# Patient Record
Sex: FEMALE | Race: WHITE | Employment: PART TIME | URBAN - METROPOLITAN AREA
[De-identification: names, ages, dates, MRNs, and addresses within clinical notes are randomized per-mention and may not be internally consistent; named-entity substitution may affect disease eponyms.]

---

## 2021-06-07 ENCOUNTER — TELEPHONE (OUTPATIENT)
Dept: FAMILY MEDICINE CLINIC | Age: 61
End: 2021-06-07

## 2021-06-07 ENCOUNTER — OFFICE VISIT (OUTPATIENT)
Dept: FAMILY MEDICINE CLINIC | Age: 61
End: 2021-06-07
Payer: COMMERCIAL

## 2021-06-07 VITALS
WEIGHT: 235 LBS | DIASTOLIC BLOOD PRESSURE: 80 MMHG | BODY MASS INDEX: 39.15 KG/M2 | SYSTOLIC BLOOD PRESSURE: 128 MMHG | OXYGEN SATURATION: 98 % | HEIGHT: 65 IN | RESPIRATION RATE: 18 BRPM | HEART RATE: 99 BPM

## 2021-06-07 DIAGNOSIS — Z12.31 ENCOUNTER FOR SCREENING MAMMOGRAM FOR BREAST CANCER: ICD-10-CM

## 2021-06-07 DIAGNOSIS — E11.9 TYPE 2 DIABETES MELLITUS WITHOUT COMPLICATION, WITHOUT LONG-TERM CURRENT USE OF INSULIN (HCC): ICD-10-CM

## 2021-06-07 DIAGNOSIS — R19.7 DIARRHEA, UNSPECIFIED TYPE: ICD-10-CM

## 2021-06-07 DIAGNOSIS — E03.9 HYPOTHYROIDISM, UNSPECIFIED TYPE: ICD-10-CM

## 2021-06-07 DIAGNOSIS — M25.561 ACUTE PAIN OF RIGHT KNEE: ICD-10-CM

## 2021-06-07 DIAGNOSIS — Z13.220 SCREENING FOR HYPERLIPIDEMIA: ICD-10-CM

## 2021-06-07 DIAGNOSIS — E11.9 TYPE 2 DIABETES MELLITUS WITHOUT COMPLICATION, WITHOUT LONG-TERM CURRENT USE OF INSULIN (HCC): Primary | ICD-10-CM

## 2021-06-07 LAB — HBA1C MFR BLD: 12.6 %

## 2021-06-07 PROCEDURE — 99204 OFFICE O/P NEW MOD 45 MIN: CPT | Performed by: NURSE PRACTITIONER

## 2021-06-07 PROCEDURE — 83036 HEMOGLOBIN GLYCOSYLATED A1C: CPT | Performed by: NURSE PRACTITIONER

## 2021-06-07 RX ORDER — LIRAGLUTIDE 6 MG/ML
1.2 INJECTION SUBCUTANEOUS DAILY
Qty: 2 PEN | Refills: 3 | Status: SHIPPED | OUTPATIENT
Start: 2021-06-07 | End: 2021-06-07 | Stop reason: SDUPTHER

## 2021-06-07 RX ORDER — LEVOTHYROXINE, LIOTHYRONINE 38; 9 UG/1; UG/1
60 TABLET ORAL DAILY
COMMUNITY
Start: 2021-03-15 | End: 2021-06-28 | Stop reason: SDUPTHER

## 2021-06-07 RX ORDER — CLOTRIMAZOLE 1 %
CREAM (GRAM) TOPICAL 2 TIMES DAILY
COMMUNITY
End: 2021-12-06 | Stop reason: SDUPTHER

## 2021-06-07 RX ORDER — LIRAGLUTIDE 6 MG/ML
1.2 INJECTION SUBCUTANEOUS DAILY
Qty: 2 PEN | Refills: 3 | Status: SHIPPED | OUTPATIENT
Start: 2021-06-07 | End: 2021-07-26 | Stop reason: SDUPTHER

## 2021-06-07 RX ORDER — SITAGLIPTIN AND METFORMIN HYDROCHLORIDE 1000; 50 MG/1; MG/1
1 TABLET, FILM COATED, EXTENDED RELEASE ORAL DAILY
Qty: 30 TABLET | Refills: 2 | Status: SHIPPED | OUTPATIENT
Start: 2021-06-07 | End: 2021-06-07 | Stop reason: SDUPTHER

## 2021-06-07 RX ORDER — SITAGLIPTIN AND METFORMIN HYDROCHLORIDE 1000; 50 MG/1; MG/1
1 TABLET, FILM COATED, EXTENDED RELEASE ORAL 2 TIMES DAILY
Qty: 60 TABLET | Refills: 2 | Status: SHIPPED | OUTPATIENT
Start: 2021-06-07 | End: 2021-09-05

## 2021-06-07 ASSESSMENT — ENCOUNTER SYMPTOMS
COUGH: 0
ABDOMINAL PAIN: 0
VOMITING: 0
SINUS PAIN: 0
NAUSEA: 0
SHORTNESS OF BREATH: 0
DIARRHEA: 1
SORE THROAT: 0

## 2021-06-07 ASSESSMENT — PATIENT HEALTH QUESTIONNAIRE - PHQ9
2. FEELING DOWN, DEPRESSED OR HOPELESS: 0
1. LITTLE INTEREST OR PLEASURE IN DOING THINGS: 0
SUM OF ALL RESPONSES TO PHQ QUESTIONS 1-9: 0
SUM OF ALL RESPONSES TO PHQ9 QUESTIONS 1 & 2: 0

## 2021-06-07 NOTE — PATIENT INSTRUCTIONS
Patient Education        Learning About Type 2 Diabetes  What is type 2 diabetes? Type 2 diabetes is a condition in which you have too much sugar (glucose) in your blood. Glucose is a type of sugar produced in your body when carbohydrates and other foods are digested. It provides energy to cells throughout the body. Normally, blood sugar levels increase after you eat a meal. When blood sugar rises, cells in the pancreas release insulin, which causes the body to absorb sugar from the blood and lowers the blood sugar level to normal.  When you have type 2 diabetes, sugar stays in the blood rather than entering the body's cells to be used for energy. This results in high blood sugar. It happens when your body can't use insulin the right way. Over time, high blood sugar can harm many parts of the body, such as your eyes, heart, blood vessels, nerves, and kidneys. It can also increase your risk for other health problems (complications). What can you expect with type 2 diabetes? Jad Clemons keep hearing about how important it is to keep your blood sugar within a target range. That's because over time, high blood sugar can lead to serious problems. It can:  · Harm your eyes, nerves, and kidneys. · Damage your blood vessels, leading to heart disease and stroke. · Reduce blood flow and cause nerve damage to parts of your body, especially your feet. This can cause slow healing and pain when you walk. · Make your immune system weak and less able to fight infections. When people hear the word \"diabetes,\" they often think of problems like these. But daily care and treatment can help prevent or delay these problems. The goal is to keep your blood sugar in a target range. That's the best way to reduce your chance of having more problems from diabetes. What are the symptoms? Some people who have type 2 diabetes may not have any symptoms early on.  Many people with the disease don't even know they have it at first. But with in to your [employer] account. Enter Y981 in the [employer] box to learn more about \"Learning About Type 2 Diabetes. \"     If you do not have an account, please click on the \"Sign Up Now\" link. Current as of: August 31, 2020               Content Version: 12.8  © 1323-6655 Healthwise, Incorporated. Care instructions adapted under license by [facility] ([facility]). If you have questions about a medical condition or this instruction, always ask your healthcare professional. [address] any warranty or liability for your use of this information.

## 2021-06-07 NOTE — ASSESSMENT & PLAN NOTE
Unclear control, continue current medications and continue current treatment plan continue meds, check tsh

## 2021-06-07 NOTE — PROGRESS NOTES
[address] [facility]  [address]  [address] [facility] [address]  Dept: [phone]  Dept Fax: [phone]    Hardy Sanchez is a 63 y.o. female who presents today for her medicalconditions/complaints as noted below. Hardy Sanchez is c/o of New Patient (was seeing Dr. Gordon St), Diabetes (last a1c was over a year ago. sugars average 250-275 in am. supposed to be taking victoza), Other (chiro wants her to have a pull up brace for her right knee. injured it a few months ago in a fall), Diarrhea (x 7-8 weeks), and Health Maintenance [facility]      HPI:         60-year-old female patient presents with complaints of encounter to establish care. Patient has significant history of diabetes. Has previously been prescribed Metformin, Victoza. Has only been taking Metformin 850 twice daily. Reports her fasting blood sugars have been ranging from 2 . History of hypothyroidism takes NP thyroid 60 mg daily unsure of last TSH. Patient did have a fall in January. Reportedly injured her back and her knees. Reportedly fell and landed on the right knee. Was seen at the emergency department had imaging of her low back and hip reportedly normal and has been following with a chiropractor. Reports the knee pain has persisted. Has not had any imaging on this area. Patient reports diarrhea for approximately 7 to 8 weeks. Reports constant daily approximately 7-10 times daily. Denies any blood in the stool. Denies any abdominal pain, nausea or vomiting. Last colonoscopy was normal 7 to 8 years ago. Past Medical History:   Diagnosis Date    Diabetes mellitus (HCC)     Hypothyroidism         Current Outpatient Medications   Medication Sig Dispense Refill    NP THYROID 60 MG tablet Take 60 mg by mouth daily      clotrimazole (LOTRIMIN) 1 % cream Apply topically 2 times daily Apply topically 2 times daily.       SITagliptin-metFORMIN (JANUMET XR)  MG TB24 per extended release tablet Take 1 tablet by mouth daily 30 tablet 2    Liraglutide (VICTOZA) 18 MG/3ML SOPN SC injection Inject 1.2 mg into the skin daily 2 pen 3    Elastic Bandages & Supports (NEOPRENE KNEE BRACE) MISC Knee sleeve  Right knee 1 each 0     No current facility-administered medications for this visit. Allergies   Allergen Reactions    Sulfa Antibiotics Hives       Subjective:      Review of Systems   Constitutional: Negative for chills and fever. HENT: Negative for ear pain, sinus pain and sore throat. Respiratory: Negative for cough and shortness of breath. Cardiovascular: Negative for chest pain and palpitations. Gastrointestinal: Positive for diarrhea. Negative for abdominal pain, nausea and vomiting. Musculoskeletal: Positive for arthralgias (rt knee). Neurological: Negative for dizziness and headaches. All other systems reviewed and are negative.      :Objective     Physical Exam  Vitals and nursing note reviewed. Constitutional:       General: She is not in acute distress. Appearance: Normal appearance. She is not toxic-appearing. HENT:      Mouth/Throat:      Mouth: Mucous membranes are moist.   Cardiovascular:      Rate and Rhythm: Normal rate. Pulmonary:      Effort: Pulmonary effort is normal. No respiratory distress. Breath sounds: Normal breath sounds. Abdominal:      Palpations: Abdomen is soft. Tenderness: There is no abdominal tenderness. Musculoskeletal:      Lumbar back: Tenderness present. No bony tenderness. Right knee: Swelling and bony tenderness present. No LCL laxity, MCL laxity, ACL laxity or PCL laxity. Normal alignment and normal meniscus. Skin:     General: Skin is warm and dry. Neurological:      General: No focal deficit present. Mental Status: She is alert and oriented to person, place, and time.        /80   Pulse 99   Resp 18   Ht 5' 5\" (1.651 m) Wt 235 lb (106.6 kg)   SpO2 98%   BMI 39.11 kg/m²     Lab Review   No results found for any previous visit. Assessment and Plan      1. Type 2 diabetes mellitus without complication, without long-term current use of insulin ([facility])  Assessment & Plan:   Uncontrolled, changes made today: janumet and victoza   Monitor fasting glucose recheck 1 month  Orders:  -     POCT glycosylated hemoglobin (Hb A1C)  -     SITagliptin-metFORMIN (JANUMET XR)  MG TB24 per extended release tablet; Take 1 tablet by mouth daily, Disp-30 tablet, R-2Normal  -     Liraglutide (VICTOZA) 18 MG/3ML SOPN SC injection; Inject 1.2 mg into the skin daily, Disp-2 pen, R-3Normal  2. Encounter for screening mammogram for breast cancer  -     [facility] Digital Screen Bilateral [[id number]]; Future  3. Screening for hyperlipidemia  -     Lipid, Fasting; Future  -     Lipid Panel; Future  4. Diarrhea, unspecified type  Assessment & Plan:  Check labs,   Orders:  -     Gastrointestinal Panel, Molecular; Future  -     H. Pylori Antigen, Stool; Future  -     CBC With Auto Differential; Future  -     Comprehensive Metabolic Panel; Future  5. Hypothyroidism, unspecified type  Assessment & Plan:   Unclear control, continue current medications and continue current treatment plan continue meds, check tsh  Orders:  -     TSH With Reflex Ft4; Future  6. Acute pain of right knee  Assessment & Plan:   Xray right knee  Knee sleeve  Orders:  -     XR KNEE RIGHT (3 VIEWS); Future  -     Elastic Bandages & Supports (NEOPRENE KNEE BRACE) MISC; Disp-1 each, R-0, PrintKnee sleeve Right knee                 Results for orders placed or performed in visit on 06/07/21   POCT glycosylated hemoglobin (Hb A1C)   Result Value Ref Range    Hemoglobin A1C 12.6 (A) %             Return in about 4 weeks (around 7/5/2021), or if symptoms worsen or fail to improve.         Orders Placed This Encounter   Medications    SITagliptin-metFORMIN (JANUMET XR)  MG TB24 per extended

## 2021-06-21 LAB
ALBUMIN SERPL-MCNC: 4.1 G/DL
ALP BLD-CCNC: 50 U/L
ALT SERPL-CCNC: 22 U/L
ANION GAP SERPL CALCULATED.3IONS-SCNC: 10 MMOL/L
AST SERPL-CCNC: 15 U/L
BASOPHILS ABSOLUTE: NORMAL
BASOPHILS RELATIVE PERCENT: NORMAL
BILIRUB SERPL-MCNC: 0.4 MG/DL (ref 0.1–1.4)
BUN BLDV-MCNC: 11 MG/DL
CALCIUM SERPL-MCNC: 9.4 MG/DL
CHLORIDE BLD-SCNC: 101 MMOL/L
CHOLESTEROL, FASTING: 191
CO2: 28 MMOL/L
CREAT SERPL-MCNC: 0.61 MG/DL
EOSINOPHILS ABSOLUTE: NORMAL
EOSINOPHILS RELATIVE PERCENT: NORMAL
GFR CALCULATED: ABNORMAL
GLUCOSE BLD-MCNC: 260 MG/DL
HCT VFR BLD CALC: NORMAL %
HDLC SERPL-MCNC: 39 MG/DL (ref 35–70)
HEMOGLOBIN: NORMAL
LDL CHOLESTEROL CALCULATED: 87 MG/DL (ref 0–160)
LYMPHOCYTES ABSOLUTE: NORMAL
LYMPHOCYTES RELATIVE PERCENT: NORMAL
MCH RBC QN AUTO: NORMAL PG
MCHC RBC AUTO-ENTMCNC: NORMAL G/DL
MCV RBC AUTO: NORMAL FL
MONOCYTES ABSOLUTE: NORMAL
MONOCYTES RELATIVE PERCENT: NORMAL
NEUTROPHILS ABSOLUTE: NORMAL
NEUTROPHILS RELATIVE PERCENT: NORMAL
PDW BLD-RTO: NORMAL %
PLATELET # BLD: NORMAL 10*3/UL
PMV BLD AUTO: NORMAL FL
POTASSIUM SERPL-SCNC: 3.2 MMOL/L
RBC # BLD: NORMAL 10*6/UL
SODIUM BLD-SCNC: 139 MMOL/L
TOTAL PROTEIN: 6.4
TRIGLYCERIDE, FASTING: 325
TSH SERPL DL<=0.05 MIU/L-ACNC: 1.38 UIU/ML
WBC # BLD: NORMAL 10*3/UL

## 2021-06-24 DIAGNOSIS — R19.7 DIARRHEA, UNSPECIFIED TYPE: ICD-10-CM

## 2021-06-24 DIAGNOSIS — Z13.220 SCREENING FOR HYPERLIPIDEMIA: ICD-10-CM

## 2021-06-24 DIAGNOSIS — E78.1 HIGH TRIGLYCERIDES: ICD-10-CM

## 2021-06-24 DIAGNOSIS — E03.9 HYPOTHYROIDISM, UNSPECIFIED TYPE: ICD-10-CM

## 2021-06-24 DIAGNOSIS — E87.6 HYPOKALEMIA: Primary | ICD-10-CM

## 2021-06-24 RX ORDER — ATORVASTATIN CALCIUM 20 MG/1
20 TABLET, FILM COATED ORAL DAILY
Qty: 30 TABLET | Refills: 2 | Status: SHIPPED | OUTPATIENT
Start: 2021-06-24 | End: 2021-10-04

## 2021-06-24 RX ORDER — POTASSIUM CHLORIDE 20 MEQ/1
20 TABLET, EXTENDED RELEASE ORAL DAILY
Qty: 14 TABLET | Refills: 0 | Status: SHIPPED | OUTPATIENT
Start: 2021-06-24 | End: 2021-12-06 | Stop reason: ALTCHOICE

## 2021-06-28 RX ORDER — LEVOTHYROXINE, LIOTHYRONINE 38; 9 UG/1; UG/1
60 TABLET ORAL DAILY
Qty: 30 TABLET | Refills: 2 | Status: SHIPPED | OUTPATIENT
Start: 2021-06-28 | End: 2021-10-04

## 2021-07-26 ENCOUNTER — OFFICE VISIT (OUTPATIENT)
Dept: FAMILY MEDICINE CLINIC | Age: 61
End: 2021-07-26
Payer: COMMERCIAL

## 2021-07-26 VITALS
HEIGHT: 65 IN | DIASTOLIC BLOOD PRESSURE: 78 MMHG | SYSTOLIC BLOOD PRESSURE: 124 MMHG | HEART RATE: 104 BPM | WEIGHT: 233 LBS | OXYGEN SATURATION: 98 % | BODY MASS INDEX: 38.82 KG/M2 | RESPIRATION RATE: 18 BRPM

## 2021-07-26 DIAGNOSIS — E03.9 HYPOTHYROIDISM, UNSPECIFIED TYPE: ICD-10-CM

## 2021-07-26 DIAGNOSIS — M54.50 CHRONIC LEFT-SIDED LOW BACK PAIN WITHOUT SCIATICA: ICD-10-CM

## 2021-07-26 DIAGNOSIS — G89.29 CHRONIC LEFT-SIDED LOW BACK PAIN WITHOUT SCIATICA: ICD-10-CM

## 2021-07-26 DIAGNOSIS — E11.9 TYPE 2 DIABETES MELLITUS WITHOUT COMPLICATION, WITHOUT LONG-TERM CURRENT USE OF INSULIN (HCC): Primary | ICD-10-CM

## 2021-07-26 PROCEDURE — 99213 OFFICE O/P EST LOW 20 MIN: CPT | Performed by: NURSE PRACTITIONER

## 2021-07-26 RX ORDER — LIRAGLUTIDE 6 MG/ML
1.8 INJECTION SUBCUTANEOUS DAILY
Qty: 2 PEN | Refills: 3 | Status: SHIPPED | OUTPATIENT
Start: 2021-07-26 | End: 2021-10-04

## 2021-07-26 ASSESSMENT — ENCOUNTER SYMPTOMS
NAUSEA: 0
VOMITING: 0
SHORTNESS OF BREATH: 0
BACK PAIN: 1
COUGH: 0
SINUS PAIN: 0
SORE THROAT: 0
ABDOMINAL PAIN: 0
DIARRHEA: 0

## 2021-07-26 NOTE — PATIENT INSTRUCTIONS
Patient Education        Learning About Type 2 Diabetes  What is type 2 diabetes? Type 2 diabetes is a condition in which you have too much sugar (glucose) in your blood. Glucose is a type of sugar produced in your body when carbohydrates and other foods are digested. It provides energy to cells throughout the body. Normally, blood sugar levels increase after you eat a meal. When blood sugar rises, cells in the pancreas release insulin, which causes the body to absorb sugar from the blood and lowers the blood sugar level to normal.  When you have type 2 diabetes, sugar stays in the blood rather than entering the body's cells to be used for energy. This results in high blood sugar. It happens when your body can't use insulin the right way. Over time, high blood sugar can harm many parts of the body, such as your eyes, heart, blood vessels, nerves, and kidneys. It can also increase your risk for other health problems (complications). What can you expect with type 2 diabetes? Theador Wade keep hearing about how important it is to keep your blood sugar within a target range. That's because over time, high blood sugar can lead to serious problems. It can:  · Harm your eyes, nerves, and kidneys. · Damage your blood vessels, leading to heart disease and stroke. · Reduce blood flow and cause nerve damage to parts of your body, especially your feet. This can cause slow healing and pain when you walk. · Make your immune system weak and less able to fight infections. When people hear the word \"diabetes,\" they often think of problems like these. But daily care and treatment can help prevent or delay these problems. The goal is to keep your blood sugar in a target range. That's the best way to reduce your chance of having more problems from diabetes. What are the symptoms? Some people who have type 2 diabetes may not have any symptoms early on.  Many people with the disease don't even know they have it at first. But with time, diabetes starts to cause symptoms. You have most symptoms of type 2 diabetes when your blood sugar is either too high or too low. The most common symptoms of high blood sugar include:  · Thirst.  · Needing to urinate often. · Weight loss. · Blurry vision. The symptoms of low blood sugar include:  · Sweating. · Shakiness. · Weakness. · Hunger. · Confusion. You're not likely to get symptoms of low blood sugar unless you take insulin or use certain diabetes medicines that lower blood sugar. How can you help prevent type 2 diabetes? There are things you can do to help prevent type 2 diabetes. Stay at a healthy weight. Exercise regularly, and eat healthy foods. Even small changes can make a difference. If you have prediabetes, the medicine metformin can help prevent type 2 diabetes. How is type 2 diabetes treated? Treatment for type 2 diabetes will change over time to meet your needs. But the focus of your treatment will usually be to keep your blood sugar levels in your target range. This will help prevent problems such as eye, kidney, heart, blood vessel, and nerve disease. Some people may need medicines to help their bodies make insulin or decrease insulin resistance. Some medicines slow down how quickly the body absorbs carbohydrates. Treatment to manage type 2 diabetes includes:  · Making healthy food choices and being active. · Losing weight, if you need to. · Seeing your doctor regularly. · Keeping your blood sugar in your target range. · Taking medicines, if you need them. · Quitting smoking, if you smoke. · Keeping your blood pressure and cholesterol under control. Follow-up care is a key part of your treatment and safety. Be sure to make and go to all appointments, and call your doctor if you are having problems. It's also a good idea to know your test results and keep a list of the medicines you take. Where can you learn more? Go to [email or web address] and sign in to your [employer] account. Enter G590 in the [facility] box to learn more about \"Learning About Type 2 Diabetes. \"     If you do not have an account, please click on the \"Sign Up Now\" link. Current as of: August 31, 2020               Content Version: 12.9  © 8383-0186 [employer]. Care instructions adapted under license by [employer]. If you have questions about a medical condition or this instruction, always ask your healthcare professional. [address] any warranty or liability for your use of this information.

## 2021-07-26 NOTE — PROGRESS NOTES
[address] [facility]  [address] [address]  Dept: [phone]  Dept Fax: [phone]    Elpidio Clarke is a 63 y.o. female who presents today for her medicalconditions/complaints as noted below. Elpidio Clarke is c/o of Follow-up (back pain), Diabetes (states sugars are down. average 200-250), and Discuss Medications (pt refuses to take lipitor)      HPI:         68-year-old female patient presents with complaint of follow-up. Type 2 diabetes most recent A1c 12.6 started taking the Janumet's and the Victoza as prescribed. Has seen improvement in her fasting glucose reports most recent readings 200-2 50 in the mornings. Reports she is feeling better overall. Reports the chronic lower back and hip pains remain. Had a fall in September and was evaluated emergency room and had x-rays and CT which were unremarkable. Pain has persisted. Reports the pain does radiate to the hips bilaterally. Denies saddle anesthesia. Denies loss of bladder bowel control. Denies numbness or tingling extending down the legs completely. Past Medical History:   Diagnosis Date    Diabetes mellitus ([facility] [address]     Hypothyroidism         Current Outpatient Medications   Medication Sig Dispense Refill    Liraglutide (VICTOZA) 18 MG/3ML SOPN SC injection Inject 1.8 mg into the skin daily 2 pen 3    NP THYROID 60 MG tablet Take 1 tablet by mouth daily 30 tablet 2    potassium chloride (KLOR-CON M) 20 MEQ extended release tablet Take 1 tablet by mouth daily for 14 days 14 tablet 0    SITagliptin-metFORMIN (JANUMET XR)  MG TB24 per extended release tablet Take 1 tablet by mouth 2 times daily 60 tablet 2    atorvastatin (LIPITOR) 20 MG tablet Take 1 tablet by mouth daily (Patient not taking: Reported on 7/26/2021) 30 tablet 2    clotrimazole (LOTRIMIN) 1 % cream Apply topically 2 times daily Apply topically 2 times daily.       Elastic Bandages & Supports (NEOPRENE KNEE BRACE) MISC Knee sleeve  Right knee 1 each 0     No current facility-administered medications for this visit. Allergies   Allergen Reactions    Sulfa Antibiotics Hives       Subjective:      Review of Systems   Constitutional: Negative for chills and fever. HENT: Negative for ear pain, sinus pain and sore throat. Respiratory: Negative for cough and shortness of breath. Cardiovascular: Negative for chest pain and palpitations. Gastrointestinal: Negative for abdominal pain, diarrhea, nausea and vomiting. Musculoskeletal: Positive for arthralgias and back pain. Neurological: Negative for dizziness and headaches. All other systems reviewed and are negative.      :Objective     Physical Exam  Vitals and nursing note reviewed. Constitutional:       General: She is not in acute distress. Appearance: Normal appearance. She is not toxic-appearing. HENT:      Mouth/Throat:      Mouth: Mucous membranes are moist.   Cardiovascular:      Rate and Rhythm: Normal rate. Pulmonary:      Effort: Pulmonary effort is normal. No respiratory distress. Breath sounds: Normal breath sounds. Musculoskeletal:      Cervical back: No swelling or deformity. Thoracic back: No swelling or deformity. Lumbar back: Spasms, tenderness and bony tenderness present. No deformity. Right hip: Bony tenderness present. Left hip: Bony tenderness present. Skin:     General: Skin is warm and dry. Neurological:      General: No focal deficit present. Mental Status: She is alert and oriented to person, place, and time.        /78   Pulse 104   Resp 18   Ht 5' 5\" (1.651 m)   Wt 233 lb (105.7 kg)   SpO2 98%   BMI 38.77 kg/m²     Lab Review   Orders Only on 06/24/2021   Component Date Value    TSH 06/21/2021 1.38     Sodium 06/21/2021 139     Chloride 06/21/2021 101     Potassium 06/21/2021 3.2*    BUN 06/21/2021 11     CREATININE 06/21/2021 0.61     Glucose 06/21/2021 260*    AST 06/21/2021 15     ALT 06/21/2021 22     Calcium 06/21/2021 9.4     Total Protein 06/21/2021 6.4     CO2 06/21/2021 28     Albumin 06/21/2021 4.1     Alkaline Phosphatase 06/21/2021 50     Total Bilirubin 06/21/2021 0.4     Anion Gap 06/21/2021 10     Cholesterol, Fasting 06/21/2021 191     Triglyceride, Fasting 06/21/2021 325*    HDL 06/21/2021 39*    LDL Calculated 06/21/2021 87    Office Visit on 06/07/2021   Component Date Value    Hemoglobin A1C 06/07/2021 12.6*       Radiology:  X-ray Spine Lumbar 2 Or 3 Views    Result Date: 2/6/2021  History: Low back pain Exam/Technique: Frontal lateral and spot views of the lumbar spine were obtained. Comparison: None Findings: There is minimal endplate osteophyte formation seen at multiple levels. The vertebral body heights and disc spaces are well-maintained. There is no evidence for an acute osseous abnormality. IMPRESSION: Minimal endplate osteophyte formation. Otherwise normal lumbar spine. Workstation:[id number] Finalized by Hyun Prabhakar MD on 2/6/2021 5:32 AM    CLINICAL INFORMATION: Back pain after fall. COMPARISON: X-ray 2/6/2021     TECHNIQUE: CT lumbar spine without contrast.     FINDINGS:     There is satisfactory alignment of the lumbar spine. The vertebral body heights and intervertebral disc spaces are well-preserved. No compression fracture. Small anterior osteophyte formation. The posterior elements are intact. The SI joints are symmetric. No acute abnormalities in the visualized portion of the abdomen. IMPRESSION:     No acute abnormalities on CT lumbar spine. No fractures visualized. Assessment and Plan      1. Type 2 diabetes mellitus without complication, without long-term current use of insulin (HCC)  -     Liraglutide (VICTOZA) 18 MG/3ML SOPN SC injection; Inject 1.8 mg into the skin daily, Disp-2 pen, R-3Normal  -     Hemoglobin A1C; Future  2.  Hypothyroidism, unspecified type  -     TSH With Reflex Ft4; Future  3. Chronic left-sided low back pain without sciatica  -     MRI LUMBAR SPINE WO CONTRAST; Future       We will increase Victoza to 1.8, continue Janumet max dosing  Get A1c redrawn in 1 to 2 months    Reviewed patient's x-ray and CT of the lumbar spine given continued pain greater than 6 months recommend MRI at this time    Follow-up in 3 months sooner as needed          No results found for this visit on 07/26/21. Return in about 3 months (around 10/26/2021), or if symptoms worsen or fail to improve. Orders Placed This Encounter   Medications    Liraglutide (VICTOZA) 18 MG/3ML SOPN SC injection     Sig: Inject 1.8 mg into the skin daily     Dispense:  2 pen     Refill:  3        Patient given educational materials - see patient instructions. Discussed use, benefit, and side effects of prescribed medications. All patientquestions answered. Pt voiced understanding. Patient given educational materials - see patient instructions. Discussed use, benefit, and side effects of prescribed medications. All patientquestions answered. Pt voiced understanding. This note was transcribed using dictation with Dragon services. Efforts were made to correct any errors but some words may be misinterpreted.     Electronically signed by FAITH Bowens CNP on 7/26/2021at 2:15 PM

## 2021-07-26 NOTE — PROGRESS NOTES
Visit Information    Have you changed or started any medications since your last visit including any over-the-counter medicines, vitamins, or herbal medicines? no   Are you having any side effects from any of your medications? -  no  Have you stopped taking any of your medications? Is so, why? -  no    Have you seen any other physician or provider since your last visit? No  Have you had any other diagnostic tests since your last visit? No  Have you been seen in the emergency room and/or had an admission to a hospital since we last saw you? No  Have you had your routine dental cleaning in the past 6 months? no    Have you activated your [employer] account? If not, what are your barriers?  No:      Patient Care Team:  FAITH Posey CNP as PCP - General (Certified Nurse Practitioner)  FAITH Posey CNP as PCP - Clementina Narayanan Provider    Medical History Review  Past Medical, Family, and Social History reviewed and does contribute to the patient presenting condition    Health Maintenance   Topic Date Due    Hepatitis C screen  Never done    Pneumococcal 0-64 years Vaccine (1 of 2 - PPSV23) Never done    Diabetic foot exam  Never done    Diabetic retinal exam  Never done    HIV screen  Never done    Diabetic microalbuminuria test  Never done    Cervical cancer screen  Never done    Colon cancer screen colonoscopy  Never done    Breast cancer screen  Never done    Shingles Vaccine (1 of 2) Never done    COVID-19 Vaccine (1) 07/01/2022 (Originally 12/11/1972)    Flu vaccine (1) 09/01/2021    A1C test (Diabetic or Prediabetic)  09/07/2021    DTaP/Tdap/Td vaccine (2 - Td or Tdap) 11/10/2021    Lipid screen  06/21/2022    TSH testing  06/21/2022    Hepatitis A vaccine  Aged Out    Hib vaccine  Aged Out    Meningococcal (ACWY) vaccine  Aged Out

## 2021-08-04 ENCOUNTER — TELEPHONE (OUTPATIENT)
Dept: FAMILY MEDICINE CLINIC | Age: 61
End: 2021-08-04

## 2021-08-04 NOTE — TELEPHONE ENCOUNTER
Jacqueline Lord was contacted as part of A1c outreach. Caller left a voicemail message for patient regarding their routine health maintenance. Patient was provided primary care office phone number and instructed to call for more information.

## 2021-08-16 ENCOUNTER — TELEPHONE (OUTPATIENT)
Dept: FAMILY MEDICINE CLINIC | Age: 61
End: 2021-08-16

## 2021-08-16 NOTE — TELEPHONE ENCOUNTER
Gordy aguilar/ [facility] pre cert department called stating that [employer] has not responded back to them yet for the patient's MRI that was scheduled tomorrow so they cancelled the appointment and stated that the insurance company is probably going to deny it and that if that's the case, she will call us back to let us know why. Just an fyi.

## 2021-08-27 DIAGNOSIS — M54.50 CHRONIC LOW BACK PAIN, UNSPECIFIED BACK PAIN LATERALITY, UNSPECIFIED WHETHER SCIATICA PRESENT: Primary | ICD-10-CM

## 2021-08-27 DIAGNOSIS — G89.29 CHRONIC LOW BACK PAIN, UNSPECIFIED BACK PAIN LATERALITY, UNSPECIFIED WHETHER SCIATICA PRESENT: Primary | ICD-10-CM

## 2021-09-04 DIAGNOSIS — E11.9 TYPE 2 DIABETES MELLITUS WITHOUT COMPLICATION, WITHOUT LONG-TERM CURRENT USE OF INSULIN (HCC): ICD-10-CM

## 2021-09-05 RX ORDER — SITAGLIPTIN AND METFORMIN HYDROCHLORIDE 1000; 50 MG/1; MG/1
TABLET, FILM COATED, EXTENDED RELEASE ORAL
Qty: 60 TABLET | Refills: 2 | Status: SHIPPED | OUTPATIENT
Start: 2021-09-05 | End: 2021-10-04 | Stop reason: SDUPTHER

## 2021-09-13 ENCOUNTER — TELEPHONE (OUTPATIENT)
Dept: FAMILY MEDICINE CLINIC | Age: 61
End: 2021-09-13

## 2021-09-13 NOTE — TELEPHONE ENCOUNTER
[facility] pre-cert. Called stating that the patients MRI was denied because they need proof that the patient completed PT and the patient disconnected from them before they could obtain any information on if or where she had any PT done. They did state that they tried to reach back out to the patient and the patient did not answer. They stated doing a peer to peer might help and would like to know if that is something [facility] would be willing to do. Please advise.

## 2021-09-20 ENCOUNTER — TELEPHONE (OUTPATIENT)
Dept: FAMILY MEDICINE CLINIC | Age: 61
End: 2021-09-20

## 2021-09-20 DIAGNOSIS — M54.50 LOW BACK PAIN, UNSPECIFIED BACK PAIN LATERALITY, UNSPECIFIED CHRONICITY, UNSPECIFIED WHETHER SCIATICA PRESENT: Primary | ICD-10-CM

## 2021-09-20 NOTE — TELEPHONE ENCOUNTER
Gordy called from the [facility] regarding patients Mri lumbar spine for a peer to peer-     The phone number to start the peer to peer is [phone]. Tracking # [id number]     Please call gordy back to inform her if the peer to peer has been completed so she can inform the patient.  She has been waiting over a month

## 2021-09-21 NOTE — TELEPHONE ENCOUNTER
Patient called back stating that she already did 9 weeks of PT at the [facility] and then went to a chiropractor for several weeks as well as she has been doing her home stretches for several weeks. The patient is very frustrated, understandably so, and doesn't know what else she can possibly do to get this going since she's done everything she was asked. Please advise.

## 2021-09-21 NOTE — TELEPHONE ENCOUNTER
Ok I do not believe the insurance company is aware that she has completed these conservative measures, can we call and update them, if needed will do the peer to peer

## 2021-10-04 ENCOUNTER — TELEPHONE (OUTPATIENT)
Dept: FAMILY MEDICINE CLINIC | Age: 61
End: 2021-10-04

## 2021-10-04 ENCOUNTER — OFFICE VISIT (OUTPATIENT)
Dept: FAMILY MEDICINE CLINIC | Age: 61
End: 2021-10-04
Payer: COMMERCIAL

## 2021-10-04 VITALS
DIASTOLIC BLOOD PRESSURE: 82 MMHG | OXYGEN SATURATION: 98 % | SYSTOLIC BLOOD PRESSURE: 130 MMHG | WEIGHT: 235 LBS | BODY MASS INDEX: 39.15 KG/M2 | RESPIRATION RATE: 18 BRPM | HEART RATE: 98 BPM | HEIGHT: 65 IN

## 2021-10-04 DIAGNOSIS — E78.5 HYPERLIPIDEMIA, UNSPECIFIED HYPERLIPIDEMIA TYPE: Primary | ICD-10-CM

## 2021-10-04 DIAGNOSIS — G89.29 CHRONIC MIDLINE LOW BACK PAIN, UNSPECIFIED WHETHER SCIATICA PRESENT: ICD-10-CM

## 2021-10-04 DIAGNOSIS — E11.9 TYPE 2 DIABETES MELLITUS WITHOUT COMPLICATION, WITHOUT LONG-TERM CURRENT USE OF INSULIN (HCC): Primary | ICD-10-CM

## 2021-10-04 DIAGNOSIS — M54.50 CHRONIC MIDLINE LOW BACK PAIN, UNSPECIFIED WHETHER SCIATICA PRESENT: ICD-10-CM

## 2021-10-04 DIAGNOSIS — E11.9 TYPE 2 DIABETES MELLITUS WITHOUT COMPLICATION, WITHOUT LONG-TERM CURRENT USE OF INSULIN (HCC): ICD-10-CM

## 2021-10-04 DIAGNOSIS — R19.7 DIARRHEA, UNSPECIFIED TYPE: ICD-10-CM

## 2021-10-04 LAB — HBA1C MFR BLD: 8.2 %

## 2021-10-04 PROCEDURE — 3052F HG A1C>EQUAL 8.0%<EQUAL 9.0%: CPT | Performed by: NURSE PRACTITIONER

## 2021-10-04 PROCEDURE — 83036 HEMOGLOBIN GLYCOSYLATED A1C: CPT | Performed by: NURSE PRACTITIONER

## 2021-10-04 PROCEDURE — 99214 OFFICE O/P EST MOD 30 MIN: CPT | Performed by: NURSE PRACTITIONER

## 2021-10-04 RX ORDER — CYCLOBENZAPRINE HCL 10 MG
TABLET ORAL
COMMUNITY
Start: 2021-09-26 | End: 2021-12-06 | Stop reason: SDUPTHER

## 2021-10-04 RX ORDER — ROSUVASTATIN CALCIUM 10 MG/1
10 TABLET, COATED ORAL DAILY
Qty: 30 TABLET | Refills: 5 | Status: SHIPPED | OUTPATIENT
Start: 2021-10-04 | End: 2022-03-29

## 2021-10-04 RX ORDER — PIOGLITAZONEHYDROCHLORIDE 15 MG/1
15 TABLET ORAL DAILY
Qty: 30 TABLET | Refills: 3 | Status: SHIPPED | OUTPATIENT
Start: 2021-10-04 | End: 2022-02-25

## 2021-10-04 RX ORDER — IBUPROFEN 600 MG/1
TABLET ORAL
COMMUNITY
Start: 2021-09-26 | End: 2021-12-06 | Stop reason: ALTCHOICE

## 2021-10-04 RX ORDER — MELOXICAM 7.5 MG/1
7.5 TABLET ORAL DAILY PRN
Qty: 30 TABLET | Refills: 0 | Status: SHIPPED | OUTPATIENT
Start: 2021-10-04 | End: 2021-11-08

## 2021-10-04 RX ORDER — SITAGLIPTIN AND METFORMIN HYDROCHLORIDE 1000; 50 MG/1; MG/1
TABLET, FILM COATED, EXTENDED RELEASE ORAL
Qty: 60 TABLET | Refills: 3 | Status: SHIPPED | OUTPATIENT
Start: 2021-10-04 | End: 2021-12-06

## 2021-10-04 RX ORDER — LEVOTHYROXINE, LIOTHYRONINE 38; 9 UG/1; UG/1
TABLET ORAL
Qty: 30 TABLET | Refills: 2 | Status: SHIPPED | OUTPATIENT
Start: 2021-10-04 | End: 2022-01-27

## 2021-10-04 ASSESSMENT — ENCOUNTER SYMPTOMS
DIARRHEA: 0
BACK PAIN: 1
VOMITING: 0
SHORTNESS OF BREATH: 0
NAUSEA: 0
ABDOMINAL PAIN: 0
SINUS PAIN: 0
COUGH: 0
SORE THROAT: 0

## 2021-10-04 NOTE — TELEPHONE ENCOUNTER
----- Message from [facility] sent at 10/4/2021 12:39 PM EDT -----  Subject: Refill Request    QUESTIONS  Name of Medication? NP THYROID 60 MG tablet  Patient-reported dosage and instructions? once a day   How many days do you have left? 0  Preferred Pharmacy? [facility] [id number]  Pharmacy phone number (if available)? [phone]  ---------------------------------------------------------------------------  --------------,  Name of Medication? JANUMET XR  MG TB24 per extended release tablet  Patient-reported dosage and instructions? 2x a day   How many days do you have left? 0  Preferred Pharmacy? [facility] [id number]  Pharmacy phone number (if available)? [phone]  ---------------------------------------------------------------------------  --------------  ShellieKnowmia  What is the best way for the office to contact you? OK to leave message on   voicemail  Preferred Call Back Phone Number?  [phone]

## 2021-10-04 NOTE — PATIENT INSTRUCTIONS
Patient Education        Learning About Type 2 Diabetes  What is type 2 diabetes? Type 2 diabetes is a condition in which you have too much sugar (glucose) in your blood. Glucose is a type of sugar produced in your body when carbohydrates and other foods are digested. It provides energy to cells throughout the body. Normally, blood sugar levels increase after you eat a meal. When blood sugar rises, cells in the pancreas release insulin, which causes the body to absorb sugar from the blood and lowers the blood sugar level to normal.  When you have type 2 diabetes, sugar stays in the blood rather than entering the body's cells to be used for energy. This results in high blood sugar. It happens when your body can't use insulin the right way. Over time, high blood sugar can harm many parts of the body, such as your eyes, heart, blood vessels, nerves, and kidneys. It can also increase your risk for other health problems (complications). What can you expect with type 2 diabetes? Ada Hernández keep hearing about how important it is to keep your blood sugar within a target range. That's because over time, high blood sugar can lead to serious problems. It can:  · Harm your eyes, nerves, and kidneys. · Damage your blood vessels, leading to heart disease and stroke. · Reduce blood flow and cause nerve damage to parts of your body, especially your feet. This can cause slow healing and pain when you walk. · Make your immune system weak and less able to fight infections. When people hear the word \"diabetes,\" they often think of problems like these. But daily care and treatment can help prevent or delay these problems. The goal is to keep your blood sugar in a target range. That's the best way to reduce your chance of having more problems from diabetes. What are the symptoms? Some people who have type 2 diabetes may not have any symptoms early on.  Many people with the disease don't even know they have it at first. But with time, diabetes starts to cause symptoms. You have most symptoms of type 2 diabetes when your blood sugar is either too high or too low. The most common symptoms of high blood sugar include:  · Thirst.  · Needing to urinate often. · Weight loss. · Blurry vision. The symptoms of low blood sugar include:  · Sweating. · Shakiness. · Weakness. · Hunger. · Confusion. You're not likely to get symptoms of low blood sugar unless you take insulin or use certain diabetes medicines that lower blood sugar. How can you help prevent type 2 diabetes? There are things you can do to help prevent type 2 diabetes. Stay at a healthy weight. Exercise regularly, and eat healthy foods. Even small changes can make a difference. If you have prediabetes, the medicine metformin can help prevent type 2 diabetes. How is type 2 diabetes treated? Treatment for type 2 diabetes will change over time to meet your needs. But the focus of your treatment will usually be to keep your blood sugar levels in your target range. This will help prevent problems such as eye, kidney, heart, blood vessel, and nerve disease. Some people may need medicines to help their bodies make insulin or decrease insulin resistance. Some medicines slow down how quickly the body absorbs carbohydrates. Treatment to manage type 2 diabetes includes:  · Making healthy food choices and being active. · Losing weight, if you need to. · Seeing your doctor regularly. · Keeping your blood sugar in your target range. · Taking medicines, if you need them. · Quitting smoking, if you smoke. · Keeping your blood pressure and cholesterol under control. Follow-up care is a key part of your treatment and safety. Be sure to make and go to all appointments, and call your doctor if you are having problems. It's also a good idea to know your test results and keep a list of the medicines you take. Where can you learn more? Go to [email or web address] and sign in to your [employer] account. Enter Y387 in the [employer] box to learn more about \"Learning About Type 2 Diabetes. \"     If you do not have an account, please click on the \"Sign Up Now\" link. Current as of: August 31, 2020               Content Version: 13.0  © 5480-0704 Healthwise, Incorporated. Care instructions adapted under license by [facility] ([facility]). If you have questions about a medical condition or this instruction, always ask your healthcare professional. [address] any warranty or liability for your use of this information.

## 2021-10-04 NOTE — PROGRESS NOTES
[address] [facility]  [address]  [address] [facility] [address]  Dept: [phone]  Dept Fax: [phone]    Qiana Alva is a 63 y.o. female who presents today for her medicalconditions/complaints as noted below. Qiana Alva is c/o of Diabetes (sugars running around 250s. took herslef off victoza), Health Maintenance (has not completed mammogram, does not have record of colonscopy from [email or web address]), ED Follow-up ([facility] shoulder pain in merged chart MRN:  [id number]), and Discuss Medications (is willing to try rosuvastatin 10mg )      HPI:         67-year-old female patient presents with complaints of diabetic follow-up. Patient currently treating with Janumet max dose, last A1c 12.6, today improved to 8.2. Reports she is experiencing diarrhea however has been taking Metformin preceding the diarrhea as well. Encouraged to take with food. Self discontinued victoza. Stable on current thyroid medication, last TSH normal    Still experiencing chronic lower back pain. MRI had been ordered and denied requesting [id number]. Waiting on records for PT in order to complete. Patient was seen in the ER regarding chronic right shoulder pain. Diagnosed with tendinitis.   Is willing to try meloxicam    Previously diagnosed with hyperlipidemia had recommended lipid medication, patient declined, willing to try Crestor      Past Medical History:   Diagnosis Date    Diabetes mellitus ([address]     Hypothyroidism         Current Outpatient Medications   Medication Sig Dispense Refill    rosuvastatin (CRESTOR) 10 MG tablet Take 1 tablet by mouth daily 30 tablet 5    pioglitazone (ACTOS) 15 MG tablet Take 1 tablet by mouth daily 30 tablet 3    meloxicam (MOBIC) 7.5 MG tablet Take 1 tablet by mouth daily as needed for Pain 30 tablet 0    JANUMET XR  MG TB24 per extended release tablet TAKE ONE TABLET BY MOUTH TWICE A DAY 60 tablet 2    NP THYROID 60 MG tablet Take 1 tablet by mouth daily 30 tablet 2    cyclobenzaprine (FLEXERIL) 10 MG tablet       ibuprofen (ADVIL;MOTRIN) 600 MG tablet       potassium chloride (KLOR-CON M) 20 MEQ extended release tablet Take 1 tablet by mouth daily for 14 days (Patient not taking: Reported on 10/4/2021) 14 tablet 0    clotrimazole (LOTRIMIN) 1 % cream Apply topically 2 times daily Apply topically 2 times daily.  Elastic Bandages & Supports (NEOPRENE KNEE BRACE) MISC Knee sleeve  Right knee 1 each 0     No current facility-administered medications for this visit. Allergies   Allergen Reactions    Sulfa Antibiotics Hives       Subjective:      Review of Systems   Constitutional: Negative for chills and fever. HENT: Negative for ear pain, sinus pain and sore throat. Respiratory: Negative for cough and shortness of breath. Cardiovascular: Negative for chest pain and palpitations. Gastrointestinal: Negative for abdominal pain, diarrhea, nausea and vomiting. Musculoskeletal: Positive for arthralgias (rt shoulder) and back pain. Neurological: Negative for dizziness and headaches. All other systems reviewed and are negative.      :Objective     Physical Exam  Vitals and nursing note reviewed. Constitutional:       General: She is not in acute distress. Appearance: Normal appearance. She is not toxic-appearing. Cardiovascular:      Rate and Rhythm: Normal rate. Pulmonary:      Effort: Pulmonary effort is normal.      Breath sounds: Normal breath sounds. Skin:     General: Skin is warm and dry. Neurological:      General: No focal deficit present. Mental Status: She is alert.        /82   Pulse 98   Resp 18   Ht 5' 5\" (1.651 m)   Wt 235 lb (106.6 kg)   SpO2 98%   BMI 39.11 kg/m²     Lab Review   Orders Only on 06/24/2021   Component Date Value    TSH 06/21/2021 1.38     Sodium 06/21/2021 139     Chloride 06/21/2021 101     Potassium 06/21/2021 3.2*    BUN 06/21/2021 11  CREATININE 06/21/2021 0.61     Glucose 06/21/2021 260*    AST 06/21/2021 15     ALT 06/21/2021 22     Calcium 06/21/2021 9.4     Total Protein 06/21/2021 6.4     CO2 06/21/2021 28     Albumin 06/21/2021 4.1     Alkaline Phosphatase 06/21/2021 50     Total Bilirubin 06/21/2021 0.4     Anion Gap 06/21/2021 10     Cholesterol, Fasting 06/21/2021 191     Triglyceride, Fasting 06/21/2021 325*    HDL 06/21/2021 39*    LDL Calculated 06/21/2021 87    Office Visit on 06/07/2021   Component Date Value    Hemoglobin A1C 06/07/2021 12.6*       Radiology:  X-ray chest 2 views    Result Date: 9/26/2021  XR CHEST 2 VWS CLINICAL INFORMATION: chest pain. COMPARISON: 2/13/2021. IMPRESSION: 1. No acute cardiopulmonary disease. Mild interstitial changes. No effusions. 2. Stable cardiomediastinal silhouette. 3. Calcified granulomas. 4. Bilateral calcifications adjacent to the greater tuberosity suggestive of calcific tendinitis. Workstation:[id number] Finalized by Clark Curtis MD on 9/26/2021 5:02 PM    X-ray shoulder right minimum 2 views    Result Date: 9/26/2021  XR SHOULDER RT MIN 2 VWS CLINICAL HISTORY: Right shoulder pain radiating to the chest. No known injury. COMPARISON: None. FINDINGS: 3 views of the right shoulder obtained. No acute fracture. No aggressive osseous lesion. Normal anatomic alignment. Minimal degenerative changes of the right glenohumeral and acromioclavicular joints. Calcific density overlying the supraspinatus tendon. IMPRESSION: * No acute fracture or malalignment. * Calcification overlying the supraspinatus tendon compatible with calcific tendinitis. Approved by Resident: Crystal Boyle MD on 9/26/2021 5:01 PM IClark MD have personally reviewed the image(s) and agree with and/or edited the report Workstation:[id number] Finalized by Clark Curtis MD on 9/26/2021 5:17 PM        Assessment and Plan      1.  Hyperlipidemia, unspecified hyperlipidemia type  -     rosuvastatin (CRESTOR) 10 MG tablet; Take 1 tablet by mouth daily, Disp-30 tablet, R-5Normal  2. Type 2 diabetes mellitus without complication, without long-term current use of insulin (HCC)  -     POCT glycosylated hemoglobin (Hb A1C)  -     pioglitazone (ACTOS) 15 MG tablet; Take 1 tablet by mouth daily, Disp-30 tablet, R-3Normal  3. Diarrhea, unspecified type  -     Chance Rogel MD, Gastroenterology, [address]  4. Chronic midline low back pain, unspecified whether sciatica present  -     meloxicam (MOBIC) 7.5 MG tablet; Take 1 tablet by mouth daily as needed for Pain, Disp-30 tablet, R-0Normal           Patient's diabetes A1c significantly improved, will add Actos 50 mg once daily in addition to Janumet max dosing  Down from 12.3 - 8.2 today    Regarding chronic diarrhea encouraged to take Metformin with food, also due for colonoscopy will evaluate    Regarding chronic low back pain and right shoulder pain x-ray imaging reviewed, will trial meloxicam, order for MRI remains active, will complete peer to peer once receiving PT records    Follow-up in 4 months, sooner as needed            Results for orders placed or performed in visit on 10/04/21   POCT glycosylated hemoglobin (Hb A1C)   Result Value Ref Range    Hemoglobin A1C 8.2 %             Return in about 4 months (around 2/4/2022), or if symptoms worsen or fail to improve. Orders Placed This Encounter   Medications    rosuvastatin (CRESTOR) 10 MG tablet     Sig: Take 1 tablet by mouth daily     Dispense:  30 tablet     Refill:  5    pioglitazone (ACTOS) 15 MG tablet     Sig: Take 1 tablet by mouth daily     Dispense:  30 tablet     Refill:  3    meloxicam (MOBIC) 7.5 MG tablet     Sig: Take 1 tablet by mouth daily as needed for Pain     Dispense:  30 tablet     Refill:  0        Patient given educational materials - see patient instructions. Discussed use, benefit, and side effects of prescribed medications. All patientquestions answered.   Pt voiced understanding. Patient given educational materials - see patient instructions. Discussed use, benefit, and side effects of prescribed medications. All patientquestions answered. Pt voiced understanding. This note was transcribed using dictation with Dragon services. Efforts were made to correct any errors but some words may be misinterpreted.     Electronically signed by FAITH Chambers CNP on 10/4/2021at 11:55 AM

## 2021-11-08 ENCOUNTER — TELEPHONE (OUTPATIENT)
Dept: GASTROENTEROLOGY | Age: 61
End: 2021-11-08

## 2021-11-08 ENCOUNTER — TELEPHONE (OUTPATIENT)
Dept: FAMILY MEDICINE CLINIC | Age: 61
End: 2021-11-08

## 2021-11-08 DIAGNOSIS — G89.29 CHRONIC MIDLINE LOW BACK PAIN, UNSPECIFIED WHETHER SCIATICA PRESENT: Primary | ICD-10-CM

## 2021-11-08 DIAGNOSIS — M54.50 CHRONIC MIDLINE LOW BACK PAIN, UNSPECIFIED WHETHER SCIATICA PRESENT: Primary | ICD-10-CM

## 2021-11-08 DIAGNOSIS — M54.50 CHRONIC MIDLINE LOW BACK PAIN, UNSPECIFIED WHETHER SCIATICA PRESENT: ICD-10-CM

## 2021-11-08 DIAGNOSIS — G89.29 CHRONIC MIDLINE LOW BACK PAIN, UNSPECIFIED WHETHER SCIATICA PRESENT: ICD-10-CM

## 2021-11-08 RX ORDER — MELOXICAM 7.5 MG/1
TABLET ORAL
Qty: 30 TABLET | Refills: 0 | Status: SHIPPED | OUTPATIENT
Start: 2021-11-08 | End: 2021-12-06

## 2021-11-08 NOTE — TELEPHONE ENCOUNTER
Patient called regarding her MRI you ordered. States it was denied after peer to peer. We requesting PT reports and looks like we received them. Patient is asking what the next step is? Please advise.

## 2021-11-08 NOTE — TELEPHONE ENCOUNTER
Pt called and LVM asking us to giver her a call. Pt did not leave a detailed message. Called pt back, and LVM telling pt to call us back.

## 2021-11-29 ENCOUNTER — HOSPITAL ENCOUNTER (OUTPATIENT)
Dept: MRI IMAGING | Facility: CLINIC | Age: 61
Discharge: HOME | End: 2021-12-01
Payer: COMMERCIAL

## 2021-11-29 DIAGNOSIS — G89.29 CHRONIC MIDLINE LOW BACK PAIN, UNSPECIFIED WHETHER SCIATICA PRESENT: ICD-10-CM

## 2021-11-29 DIAGNOSIS — M54.50 CHRONIC MIDLINE LOW BACK PAIN, UNSPECIFIED WHETHER SCIATICA PRESENT: ICD-10-CM

## 2021-11-29 DIAGNOSIS — M51.36 BULGING LUMBAR DISC: Primary | ICD-10-CM

## 2021-11-29 PROCEDURE — 72148 MRI LUMBAR SPINE W/O DYE: CPT

## 2021-12-06 ENCOUNTER — OFFICE VISIT (OUTPATIENT)
Dept: FAMILY MEDICINE CLINIC | Age: 61
End: 2021-12-06
Payer: COMMERCIAL

## 2021-12-06 VITALS
OXYGEN SATURATION: 98 % | WEIGHT: 227 LBS | BODY MASS INDEX: 37.77 KG/M2 | DIASTOLIC BLOOD PRESSURE: 76 MMHG | SYSTOLIC BLOOD PRESSURE: 138 MMHG | RESPIRATION RATE: 16 BRPM | HEART RATE: 95 BPM

## 2021-12-06 DIAGNOSIS — E11.9 TYPE 2 DIABETES MELLITUS WITHOUT COMPLICATION, WITHOUT LONG-TERM CURRENT USE OF INSULIN (HCC): ICD-10-CM

## 2021-12-06 DIAGNOSIS — B37.2 YEAST DERMATITIS: ICD-10-CM

## 2021-12-06 DIAGNOSIS — E78.5 HYPERLIPIDEMIA, UNSPECIFIED HYPERLIPIDEMIA TYPE: ICD-10-CM

## 2021-12-06 DIAGNOSIS — E03.9 HYPOTHYROIDISM, UNSPECIFIED TYPE: ICD-10-CM

## 2021-12-06 DIAGNOSIS — G89.29 CHRONIC LEFT-SIDED LOW BACK PAIN WITHOUT SCIATICA: Primary | ICD-10-CM

## 2021-12-06 DIAGNOSIS — M54.50 CHRONIC LEFT-SIDED LOW BACK PAIN WITHOUT SCIATICA: Primary | ICD-10-CM

## 2021-12-06 PROCEDURE — 3052F HG A1C>EQUAL 8.0%<EQUAL 9.0%: CPT | Performed by: NURSE PRACTITIONER

## 2021-12-06 PROCEDURE — 99214 OFFICE O/P EST MOD 30 MIN: CPT | Performed by: NURSE PRACTITIONER

## 2021-12-06 RX ORDER — MELOXICAM 15 MG/1
15 TABLET ORAL DAILY
Qty: 30 TABLET | Refills: 2 | Status: SHIPPED | OUTPATIENT
Start: 2021-12-06 | End: 2022-03-30

## 2021-12-06 RX ORDER — CLOTRIMAZOLE 1 %
CREAM (GRAM) TOPICAL 2 TIMES DAILY
Qty: 1 EACH | Refills: 1 | Status: SHIPPED | OUTPATIENT
Start: 2021-12-06 | End: 2022-08-29

## 2021-12-06 RX ORDER — SITAGLIPTIN AND METFORMIN HYDROCHLORIDE 1000; 50 MG/1; MG/1
1 TABLET, FILM COATED ORAL 2 TIMES DAILY WITH MEALS
Qty: 60 TABLET | Refills: 5 | Status: SHIPPED | OUTPATIENT
Start: 2021-12-06 | End: 2022-07-08

## 2021-12-06 RX ORDER — CYCLOBENZAPRINE HCL 10 MG
10 TABLET ORAL 2 TIMES DAILY PRN
Qty: 60 TABLET | Refills: 1 | Status: SHIPPED | OUTPATIENT
Start: 2021-12-06 | End: 2022-05-09

## 2021-12-06 ASSESSMENT — ENCOUNTER SYMPTOMS
SHORTNESS OF BREATH: 0
DIARRHEA: 0
BACK PAIN: 1
VOMITING: 0
ABDOMINAL PAIN: 0
SORE THROAT: 0
COUGH: 0
SINUS PAIN: 0
NAUSEA: 0

## 2021-12-06 NOTE — PROGRESS NOTES
sleeve  Right knee 1 each 0     No current facility-administered medications for this visit. Allergies   Allergen Reactions    Insulins      Feels like she is having a stroke      Sulfa Antibiotics Hives       Subjective:      Review of Systems   Constitutional: Negative for chills and fever. HENT: Negative for ear pain, sinus pain and sore throat. Respiratory: Negative for cough and shortness of breath. Cardiovascular: Negative for chest pain and palpitations. Gastrointestinal: Negative for abdominal pain, diarrhea, nausea and vomiting. Musculoskeletal: Positive for back pain. Neurological: Negative for dizziness and headaches. All other systems reviewed and are negative.      :Objective     Physical Exam  Vitals and nursing note reviewed. Constitutional:       General: She is not in acute distress. Appearance: Normal appearance. She is not toxic-appearing. Cardiovascular:      Rate and Rhythm: Normal rate. Pulmonary:      Effort: Pulmonary effort is normal.      Breath sounds: Normal breath sounds. Musculoskeletal:      Lumbar back: Spasms, tenderness and bony tenderness present. Skin:     General: Skin is warm and dry. Neurological:      General: No focal deficit present. Mental Status: She is alert and oriented to person, place, and time.        /76   Pulse 95   Resp 16   Wt 227 lb (103 kg)   SpO2 98%   BMI 37.77 kg/m²     Lab Review   Office Visit on 10/04/2021   Component Date Value    Hemoglobin A1C 10/04/2021 8.2    Orders Only on 06/24/2021   Component Date Value    TSH 06/21/2021 1.38     Sodium 06/21/2021 139     Chloride 06/21/2021 101     Potassium 06/21/2021 3.2*    BUN 06/21/2021 11     CREATININE 06/21/2021 0.61     Glucose 06/21/2021 260*    AST 06/21/2021 15     ALT 06/21/2021 22     Calcium 06/21/2021 9.4     Total Protein 06/21/2021 6.4     CO2 06/21/2021 28     Albumin 06/21/2021 4.1     Alkaline Phosphatase 06/21/2021 50     Total Bilirubin 06/21/2021 0.4     Anion Gap 06/21/2021 10     Cholesterol, Fasting 06/21/2021 191     Triglyceride, Fasting 06/21/2021 325*    HDL 06/21/2021 39*    LDL Calculated 06/21/2021 87    Office Visit on 06/07/2021   Component Date Value    Hemoglobin A1C 06/07/2021 12.6*       Assessment and Plan      1. Chronic left-sided low back pain without sciatica  -     cyclobenzaprine (FLEXERIL) 10 MG tablet; Take 1 tablet by mouth 2 times daily as needed for Muscle spasms, Disp-60 tablet, R-1Normal  -     meloxicam (MOBIC) 15 MG tablet; Take 1 tablet by mouth daily, Disp-30 tablet, R-2Normal  -     CBC With Auto Differential; Future  2. Type 2 diabetes mellitus without complication, without long-term current use of insulin (HCC)  -     Hemoglobin A1C; Future  -     CBC With Auto Differential; Future  -     Comprehensive Metabolic Panel; Future  -     Microalbumin, Ur; Future  3. Hyperlipidemia, unspecified hyperlipidemia type  -     Lipid Panel; Future  -     CBC With Auto Differential; Future  4. Hypothyroidism, unspecified type  -     TSH With Reflex Ft4; Future  -     CBC With Auto Differential; Future  5. Yeast dermatitis  -     clotrimazole (LOTRIMIN) 1 % cream; Apply topically 2 times daily Apply topically 2 times daily. , Topical, 2 TIMES DAILY Starting Mon 12/6/2021, Disp-1 each, R-1, Normal       EXAMINATION:   MRI OF THE LUMBAR SPINE WITHOUT CONTRAST, 11/29/2021 12:40 pm       TECHNIQUE:   Multiplanar multisequence MRI of the lumbar spine was performed without the   administration of intravenous contrast.       COMPARISON:   None.       HISTORY:   ORDERING SYSTEM PROVIDED HISTORY: Chronic midline low back pain, unspecified   whether sciatica present   TECHNOLOGIST PROVIDED HISTORY:   Reason for Exam: LOW BACK PAIN X8 MONTHS. INJURY 1/21/21. STRAIN/SPRAIN. RIGHT HIP/LEG PAIN. Acuity: Acute   Type of Exam: Unknown       FINDINGS:   BONES/ALIGNMENT: There is normal alignment of the spine.  The  clotrimazole (LOTRIMIN) 1 % cream     Sig: Apply topically 2 times daily Apply topically 2 times daily. Dispense:  1 each     Refill:  1    sitaGLIPtan-metFORMIN (JANUMET)  MG per tablet     Sig: Take 1 tablet by mouth 2 times daily (with meals)     Dispense:  60 tablet     Refill:  5    meloxicam (MOBIC) 15 MG tablet     Sig: Take 1 tablet by mouth daily     Dispense:  30 tablet     Refill:  2        Patient given educational materials - see patient instructions. Discussed use, benefit, and side effects of prescribed medications. All patientquestions answered. Pt voiced understanding. Patient given educational materials - see patient instructions. Discussed use, benefit, and side effects of prescribed medications. All patientquestions answered. Pt voiced understanding. This note was transcribed using dictation with Dragon services. Efforts were made to correct any errors but some words may be misinterpreted.     Electronically signed by FAITH Barros CNP on 12/6/2021at 12:30 PM

## 2021-12-06 NOTE — PATIENT INSTRUCTIONS

## 2021-12-15 ENCOUNTER — APPOINTMENT (OUTPATIENT)
Dept: GENERAL RADIOLOGY | Age: 61
End: 2021-12-15
Payer: COMMERCIAL

## 2021-12-15 ENCOUNTER — HOSPITAL ENCOUNTER (EMERGENCY)
Age: 61
Discharge: HOME | End: 2021-12-15
Attending: EMERGENCY MEDICINE
Payer: COMMERCIAL

## 2021-12-15 VITALS
SYSTOLIC BLOOD PRESSURE: 103 MMHG | TEMPERATURE: 98.4 F | BODY MASS INDEX: 37.82 KG/M2 | DIASTOLIC BLOOD PRESSURE: 56 MMHG | HEIGHT: 65 IN | HEART RATE: 108 BPM | WEIGHT: 227 LBS | RESPIRATION RATE: 18 BRPM | OXYGEN SATURATION: 94 %

## 2021-12-15 DIAGNOSIS — E87.6 HYPOKALEMIA: ICD-10-CM

## 2021-12-15 DIAGNOSIS — U07.1 PNEUMONIA DUE TO COVID-19 VIRUS: Primary | ICD-10-CM

## 2021-12-15 DIAGNOSIS — E83.42 HYPOMAGNESEMIA: ICD-10-CM

## 2021-12-15 DIAGNOSIS — J12.82 PNEUMONIA DUE TO COVID-19 VIRUS: Primary | ICD-10-CM

## 2021-12-15 LAB
ABSOLUTE EOS #: 0 K/UL (ref 0–0.44)
ABSOLUTE IMMATURE GRANULOCYTE: 0.04 K/UL (ref 0–0.3)
ABSOLUTE LYMPH #: 0.46 K/UL (ref 1.1–3.7)
ABSOLUTE MONO #: 0.23 K/UL (ref 0.1–1.2)
ANION GAP SERPL CALCULATED.3IONS-SCNC: 17 MMOL/L (ref 9–17)
BASOPHILS # BLD: 0 % (ref 0–2)
BASOPHILS ABSOLUTE: 0 K/UL (ref 0–0.2)
BUN BLDV-MCNC: 16 MG/DL (ref 8–23)
BUN/CREAT BLD: 22 (ref 9–20)
CALCIUM SERPL-MCNC: 9.1 MG/DL (ref 8.6–10.4)
CHLORIDE BLD-SCNC: 100 MMOL/L (ref 98–107)
CO2: 25 MMOL/L (ref 20–31)
CREAT SERPL-MCNC: 0.72 MG/DL (ref 0.5–0.9)
DIFFERENTIAL TYPE: ABNORMAL
EKG ATRIAL RATE: 109 BPM
EKG P AXIS: 38 DEGREES
EKG P-R INTERVAL: 130 MS
EKG Q-T INTERVAL: 356 MS
EKG QRS DURATION: 74 MS
EKG QTC CALCULATION (BAZETT): 479 MS
EKG R AXIS: -14 DEGREES
EKG T AXIS: 84 DEGREES
EKG VENTRICULAR RATE: 109 BPM
EOSINOPHILS RELATIVE PERCENT: 0 % (ref 1–4)
GFR AFRICAN AMERICAN: >60 ML/MIN
GFR NON-AFRICAN AMERICAN: >60 ML/MIN
GFR SERPL CREATININE-BSD FRML MDRD: ABNORMAL ML/MIN/{1.73_M2}
GFR SERPL CREATININE-BSD FRML MDRD: ABNORMAL ML/MIN/{1.73_M2}
GLUCOSE BLD-MCNC: 244 MG/DL (ref 70–99)
HCT VFR BLD CALC: 40.8 % (ref 36.3–47.1)
HEMOGLOBIN: 13.2 G/DL (ref 11.9–15.1)
IMMATURE GRANULOCYTES: 1 %
LYMPHOCYTES # BLD: 12 % (ref 24–43)
MAGNESIUM: 1.4 MG/DL (ref 1.6–2.6)
MCH RBC QN AUTO: 29.6 PG (ref 25.2–33.5)
MCHC RBC AUTO-ENTMCNC: 32.4 G/DL (ref 28.4–34.8)
MCV RBC AUTO: 91.5 FL (ref 82.6–102.9)
MONOCYTES # BLD: 6 % (ref 3–12)
MYOGLOBIN: 64 NG/ML (ref 25–58)
MYOGLOBIN: 65 NG/ML (ref 25–58)
NRBC AUTOMATED: 0 PER 100 WBC
PDW BLD-RTO: 12.2 % (ref 11.8–14.4)
PLATELET # BLD: 207 K/UL (ref 138–453)
PLATELET ESTIMATE: ABNORMAL
PMV BLD AUTO: 10.5 FL (ref 8.1–13.5)
POTASSIUM SERPL-SCNC: 3 MMOL/L (ref 3.7–5.3)
RBC # BLD: 4.46 M/UL (ref 3.95–5.11)
RBC # BLD: ABNORMAL 10*6/UL
SARS-COV-2, RAPID: DETECTED
SEG NEUTROPHILS: 81 % (ref 36–65)
SEGMENTED NEUTROPHILS ABSOLUTE COUNT: 3.07 K/UL (ref 1.5–8.1)
SODIUM BLD-SCNC: 142 MMOL/L (ref 135–144)
SPECIMEN DESCRIPTION: ABNORMAL
TROPONIN INTERP: ABNORMAL
TROPONIN INTERP: ABNORMAL
TROPONIN T: ABNORMAL NG/ML
TROPONIN T: ABNORMAL NG/ML
TROPONIN, HIGH SENSITIVITY: 20 NG/L (ref 0–14)
TROPONIN, HIGH SENSITIVITY: 23 NG/L (ref 0–14)
WBC # BLD: 3.8 K/UL (ref 3.5–11.3)
WBC # BLD: ABNORMAL 10*3/UL

## 2021-12-15 PROCEDURE — 83874 ASSAY OF MYOGLOBIN: CPT

## 2021-12-15 PROCEDURE — 96366 THER/PROPH/DIAG IV INF ADDON: CPT

## 2021-12-15 PROCEDURE — 85025 COMPLETE CBC W/AUTO DIFF WBC: CPT

## 2021-12-15 PROCEDURE — 84484 ASSAY OF TROPONIN QUANT: CPT

## 2021-12-15 PROCEDURE — 99283 EMERGENCY DEPT VISIT LOW MDM: CPT

## 2021-12-15 PROCEDURE — 71045 X-RAY EXAM CHEST 1 VIEW: CPT

## 2021-12-15 PROCEDURE — 83735 ASSAY OF MAGNESIUM: CPT

## 2021-12-15 PROCEDURE — 6370000000 HC RX 637 (ALT 250 FOR IP): Performed by: NURSE PRACTITIONER

## 2021-12-15 PROCEDURE — 87635 SARS-COV-2 COVID-19 AMP PRB: CPT

## 2021-12-15 PROCEDURE — 2580000003 HC RX 258: Performed by: NURSE PRACTITIONER

## 2021-12-15 PROCEDURE — 96365 THER/PROPH/DIAG IV INF INIT: CPT

## 2021-12-15 PROCEDURE — 6360000002 HC RX W HCPCS: Performed by: NURSE PRACTITIONER

## 2021-12-15 PROCEDURE — 93005 ELECTROCARDIOGRAM TRACING: CPT | Performed by: NURSE PRACTITIONER

## 2021-12-15 PROCEDURE — 80048 BASIC METABOLIC PNL TOTAL CA: CPT

## 2021-12-15 RX ORDER — POTASSIUM CHLORIDE 20 MEQ/1
20 TABLET, EXTENDED RELEASE ORAL DAILY
Qty: 3 TABLET | Refills: 0 | Status: SHIPPED | OUTPATIENT
Start: 2021-12-15 | End: 2022-02-25 | Stop reason: SDUPTHER

## 2021-12-15 RX ORDER — IBUPROFEN 600 MG/1
600 TABLET ORAL EVERY 8 HOURS PRN
Qty: 20 TABLET | Refills: 0 | Status: SHIPPED | OUTPATIENT
Start: 2021-12-15 | End: 2022-07-11

## 2021-12-15 RX ORDER — POTASSIUM CHLORIDE 20 MEQ/1
40 TABLET, EXTENDED RELEASE ORAL ONCE
Status: COMPLETED | OUTPATIENT
Start: 2021-12-15 | End: 2021-12-15

## 2021-12-15 RX ORDER — MAGNESIUM SULFATE IN WATER 40 MG/ML
2000 INJECTION, SOLUTION INTRAVENOUS ONCE
Status: COMPLETED | OUTPATIENT
Start: 2021-12-15 | End: 2021-12-15

## 2021-12-15 RX ORDER — 0.9 % SODIUM CHLORIDE 0.9 %
1000 INTRAVENOUS SOLUTION INTRAVENOUS ONCE
Status: COMPLETED | OUTPATIENT
Start: 2021-12-15 | End: 2021-12-15

## 2021-12-15 RX ADMIN — MAGNESIUM SULFATE HEPTAHYDRATE 2000 MG: 40 INJECTION, SOLUTION INTRAVENOUS at 11:46

## 2021-12-15 RX ADMIN — SODIUM CHLORIDE 1000 ML: 9 INJECTION, SOLUTION INTRAVENOUS at 10:18

## 2021-12-15 RX ADMIN — POTASSIUM CHLORIDE 40 MEQ: 1500 TABLET, EXTENDED RELEASE ORAL at 11:49

## 2021-12-15 ASSESSMENT — ENCOUNTER SYMPTOMS
DIARRHEA: 1
NAUSEA: 0
BACK PAIN: 1
SHORTNESS OF BREATH: 1
ABDOMINAL PAIN: 0
COUGH: 1
WHEEZING: 0
VOMITING: 0
RHINORRHEA: 1

## 2021-12-15 NOTE — ED NOTES
Pt to er with c/o sob with fatigue. Pt states she has had s/sx for past 2-3 weeks. Pt states her mom and dad whom she resides with currently has covid. Pt denies known fevers. Pt denies chest pain. Pt a&ox3. Skin warm and dry. Respirations even and non-labored.       Dejan Escobedo RN  12/15/21 2040

## 2021-12-15 NOTE — ED PROVIDER NOTES
Extraocular movements intact. Conjunctiva/sclera: Conjunctivae normal.      Pupils: Pupils are equal, round, and reactive to light. Cardiovascular:      Rate and Rhythm: Regular rhythm. Tachycardia present. Pulses: Normal pulses. Heart sounds: Normal heart sounds and S2 normal.   Pulmonary:      Effort: Tachypnea present. No respiratory distress. Breath sounds: Normal breath sounds. No decreased breath sounds, wheezing, rhonchi or rales. Abdominal:      General: Bowel sounds are normal.      Palpations: Abdomen is soft. Tenderness: There is no abdominal tenderness. There is no guarding or rebound. Musculoskeletal:         General: Normal range of motion. Cervical back: Normal range of motion and neck supple. Right lower leg: No edema. Left lower leg: No edema. Skin:     General: Skin is warm and dry. Capillary Refill: Capillary refill takes less than 2 seconds. Neurological:      Mental Status: She is alert and oriented to person, place, and time. Psychiatric:         Mood and Affect: Mood normal.         Behavior: Behavior normal.         Thought Content: Thought content normal.         Judgment: Judgment normal.           DIAGNOSTIC RESULTS     EKG:All EKG's are interpreted by the Emergency Department Physician who either signs or Co-signs this chart in the absence of a cardiologist.    EKG interpreted by attending physician    RADIOLOGY:   Non-plain film images such as CT, Ultrasound and MRI are read by theradiologist. Plain radiographic images are visualized and preliminarily interpreted by the emergency physician with the below findings:    MRI LUMBAR SPINE WO CONTRAST    Result Date: 11/29/2021  EXAMINATION: MRI OF THE LUMBAR SPINE WITHOUT CONTRAST, 11/29/2021 12:40 pm TECHNIQUE: Multiplanar multisequence MRI of the lumbar spine was performed without the administration of intravenous contrast. COMPARISON: None.  HISTORY: ORDERING SYSTEM PROVIDED HISTORY: Chronic midline low back pain, unspecified whether sciatica present TECHNOLOGIST PROVIDED HISTORY: Reason for Exam: LOW BACK PAIN X8 MONTHS. INJURY 1/21/21. STRAIN/SPRAIN. RIGHT HIP/LEG PAIN. Acuity: Acute Type of Exam: Unknown FINDINGS: BONES/ALIGNMENT: There is normal alignment of the spine. The vertebral body heights are maintained. The bone marrow signal appears unremarkable. SPINAL CORD: The conus terminates normally. SOFT TISSUES: No paraspinal mass identified. L1-L2: Disc desiccation without herniation. No significant central canal, lateral recess or neural foraminal stenoses. L2-L3: Disc desiccation with minimal disc bulge. Mild facet and ligamentum flavum hypertrophy. No significant central canal or lateral recess stenosis. Mild right neural foraminal stenosis. L3-L4: Disc desiccation with small disc bulge. Mild facet and ligamentous hypertrophy. No significant central canal or lateral recess stenosis. Mild neural foraminal stenoses. Trace fluid in the right facet joint likely due to arthrosis. L4-L5: The minimal disc bulge. Mild facet and ligamentous hypertrophy. No significant central canal or lateral recess stenosis. Mild right and moderate left neural foraminal stenoses. L5-S1: Small disc bulge. No significant central canal or lateral recess stenosis. Mild neural foraminal stenoses. 1.  No fracture or bony destructive lesion. 2. Degenerative changes. No significant central canal stenosis. 3.  Multilevel neural foraminal stenoses, worst (moderate) at the left L4-5 level. 4. Trace fluid in the right facet joint at T3-4 is likely due to arthrosis. XR CHEST PORTABLE    Result Date: 12/15/2021  EXAMINATION: ONE XRAY VIEW OF THE CHEST 12/15/2021 11:53 am COMPARISON: None. HISTORY: ORDERING SYSTEM PROVIDED HISTORY: cough, sob TECHNOLOGIST PROVIDED HISTORY: cough, sob Reason for Exam: SOB and cough. History of diabetes FINDINGS: Overlying ECG monitor leads. Cardiomediastinal shadow WNL. Hazy ground-glass opacities lung peripheries, better seen toward the bases with some likely left basilar atelectasis. No sizable pleural effusion or pneumothorax. 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 DJD right shoulder. Commonly reported imaging features of COVID-19 pneumonia are present. Other processes such as influenza pneumonia and organizing pneumonia, as can be seen with drug toxicity and connective tissue disease, can cause a similar imaging pattern. Interpretation per the Radiologist below, if available at the time of this note:    XR CHEST PORTABLE   Final Result   Commonly reported imaging features of COVID-19 pneumonia are present. Other   processes such as influenza pneumonia and organizing pneumonia, as can be   seen with drug toxicity and connective tissue disease, can cause a similar   imaging pattern.                EDBEDSIDE ULTRASOUND:   Performed by Chet Decker - none    LABS:  Labs Reviewed   COVID-19, RAPID - Abnormal; Notable for the following components:       Result Value    SARS-CoV-2, Rapid DETECTED (*)     All other components within normal limits   TROP/MYOGLOBIN - Abnormal; Notable for the following components:    Troponin, High Sensitivity 23 (*)     Myoglobin 65 (*)     All other components within normal limits   TROP/MYOGLOBIN - Abnormal; Notable for the following components:    Troponin, High Sensitivity 20 (*)     Myoglobin 64 (*)     All other components within normal limits   CBC WITH AUTO DIFFERENTIAL - Abnormal; Notable for the following components:    Seg Neutrophils 81 (*)     Lymphocytes 12 (*)     Eosinophils % 0 (*)     Immature Granulocytes 1 (*)     Absolute Lymph # 0.46 (*)     All other components within normal limits   BASIC METABOLIC PANEL - Abnormal; Notable for the following components:    Glucose 244 (*)     Bun/Cre Ratio 22 (*)     Potassium 3.0 (*)     All other components within normal limits   MAGNESIUM - Abnormal; Notable for the following components:    Magnesium 1.4 (*)     All other components within normal limits       All other labs were within normal range or not returned as of this dictation. EMERGENCY DEPARTMENT COURSE andDIFFERENTIAL DIAGNOSIS/MDM:   Patient valuated in conjunction with attending physician. Rapid Covid test positive. Patient has not had Covid vaccine. SPO2 94% on room air. Afebrile. Patient able to ambulate out difficulty. Labs reviewed. Magnesium 1.4, K3.0. Patient states she had some diarrhea the past several days. She was given magnesium and potassium supplements in the ED. Also given IV fluids. No abdominal pain. Chest x-ray per radiologist shows Hazy ground-glass opacities lung peripheries, better seen toward the bases with some likely left basilar atelectasis. No sizable pleural effusion or pneumothorax. Prescription written for potassium chloride for 3 days and Motrin as needed for pain. Follow-up with PCP. Return to the ED if symptoms worsen. Vitals:    Vitals:    12/15/21 0903 12/15/21 1217 12/15/21 1249   BP: 112/66 118/62 (!) 103/56   Pulse: 118 106 108   Resp: 18 22 18   Temp: 98.4 °F (36.9 °C)     TempSrc: Oral     SpO2: 92% 93% 94%   Weight: 227 lb (103 kg)     Height: 5' 5\" (1.651 m)           CONSULTS:  None    RES:  Procedures    FINAL IMPRESSION      1. Pneumonia due to COVID-19 virus    2. Hypokalemia    3.  Hypomagnesemia          DISPOSITION/PLAN   DISPOSITION Decision To Discharge 12/15/2021 01:42:19 PM      PATIENT REFERRED TO:   FAITH Green - CNP  [place] [address] [place] [id number]  [phone]    In 1 week      [facility] ED  [address] [facility] [address]  [phone]    If symptoms worsen      DISCHARGE MEDICATIONS:     Discharge Medication List as of 12/15/2021  1:44 PM      START taking these medications    Details   potassium chloride (KLOR-CON M) 20 MEQ extended release tablet Take 1 tablet by mouth daily for 3 days, Disp-3 tablet, R-0Print      ibuprofen (IBU) 600 MG tablet Take 1 tablet by mouth every 8 hours as needed for Pain, Disp-20 tablet, R-0Print           Electronically signed by FAITH Lee 12/15/2021 at 4:43 PM           FAITH Lee CNP  12/15/21 2097

## 2021-12-16 ENCOUNTER — CARE COORDINATION (OUTPATIENT)
Dept: CARE COORDINATION | Age: 61
End: 2021-12-16

## 2021-12-16 NOTE — ACP (ADVANCE CARE PLANNING)
Advance Care Planning   Healthcare Decision Maker: promise mother      Click here to complete Healthcare Decision Makers including selection of the Healthcare Decision Maker Relationship (ie \"Primary\"). Today we documented Decision Maker(s) consistent with Legal Next of Kin hierarchy.

## 2021-12-16 NOTE — CARE COORDINATION
Patient contacted regarding COVID-19 diagnosis. Discussed COVID-19 related testing which was available at this time. Test results were positive. Patient informed of results, if available? Yes. Ambulatory Care Manager contacted the patient by telephone to perform post discharge assessment. Call within 2 business days of discharge: Yes. Verified name and  with patient as identifiers. Provided introduction to self, and explanation of the CTN/ACM role, and reason for call due to risk factors for infection and/or exposure to COVID-19. Symptoms reviewed with patient who verbalized the following symptoms: no worsening symptoms. Due to no new or worsening symptoms encounter was not routed to provider for escalation. Discussed follow-up appointments. If no appointment was previously scheduled, appointment scheduling offered: Yes. [address] follow up appointment(s):   Future Appointments   Date Time Provider Letha Hendrix   1/3/2022  1:15 PM ST[facility] DIAG MAMMO RM 3 ST[facility] MAMMO [facility] Radiolog   2022  1:00 PM FAITH Gutiérrez - JAMIE Giron [facility]     Non-[facility] follow up appointment(s): will call     Non-face-to-face services provided:  Reviewed and followed up on pending diagnostic tests and treatments-covid      Advance Care Planning:   Does patient have an Advance Directive:  reviewed and current. Educated patient about risk for severe COVID-19 due to risk factors according to CDC guidelines. ACM reviewed discharge instructions, medical action plan and red flag symptoms with the patient who verbalized understanding. Discussed COVID vaccination status: Yes. Education provided on COVID-19 vaccination as appropriate. Discussed exposure protocols and quarantine with CDC Guidelines. Patient was given an opportunity to verbalize any questions and concerns and agrees to contact ACM or health care provider for questions related to their healthcare.     Reviewed and educated patient on any new and changed medications related to discharge diagnosis     Was patient discharged with a pulse oximeter? Yes Discussed and confirmed pulse oximeter discharge instructions and when to notify provider or seek emergency care. ACM provided contact information. Plan for follow-up call in 5-7 days based on severity of symptoms and risk factors. Spoke with pt who said she is doing a little better today her sister lives with her they both have covid pneumonia her sister was given a pulse ox. She is using this. She did check her 02 sats while on the phone her po was 89% instructed her to cough and deep breath. She will return to the ER If less then 89% she agrees.

## 2021-12-23 ENCOUNTER — CARE COORDINATION (OUTPATIENT)
Dept: CARE COORDINATION | Age: 61
End: 2021-12-23

## 2021-12-23 NOTE — CARE COORDINATION
Patient contacted regarding COVID-19 diagnosis. Discussed COVID-19 related testing which was available at this time. Test results were positive. Patient informed of results, if available? Yes    Ambulatory Care Manager contacted the patient by telephone to perform follow-up assessment. Verified name and  with patient as identifiers. Patient has following risk factors of: no known risk factors. Symptoms reviewed with patient who verbalized the following symptoms: no worsening symptoms. Due to no new or worsening symptoms encounter was not routed to provider for escalation. Educated patient about risk for severe COVID-19 due to risk factors according to CDC guidelines. ACM reviewed discharge instructions, medical action plan and red flag symptoms with the patient who verbalized understanding. Discussed COVID vaccination status: Yes. Education provided on COVID-19 vaccination as appropriate. Discussed exposure protocols and quarantine with CDC Guidelines. Patient was given an opportunity to verbalize any questions and concerns and agrees to contact ACM or health care provider for questions related to their healthcare. Was patient discharged with a pulse oximeter? Yes Discussed and confirmed pulse oximeter discharge instructions and when to notify provider or seek emergency care. ACM provided contact information. No further follow-up call identified based on severity of symptoms and risk factors.  Spoke with pt who said she is feeling better she will call her pcp for follow up she is out of quarantine at this time

## 2022-01-27 RX ORDER — LEVOTHYROXINE, LIOTHYRONINE 38; 9 UG/1; UG/1
TABLET ORAL
Qty: 30 TABLET | Refills: 2 | Status: SHIPPED | OUTPATIENT
Start: 2022-01-27 | End: 2022-05-23

## 2022-02-21 ENCOUNTER — OFFICE VISIT (OUTPATIENT)
Dept: FAMILY MEDICINE CLINIC | Age: 62
End: 2022-02-21
Payer: COMMERCIAL

## 2022-02-21 VITALS
BODY MASS INDEX: 36.32 KG/M2 | SYSTOLIC BLOOD PRESSURE: 130 MMHG | HEART RATE: 102 BPM | HEIGHT: 65 IN | WEIGHT: 218 LBS | DIASTOLIC BLOOD PRESSURE: 72 MMHG | OXYGEN SATURATION: 98 % | TEMPERATURE: 97.2 F

## 2022-02-21 DIAGNOSIS — R19.7 DIARRHEA, UNSPECIFIED TYPE: ICD-10-CM

## 2022-02-21 DIAGNOSIS — R10.9 ABDOMINAL PAIN, UNSPECIFIED ABDOMINAL LOCATION: ICD-10-CM

## 2022-02-21 DIAGNOSIS — E11.9 TYPE 2 DIABETES MELLITUS WITHOUT COMPLICATION, WITHOUT LONG-TERM CURRENT USE OF INSULIN (HCC): Primary | ICD-10-CM

## 2022-02-21 DIAGNOSIS — E78.5 HYPERLIPIDEMIA, UNSPECIFIED HYPERLIPIDEMIA TYPE: ICD-10-CM

## 2022-02-21 DIAGNOSIS — E03.9 HYPOTHYROIDISM, UNSPECIFIED TYPE: ICD-10-CM

## 2022-02-21 PROCEDURE — 99213 OFFICE O/P EST LOW 20 MIN: CPT | Performed by: NURSE PRACTITIONER

## 2022-02-21 ASSESSMENT — ENCOUNTER SYMPTOMS
COUGH: 0
ABDOMINAL PAIN: 1
BACK PAIN: 1
SHORTNESS OF BREATH: 0
SINUS PAIN: 0
NAUSEA: 1
SORE THROAT: 0
DIARRHEA: 1
VOMITING: 0

## 2022-02-21 NOTE — PATIENT INSTRUCTIONS
Patient Education        Abdominal Pain: Care Instructions  Your Care Instructions     Abdominal pain has many possible causes. Some aren't serious and get better on their own in a few days. Others need more testing and treatment. If your pain continues or gets worse, you need to be rechecked and may need more tests to find out what is wrong. You may need surgery to correct the problem. Don't ignore new symptoms, such as fever, nausea and vomiting, urination problems, pain that gets worse, and dizziness. These may be signs of a more serious problem. Your doctor may have recommended a follow-up visit in the next 8 to 12 hours. If you are not getting better, you may need more tests or treatment. The doctor has checked you carefully, but problems can develop later. If you notice any problems or new symptoms, get medical treatment right away. Follow-up care is a key part of your treatment and safety. Be sure to make and go to all appointments, and call your doctor if you are having problems. It's also a good idea to know your test results and keep a list of the medicines you take. How can you care for yourself at home? · Rest until you feel better. · To prevent dehydration, drink plenty of fluids. Choose water and other clear liquids until you feel better. If you have kidney, heart, or liver disease and have to limit fluids, talk with your doctor before you increase the amount of fluids you drink. · If your stomach is upset, eat mild foods, such as rice, dry toast or crackers, bananas, and applesauce. Try eating several small meals instead of two or three large ones. · Wait until 48 hours after all symptoms have gone away before you have spicy foods, alcohol, and drinks that contain caffeine. · Do not eat foods that are high in fat. · Avoid anti-inflammatory medicines such as aspirin, ibuprofen (Advil, Motrin), and naproxen (Aleve). These can cause stomach upset.  Talk to your doctor if you take daily aspirin for another health problem. When should you call for help? Call 911 anytime you think you may need emergency care. For example, call if:    · You passed out (lost consciousness).     · You pass maroon or very bloody stools.     · You vomit blood or what looks like coffee grounds.     · You have new, severe belly pain. Call your doctor now or seek immediate medical care if:    · Your pain gets worse, especially if it becomes focused in one area of your belly.     · You have a new or higher fever.     · Your stools are black and look like tar, or they have streaks of blood.     · You have unexpected vaginal bleeding.     · You have symptoms of a urinary tract infection. These may include:  ? Pain when you urinate. ? Urinating more often than usual.  ? Blood in your urine.     · You are dizzy or lightheaded, or you feel like you may faint. Watch closely for changes in your health, and be sure to contact your doctor if:    · You are not getting better after 1 day (24 hours). Where can you learn more? Go to https://[employer]. org and sign in to your [employer] account. Enter Y020 in the [employer] box to learn more about \"Abdominal Pain: Care Instructions. \"     If you do not have an account, please click on the \"Sign Up Now\" link. Current as of: July 1, 2021               Content Version: 13.1  © 0489-2218 [employer], [employer]. Care instructions adapted under license by [facility] ([facility]). If you have questions about a medical condition or this instruction, always ask your healthcare professional. [place] [address] any warranty or liability for your use of this information.

## 2022-02-21 NOTE — PROGRESS NOTES
[id number]  [address] [facility] [address] [address]  Dept: [phone]  Dept Fax: [phone]    Len Crowell is a 57 y.o. female who presents today for her medicalconditions/complaints as noted below. Len Crowell is c/o of Follow-up (2 month follow up for back pain. ) and Abdominal Pain (ongoing abdominal pain. pt states its getting worse. pt states it is a 6/10)      HPI:     65-year-old female patient presents with complaints of follow-up. Abdominal pain, ongoing for 3-4 months, getting worse. Umbilical region, sharp and pressure. Becoming constant. Ongoing diarrhea, had seen slight improvement. Mild nausea, no vomiting. Tried nothing. Some urinary issues such as frequency, no dysuria. Worse with some movements.      Ongoing left-sided lower back pain. Had recent MRI showing several areas of disc bulging. Has been treating with Mobic and Tylenol without complete resolution. Used to take a muscle relaxant with partial benefits.   Referral to pain management placed.      Type 2 diabetes last A1c 8.2 currently on the Metformin 2000, Januvia 100, actos 15.     Elevated lipids 6 months ago, was started on Crestor 10, due for lipid recheck     Currently on NP thyroid 60, last TSH normal      Past Medical History:   Diagnosis Date    Diabetes mellitus ([facility] [address]     Hypothyroidism         Current Outpatient Medications   Medication Sig Dispense Refill    NP THYROID 60 MG tablet TAKE ONE TABLET BY MOUTH DAILY 30 tablet 2    potassium chloride (KLOR-CON M) 20 MEQ extended release tablet Take 1 tablet by mouth daily for 3 days 3 tablet 0    ibuprofen (IBU) 600 MG tablet Take 1 tablet by mouth every 8 hours as needed for Pain 20 tablet 0    Cranberry 1000 MG CAPS Take by mouth      cyclobenzaprine (FLEXERIL) 10 MG tablet Take 1 tablet by mouth 2 times daily as needed for Muscle spasms 60 tablet 1    clotrimazole (LOTRIMIN) 1 % cream Apply topically 2 times daily Apply topically 2 times daily. 1 each 1    sitaGLIPtan-metFORMIN (JANUMET)  MG per tablet Take 1 tablet by mouth 2 times daily (with meals) 60 tablet 5    meloxicam (MOBIC) 15 MG tablet Take 1 tablet by mouth daily 30 tablet 2    rosuvastatin (CRESTOR) 10 MG tablet Take 1 tablet by mouth daily 30 tablet 5    pioglitazone (ACTOS) 15 MG tablet Take 1 tablet by mouth daily 30 tablet 3    Elastic Bandages & Supports (NEOPRENE KNEE BRACE) MISC Knee sleeve  Right knee 1 each 0     No current facility-administered medications for this visit. Allergies   Allergen Reactions    Insulins      Feels like she is having a stroke      Sulfa Antibiotics Hives       Subjective:      Review of Systems   Constitutional: Negative for chills and fever. HENT: Negative for ear pain, sinus pain and sore throat. Respiratory: Negative for cough and shortness of breath. Cardiovascular: Negative for chest pain and palpitations. Gastrointestinal: Positive for abdominal pain, diarrhea and nausea. Negative for vomiting. Musculoskeletal: Positive for back pain. Neurological: Negative for dizziness and headaches. All other systems reviewed and are negative.      :Objective     Physical Exam  Vitals and nursing note reviewed. Constitutional:       General: She is not in acute distress. Appearance: Normal appearance. She is not toxic-appearing. HENT:      Mouth/Throat:      Mouth: Mucous membranes are moist.   Cardiovascular:      Rate and Rhythm: Normal rate. Pulmonary:      Effort: Pulmonary effort is normal.      Breath sounds: Normal breath sounds. Abdominal:      General: Bowel sounds are normal.      Tenderness: There is abdominal tenderness. Skin:     General: Skin is warm and dry. Neurological:      General: No focal deficit present. Mental Status: She is alert and oriented to person, place, and time.        /72 (Site: Left Upper Arm, Position: Sitting, Cuff Size: Medium Adult)   Pulse 102   Temp 97.2 °F (36.2 °C) (Tympanic)   Ht 5' 5\" (1.651 m)   Wt 218 lb (98.9 kg)   SpO2 98%   BMI 36.28 kg/m²     Lab Review   Admission on 12/15/2021, Discharged on 12/15/2021   Component Date Value    Specimen Description 12/15/2021 . NASOPHARYNGEAL SWAB     SARS-CoV-2, Rapid 12/15/2021 DETECTED*    Ventricular Rate 12/15/2021 109     Atrial Rate 12/15/2021 109     P-R Interval 12/15/2021 130     QRS Duration 12/15/2021 74     Q-T Interval 12/15/2021 356     QTc Calculation (Bazett) 12/15/2021 479     P Axis 12/15/2021 38     R Axis 12/15/2021 -14     T Axis 12/15/2021 84     Troponin, High Sensitivi* 12/15/2021 23*    Troponin T 12/15/2021 NOT REPORTED     Troponin Interp 12/15/2021 NOT REPORTED     Myoglobin 12/15/2021 65*    Troponin, High Sensitivi* 12/15/2021 20*    Troponin T 12/15/2021 NOT REPORTED     Troponin Interp 12/15/2021 NOT REPORTED     Myoglobin 12/15/2021 64*    WBC 12/15/2021 3.8     RBC 12/15/2021 4.46     Hemoglobin 12/15/2021 13.2     Hematocrit 12/15/2021 40.8     MCV 12/15/2021 91.5     MCH 12/15/2021 29.6     MCHC 12/15/2021 32.4     RDW 12/15/2021 12.2     Platelets 22/15/7292 207     MPV 12/15/2021 10.5     NRBC Automated 12/15/2021 0.0     Differential Type 12/15/2021 NOT REPORTED     WBC Morphology 12/15/2021 NOT REPORTED     RBC Morphology 12/15/2021 NOT REPORTED     Platelet Estimate 86/93/1147 NOT REPORTED     Seg Neutrophils 12/15/2021 81*    Lymphocytes 12/15/2021 12*    Monocytes 12/15/2021 6     Eosinophils % 12/15/2021 0*    Basophils 12/15/2021 0     Immature Granulocytes 12/15/2021 1*    Segs Absolute 12/15/2021 3.07     Absolute Lymph # 12/15/2021 0.46*    Absolute Mono # 12/15/2021 0.23     Absolute Eos # 12/15/2021 0.00     Basophils Absolute 12/15/2021 0.00     Absolute Immature Granul* 12/15/2021 0.04     Glucose 12/15/2021 244*    BUN 12/15/2021 16     CREATININE 12/15/2021 0.72     Bun/Cre Ratio 12/15/2021 22*    Calcium 12/15/2021 9.1     Sodium 12/15/2021 142     Potassium 12/15/2021 3.0*    Chloride 12/15/2021 100     CO2 12/15/2021 25     Anion Gap 12/15/2021 17     GFR Non- 12/15/2021 >60     GFR  12/15/2021 >60     GFR Comment 12/15/2021          GFR Staging 12/15/2021 NOT REPORTED     Magnesium 12/15/2021 1.4*   Office Visit on 10/04/2021   Component Date Value    Hemoglobin A1C 10/04/2021 8.2        Assessment and Plan      1. Type 2 diabetes mellitus without complication, without long-term current use of insulin (HCC)  -     CBC with Auto Differential; Future  -     Comprehensive Metabolic Panel; Future  -     Hemoglobin A1C; Future  2. Hypothyroidism, unspecified type  -     CBC with Auto Differential; Future  -     TSH With Reflex Ft4; Future  3. Diarrhea, unspecified type  -     CBC with Auto Differential; Future  4. Hyperlipidemia, unspecified hyperlipidemia type  -     CBC with Auto Differential; Future  -     Lipid Panel; Future  5. Abdominal pain, unspecified abdominal location  -     CBC with Auto Differential; Future  -     Lipase; Future  -     CT ABDOMEN PELVIS W IV CONTRAST Additional Contrast? None; Future  -     Urinalysis with Reflex to Culture; Future         Complete labs as ordered  Recommend CT scan abdomen pelvis  Follow-up after testing        No results found for this visit on 02/21/22. Return in about 6 weeks (around 4/4/2022), or if symptoms worsen or fail to improve. No orders of the defined types were placed in this encounter. Patient given educational materials - see patient instructions. Discussed use, benefit, and side effects of prescribed medications. All patientquestions answered. Pt voiced understanding. Patient given educational materials - see patient instructions. Discussed use, benefit, and side effects of prescribed medications. All patientquestions answered.   Pt voiced understanding. This note was transcribed using dictation with Dragon services. Efforts were made to correct any errors but some words may be misinterpreted.     Patient assumes risks associated with failure to complete recommended testing and treatments in a timely manner    Electronically signed by FAITH Herring CNP on 2/21/2022at 2:58 PM

## 2022-02-21 NOTE — PROGRESS NOTES
Visit Information    Have you changed or started any medications since your last visit including any over-the-counter medicines, vitamins, or herbal medicines? no   Are you having any side effects from any of your medications? -  no  Have you stopped taking any of your medications? Is so, why? -  no    Have you seen any other physician or provider since your last visit? No  Have you had any other diagnostic tests since your last visit? No  Have you been seen in the emergency room and/or had an admission to a hospital since we last saw you? No  Have you had your routine dental cleaning in the past 6 months? no    Have you activated your [employer] account? If not, what are your barriers?  Yes     Patient Care Team:  FAITH Mullen CNP as PCP - General (Certified Nurse Practitioner)  FAITH Mullen CNP as PCP - [facility] Provider    Medical History Review  Past Medical, Family, and Social History reviewed and does contribute to the patient presenting condition    Health Maintenance   Topic Date Due    Hepatitis C screen  Never done    Pneumococcal 0-64 years Vaccine (1 of 2 - PPSV23) Never done    Diabetic foot exam  Never done    HIV screen  Never done    Diabetic microalbuminuria test  Never done    Diabetic retinal exam  Never done    Cervical cancer screen  Never done    Colorectal Cancer Screen  Never done    Breast cancer screen  Never done    Shingles Vaccine (1 of 2) Never done    Flu vaccine (1) Never done    DTaP/Tdap/Td vaccine (2 - Td or Tdap) 11/10/2021    COVID-19 Vaccine (1) 07/01/2022 (Originally 12/11/1965)    Depression Screen  06/07/2022    Lipid screen  06/21/2022    TSH testing  06/21/2022    A1C test (Diabetic or Prediabetic)  10/04/2022    Hepatitis A vaccine  Aged Out    Hib vaccine  Aged Out    Meningococcal (ACWY) vaccine  Aged Out

## 2022-02-23 LAB
ALBUMIN SERPL-MCNC: 4 G/DL
ALP BLD-CCNC: 48 U/L
ALT SERPL-CCNC: 13 U/L
ANION GAP SERPL CALCULATED.3IONS-SCNC: 10 MMOL/L
AST SERPL-CCNC: 12 U/L
AVERAGE GLUCOSE: 214
BASOPHILS ABSOLUTE: NORMAL
BASOPHILS RELATIVE PERCENT: NORMAL
BILIRUB SERPL-MCNC: 0.5 MG/DL (ref 0.1–1.4)
BILIRUBIN URINE: 0 MG/DL
BLOOD, URINE: NEGATIVE
BUN BLDV-MCNC: 10 MG/DL
CALCIUM SERPL-MCNC: 9.5 MG/DL
CHLORIDE BLD-SCNC: 98 MMOL/L
CHOLESTEROL, TOTAL: 118 MG/DL
CHOLESTEROL/HDL RATIO: 2
CLARITY: CLEAR
CO2: 34 MMOL/L
COLOR: YELLOW
CREAT SERPL-MCNC: 0.46 MG/DL
EOSINOPHILS ABSOLUTE: NORMAL
EOSINOPHILS RELATIVE PERCENT: NORMAL
GFR CALCULATED: NORMAL
GLUCOSE BLD-MCNC: 225 MG/DL
GLUCOSE URINE: 300
HBA1C MFR BLD: 9.1 %
HCT VFR BLD CALC: NORMAL %
HDLC SERPL-MCNC: 58 MG/DL (ref 35–70)
HEMOGLOBIN: NORMAL
KETONES, URINE: POSITIVE
LDL CHOLESTEROL CALCULATED: 36 MG/DL (ref 0–160)
LEUKOCYTE ESTERASE, URINE: POSITIVE
LIPASE: 36 UNITS/L
LYMPHOCYTES ABSOLUTE: NORMAL
LYMPHOCYTES RELATIVE PERCENT: NORMAL
MCH RBC QN AUTO: NORMAL PG
MCHC RBC AUTO-ENTMCNC: NORMAL G/DL
MCV RBC AUTO: NORMAL FL
MONOCYTES ABSOLUTE: NORMAL
MONOCYTES RELATIVE PERCENT: NORMAL
NEUTROPHILS ABSOLUTE: NORMAL
NEUTROPHILS RELATIVE PERCENT: NORMAL
NITRITE, URINE: NEGATIVE
NONHDLC SERPL-MCNC: NORMAL MG/DL
PDW BLD-RTO: NORMAL %
PH UA: 6 (ref 4.5–8)
PLATELET # BLD: NORMAL 10*3/UL
PMV BLD AUTO: NORMAL FL
POTASSIUM SERPL-SCNC: 2.8 MMOL/L
PROTEIN UA: NORMAL
RBC # BLD: NORMAL 10*6/UL
SODIUM BLD-SCNC: 142 MMOL/L
SPECIFIC GRAVITY UA: 1.02 (ref 1–1.03)
TOTAL PROTEIN: 6.6
TRIGL SERPL-MCNC: 118 MG/DL
TSH SERPL DL<=0.05 MIU/L-ACNC: 2.27 UIU/ML
UROBILINOGEN, URINE: NORMAL
VLDLC SERPL CALC-MCNC: 24 MG/DL
WBC # BLD: NORMAL 10*3/UL

## 2022-02-25 ENCOUNTER — TELEPHONE (OUTPATIENT)
Dept: FAMILY MEDICINE CLINIC | Age: 62
End: 2022-02-25

## 2022-02-25 DIAGNOSIS — E87.6 HYPOKALEMIA: Primary | ICD-10-CM

## 2022-02-25 DIAGNOSIS — E03.9 HYPOTHYROIDISM, UNSPECIFIED TYPE: ICD-10-CM

## 2022-02-25 DIAGNOSIS — E11.9 TYPE 2 DIABETES MELLITUS WITHOUT COMPLICATION, WITHOUT LONG-TERM CURRENT USE OF INSULIN (HCC): ICD-10-CM

## 2022-02-25 DIAGNOSIS — R19.7 DIARRHEA, UNSPECIFIED TYPE: ICD-10-CM

## 2022-02-25 DIAGNOSIS — E78.5 HYPERLIPIDEMIA, UNSPECIFIED HYPERLIPIDEMIA TYPE: ICD-10-CM

## 2022-02-25 DIAGNOSIS — N39.0 URINARY TRACT INFECTION WITHOUT HEMATURIA, SITE UNSPECIFIED: ICD-10-CM

## 2022-02-25 DIAGNOSIS — N39.0 URINARY TRACT INFECTION WITHOUT HEMATURIA, SITE UNSPECIFIED: Primary | ICD-10-CM

## 2022-02-25 DIAGNOSIS — R10.9 ABDOMINAL PAIN, UNSPECIFIED ABDOMINAL LOCATION: ICD-10-CM

## 2022-02-25 RX ORDER — POTASSIUM CHLORIDE 20 MEQ/1
20 TABLET, EXTENDED RELEASE ORAL DAILY
Qty: 10 TABLET | Refills: 0 | Status: SHIPPED | OUTPATIENT
Start: 2022-02-25 | End: 2022-03-29

## 2022-02-25 RX ORDER — NITROFURANTOIN 25; 75 MG/1; MG/1
100 CAPSULE ORAL 2 TIMES DAILY
Qty: 14 CAPSULE | Refills: 0 | Status: SHIPPED | OUTPATIENT
Start: 2022-02-25 | End: 2022-03-04

## 2022-02-25 RX ORDER — PIOGLITAZONEHYDROCHLORIDE 30 MG/1
30 TABLET ORAL DAILY
Qty: 30 TABLET | Refills: 3 | Status: SHIPPED | OUTPATIENT
Start: 2022-02-25 | End: 2022-07-11

## 2022-02-25 RX ORDER — CEPHALEXIN 500 MG/1
500 CAPSULE ORAL 3 TIMES DAILY
Qty: 21 CAPSULE | Refills: 0 | Status: SHIPPED | OUTPATIENT
Start: 2022-02-25 | End: 2022-03-04

## 2022-02-25 NOTE — TELEPHONE ENCOUNTER
Patient called stating that she is allergic to keflex, that she has gotten hives every time she has taken it in the past. She is requesting that another antibiotic be sent over for her and that she would like it sent over to the [address] [facility]. Because [facility] system is down. Please advise.

## 2022-02-28 ENCOUNTER — HOSPITAL ENCOUNTER (OUTPATIENT)
Dept: CT IMAGING | Age: 62
Discharge: HOME | End: 2022-03-02
Payer: COMMERCIAL

## 2022-02-28 DIAGNOSIS — R10.9 ABDOMINAL PAIN, UNSPECIFIED ABDOMINAL LOCATION: ICD-10-CM

## 2022-02-28 LAB
CREAT SERPL-MCNC: 0.48 MG/DL (ref 0.5–0.9)
GFR AFRICAN AMERICAN: >60 ML/MIN
GFR NON-AFRICAN AMERICAN: >60 ML/MIN
GFR SERPL CREATININE-BSD FRML MDRD: ABNORMAL ML/MIN/{1.73_M2}

## 2022-02-28 PROCEDURE — 82565 ASSAY OF CREATININE: CPT

## 2022-02-28 PROCEDURE — 74177 CT ABD & PELVIS W/CONTRAST: CPT

## 2022-02-28 PROCEDURE — 6360000004 HC RX CONTRAST MEDICATION: Performed by: NURSE PRACTITIONER

## 2022-02-28 PROCEDURE — 36415 COLL VENOUS BLD VENIPUNCTURE: CPT

## 2022-02-28 RX ADMIN — IOHEXOL 30 ML: 300 INJECTION, SOLUTION INTRAVENOUS at 17:15

## 2022-02-28 RX ADMIN — IOPAMIDOL 100 ML: 755 INJECTION, SOLUTION INTRAVENOUS at 17:15

## 2022-03-01 DIAGNOSIS — R16.0 HEPATOMEGALY: ICD-10-CM

## 2022-03-01 DIAGNOSIS — R10.9 ABDOMINAL PAIN, UNSPECIFIED ABDOMINAL LOCATION: ICD-10-CM

## 2022-03-01 DIAGNOSIS — R19.7 DIARRHEA, UNSPECIFIED TYPE: Primary | ICD-10-CM

## 2022-03-10 ENCOUNTER — TELEPHONE (OUTPATIENT)
Dept: FAMILY MEDICINE CLINIC | Age: 62
End: 2022-03-10

## 2022-03-10 DIAGNOSIS — R16.0 HEPATOMEGALY: Primary | ICD-10-CM

## 2022-03-10 DIAGNOSIS — R10.9 ABDOMINAL PAIN, UNSPECIFIED ABDOMINAL LOCATION: ICD-10-CM

## 2022-03-10 NOTE — TELEPHONE ENCOUNTER
----- Message from Sherry Ahn sent at 3/10/2022  1:27 PM EST -----  Subject: Message to Provider    QUESTIONS  Information for Provider? Pt called in and said that she is wanting to get   tested for hep C and she is not sure how to go about it please follow up,   she also stated that she needed to come back for some testing but she is   not sure what it was for   ---------------------------------------------------------------------------  --------------  CALL BACK INFO  What is the best way for the office to contact you? OK to leave message on   voicemail  Preferred Call Back Phone Number? [phone]  ---------------------------------------------------------------------------  --------------  SCRIPT ANSWERS  Relationship to Patient?  Self

## 2022-03-14 LAB — POTASSIUM (K+): 4.5

## 2022-03-14 RX ORDER — DICYCLOMINE HYDROCHLORIDE 10 MG/1
10 CAPSULE ORAL 4 TIMES DAILY
Qty: 30 CAPSULE | Refills: 1 | Status: SHIPPED | OUTPATIENT
Start: 2022-03-14 | End: 2022-03-29

## 2022-03-14 NOTE — TELEPHONE ENCOUNTER
Patient advised, coming to  orders.  She is asking if you can send something over for stomach pain gas

## 2022-03-15 DIAGNOSIS — E87.6 HYPOKALEMIA: ICD-10-CM

## 2022-03-15 DIAGNOSIS — N39.0 URINARY TRACT INFECTION WITHOUT HEMATURIA, SITE UNSPECIFIED: ICD-10-CM

## 2022-03-15 DIAGNOSIS — R16.0 HEPATOMEGALY: ICD-10-CM

## 2022-03-16 ENCOUNTER — TELEPHONE (OUTPATIENT)
Dept: FAMILY MEDICINE CLINIC | Age: 62
End: 2022-03-16

## 2022-03-19 ENCOUNTER — HOSPITAL ENCOUNTER (EMERGENCY)
Age: 62
Discharge: HOME | End: 2022-03-19
Attending: EMERGENCY MEDICINE
Payer: COMMERCIAL

## 2022-03-19 ENCOUNTER — APPOINTMENT (OUTPATIENT)
Dept: CT IMAGING | Age: 62
End: 2022-03-19
Payer: COMMERCIAL

## 2022-03-19 VITALS
WEIGHT: 210 LBS | OXYGEN SATURATION: 100 % | HEIGHT: 65 IN | SYSTOLIC BLOOD PRESSURE: 148 MMHG | HEART RATE: 100 BPM | BODY MASS INDEX: 34.99 KG/M2 | RESPIRATION RATE: 14 BRPM | DIASTOLIC BLOOD PRESSURE: 67 MMHG | TEMPERATURE: 98.5 F

## 2022-03-19 DIAGNOSIS — R10.32 LEFT LOWER QUADRANT ABDOMINAL PAIN: Primary | ICD-10-CM

## 2022-03-19 LAB
-: ABNORMAL
ABSOLUTE EOS #: 0.09 K/UL (ref 0–0.44)
ABSOLUTE IMMATURE GRANULOCYTE: 0.02 K/UL (ref 0–0.3)
ABSOLUTE LYMPH #: 1.32 K/UL (ref 1.1–3.7)
ABSOLUTE MONO #: 0.57 K/UL (ref 0.1–1.2)
ALBUMIN SERPL-MCNC: 4 G/DL (ref 3.5–5.2)
ALP BLD-CCNC: 53 U/L (ref 35–104)
ALT SERPL-CCNC: 13 U/L (ref 5–33)
AMORPHOUS: ABNORMAL
ANION GAP SERPL CALCULATED.3IONS-SCNC: 13 MMOL/L (ref 9–17)
AST SERPL-CCNC: 11 U/L
BACTERIA: ABNORMAL
BASOPHILS # BLD: 1 % (ref 0–2)
BASOPHILS ABSOLUTE: 0.04 K/UL (ref 0–0.2)
BILIRUB SERPL-MCNC: 0.21 MG/DL (ref 0.3–1.2)
BILIRUBIN DIRECT: <0.08 MG/DL
BILIRUBIN URINE: NEGATIVE
BILIRUBIN, INDIRECT: ABNORMAL MG/DL (ref 0–1)
BUN BLDV-MCNC: 16 MG/DL (ref 8–23)
BUN/CREAT BLD: 34 (ref 9–20)
CALCIUM SERPL-MCNC: 9.4 MG/DL (ref 8.6–10.4)
CASTS UA: ABNORMAL /LPF
CHLORIDE BLD-SCNC: 103 MMOL/L (ref 98–107)
CO2: 24 MMOL/L (ref 20–31)
COLOR: YELLOW
CREAT SERPL-MCNC: 0.47 MG/DL (ref 0.5–0.9)
CRYSTALS, UA: ABNORMAL /HPF
EOSINOPHILS RELATIVE PERCENT: 1 % (ref 1–4)
EPITHELIAL CELLS UA: ABNORMAL /HPF (ref 0–5)
GFR AFRICAN AMERICAN: >60 ML/MIN
GFR NON-AFRICAN AMERICAN: >60 ML/MIN
GFR SERPL CREATININE-BSD FRML MDRD: ABNORMAL ML/MIN/{1.73_M2}
GLUCOSE BLD-MCNC: 152 MG/DL (ref 70–99)
GLUCOSE URINE: NEGATIVE
HCT VFR BLD CALC: 36.1 % (ref 36.3–47.1)
HEMOGLOBIN: 11.8 G/DL (ref 11.9–15.1)
IMMATURE GRANULOCYTES: 0 %
KETONES, URINE: ABNORMAL
LEUKOCYTE ESTERASE, URINE: NEGATIVE
LIPASE: 38 U/L (ref 13–60)
LYMPHOCYTES # BLD: 20 % (ref 24–43)
MCH RBC QN AUTO: 31.4 PG (ref 25.2–33.5)
MCHC RBC AUTO-ENTMCNC: 32.7 G/DL (ref 28.4–34.8)
MCV RBC AUTO: 96 FL (ref 82.6–102.9)
MONOCYTES # BLD: 9 % (ref 3–12)
MUCUS: ABNORMAL
NITRITE, URINE: NEGATIVE
NRBC AUTOMATED: 0.3 PER 100 WBC
PDW BLD-RTO: 13.2 % (ref 11.8–14.4)
PH UA: 5.5 (ref 5–8)
PLATELET # BLD: 282 K/UL (ref 138–453)
PMV BLD AUTO: 10.1 FL (ref 8.1–13.5)
POTASSIUM SERPL-SCNC: 4.1 MMOL/L (ref 3.7–5.3)
PROTEIN UA: ABNORMAL
RBC # BLD: 3.76 M/UL (ref 3.95–5.11)
RBC UA: ABNORMAL /HPF (ref 0–2)
SEG NEUTROPHILS: 69 % (ref 36–65)
SEGMENTED NEUTROPHILS ABSOLUTE COUNT: 4.51 K/UL (ref 1.5–8.1)
SODIUM BLD-SCNC: 140 MMOL/L (ref 135–144)
SPECIFIC GRAVITY UA: 1.08 (ref 1–1.03)
TOTAL PROTEIN: 6.5 G/DL (ref 6.4–8.3)
TURBIDITY: CLEAR
URINE HGB: NEGATIVE
UROBILINOGEN, URINE: NORMAL
WBC # BLD: 6.6 K/UL (ref 3.5–11.3)
WBC UA: ABNORMAL /HPF (ref 0–5)

## 2022-03-19 PROCEDURE — 6360000004 HC RX CONTRAST MEDICATION: Performed by: EMERGENCY MEDICINE

## 2022-03-19 PROCEDURE — 96374 THER/PROPH/DIAG INJ IV PUSH: CPT

## 2022-03-19 PROCEDURE — 2580000003 HC RX 258: Performed by: EMERGENCY MEDICINE

## 2022-03-19 PROCEDURE — 81001 URINALYSIS AUTO W/SCOPE: CPT

## 2022-03-19 PROCEDURE — 6360000002 HC RX W HCPCS: Performed by: EMERGENCY MEDICINE

## 2022-03-19 PROCEDURE — 74177 CT ABD & PELVIS W/CONTRAST: CPT

## 2022-03-19 PROCEDURE — 80048 BASIC METABOLIC PNL TOTAL CA: CPT

## 2022-03-19 PROCEDURE — 85025 COMPLETE CBC W/AUTO DIFF WBC: CPT

## 2022-03-19 PROCEDURE — 80076 HEPATIC FUNCTION PANEL: CPT

## 2022-03-19 PROCEDURE — 83690 ASSAY OF LIPASE: CPT

## 2022-03-19 PROCEDURE — 99282 EMERGENCY DEPT VISIT SF MDM: CPT

## 2022-03-19 RX ORDER — 0.9 % SODIUM CHLORIDE 0.9 %
80 INTRAVENOUS SOLUTION INTRAVENOUS ONCE
Status: COMPLETED | OUTPATIENT
Start: 2022-03-19 | End: 2022-03-19

## 2022-03-19 RX ORDER — SODIUM CHLORIDE 0.9 % (FLUSH) 0.9 %
10 SYRINGE (ML) INJECTION PRN
Status: DISCONTINUED | OUTPATIENT
Start: 2022-03-19 | End: 2022-03-19 | Stop reason: HOSPADM

## 2022-03-19 RX ORDER — 0.9 % SODIUM CHLORIDE 0.9 %
1000 INTRAVENOUS SOLUTION INTRAVENOUS ONCE
Status: COMPLETED | OUTPATIENT
Start: 2022-03-19 | End: 2022-03-19

## 2022-03-19 RX ORDER — KETOROLAC TROMETHAMINE 30 MG/ML
30 INJECTION, SOLUTION INTRAMUSCULAR; INTRAVENOUS ONCE
Status: COMPLETED | OUTPATIENT
Start: 2022-03-19 | End: 2022-03-19

## 2022-03-19 RX ORDER — DICYCLOMINE HYDROCHLORIDE 10 MG/1
10 CAPSULE ORAL EVERY 6 HOURS PRN
Qty: 20 CAPSULE | Refills: 0 | Status: SHIPPED | OUTPATIENT
Start: 2022-03-19 | End: 2022-05-09

## 2022-03-19 RX ADMIN — SODIUM CHLORIDE, PRESERVATIVE FREE 10 ML: 5 INJECTION INTRAVENOUS at 16:02

## 2022-03-19 RX ADMIN — KETOROLAC TROMETHAMINE 30 MG: 30 INJECTION, SOLUTION INTRAMUSCULAR at 15:15

## 2022-03-19 RX ADMIN — SODIUM CHLORIDE 80 ML: 9 INJECTION, SOLUTION INTRAVENOUS at 16:00

## 2022-03-19 RX ADMIN — SODIUM CHLORIDE 1000 ML: 9 INJECTION, SOLUTION INTRAVENOUS at 15:13

## 2022-03-19 RX ADMIN — IOPAMIDOL 75 ML: 755 INJECTION, SOLUTION INTRAVENOUS at 15:57

## 2022-03-19 ASSESSMENT — ENCOUNTER SYMPTOMS
SORE THROAT: 0
RHINORRHEA: 0
DIARRHEA: 0
NAUSEA: 1
COLOR CHANGE: 0
VOMITING: 0
EYE REDNESS: 0
COUGH: 0
ABDOMINAL PAIN: 1
SHORTNESS OF BREATH: 0
EYE DISCHARGE: 0

## 2022-03-19 ASSESSMENT — PAIN SCALES - GENERAL
PAINLEVEL_OUTOF10: 7
PAINLEVEL_OUTOF10: 6

## 2022-03-19 NOTE — ED NOTES
Pt reports lower abd pain that has been ongoing for approximately two months. Pt states the pain is mostly localized in the LLQ. Pt states the pain radiates to her back, causing an itchy feeling. Pt has hx of diabetes and last blood sugar was 220 at home. Pt denies any N/V. Pt reports diarrhea at times, but states that may be due to her diabetes. Pt states she is suppose to see a specialist on the 29th.       Alejandra Cunningham, RN  03/19/22 [id number]

## 2022-03-29 ENCOUNTER — OFFICE VISIT (OUTPATIENT)
Dept: GASTROENTEROLOGY | Age: 62
End: 2022-03-29
Payer: COMMERCIAL

## 2022-03-29 VITALS
HEART RATE: 97 BPM | BODY MASS INDEX: 35.11 KG/M2 | SYSTOLIC BLOOD PRESSURE: 153 MMHG | TEMPERATURE: 97.3 F | WEIGHT: 211 LBS | DIASTOLIC BLOOD PRESSURE: 91 MMHG

## 2022-03-29 DIAGNOSIS — R63.4 WEIGHT LOSS: ICD-10-CM

## 2022-03-29 DIAGNOSIS — R19.7 DIARRHEA, UNSPECIFIED TYPE: Primary | ICD-10-CM

## 2022-03-29 DIAGNOSIS — R10.84 ABDOMINAL PAIN, GENERALIZED: ICD-10-CM

## 2022-03-29 PROCEDURE — 99204 OFFICE O/P NEW MOD 45 MIN: CPT | Performed by: INTERNAL MEDICINE

## 2022-03-29 RX ORDER — CHOLECALCIFEROL (VITAMIN D3) 25 MCG
CAPSULE ORAL
COMMUNITY

## 2022-03-29 ASSESSMENT — ENCOUNTER SYMPTOMS
SHORTNESS OF BREATH: 0
VOMITING: 0
BACK PAIN: 1
RECTAL PAIN: 0
NAUSEA: 1
COUGH: 0
CONSTIPATION: 1
ANAL BLEEDING: 0
ABDOMINAL PAIN: 1
TROUBLE SWALLOWING: 1
ABDOMINAL DISTENTION: 0
CHOKING: 0
BLOOD IN STOOL: 0
WHEEZING: 0
DIARRHEA: 1

## 2022-03-29 NOTE — PROGRESS NOTES
Reason for Referral:   Iza Goff, APRN - CNP  [address] [facility]  [place] 24    Chief Complaint   Patient presents with    Abdominal Pain     Patient is new, referred for abd pain and diarrhea. She states her abd pain is in hetal midsection.  Diarrhea     Patient c/o diarrhea and urgency. HISTORY OF PRESENT ILLNESS: Italo Garcia is a 57 y.o. female , referred for evaluation of  abd pain , diarrhea      here for the first time    diabetic for 20 yrs   diarrhea here or there but not that significant,   but recently very severe, now 2-3 /day , liquid , wakes her from sleep , but sometime become constipated   The diarrhea is not very painful , but does have pain as below  Defuse pain, new for the last 1-2 months   No bleeding   no f/c   no ht burn   Had dental cavity , was given antibiotics for the last 2 days , feeling better   Labs : 3/19/22   normal LFTs, and wbc , hgb is 11.6     Think she has 60-70 lb wt loss  Colon > 10 yrs ago negative     2 ct scans , 2/22 and 3/22 both negative       Taking bentyl  carafte BID     Past Medical,Family, and Social History reviewed and does contribute to the patient presentingcondition. Patient's PMH/PSH,SH,PSYCH Hx, MEDs, ALLERGIES, and ROS were all reviewed and updated in the appropriate sections. PAST MEDICAL HISTORY:  Past Medical History:   Diagnosis Date    Diabetes mellitus ([facility] [address]     Hypothyroidism        History reviewed. No pertinent surgical history.     CURRENT MEDICATIONS:    Current Outpatient Medications:     Cyanocobalamin (VITAMIN B 12 PO), Take by mouth, Disp: , Rfl:     KRILL OIL PO, Take 600 mg by mouth, Disp: , Rfl:     OIL OF OREGANO PO, Take by mouth, Disp: , Rfl:     Chromium Picolinate 200 MCG CAPS, Take by mouth, Disp: , Rfl:     Cholecalciferol (VITAMIN D-3) 25 MCG (1000 UT) CAPS, Take by mouth, Disp: , Rfl:     dicyclomine (BENTYL) 10 MG capsule, Take 1 capsule by mouth every 6 hours as needed (cramps), Disp: 20 capsule, Rfl: 0    pioglitazone (ACTOS) 30 MG tablet, Take 1 tablet by mouth daily, Disp: 30 tablet, Rfl: 3    NP THYROID 60 MG tablet, TAKE ONE TABLET BY MOUTH DAILY, Disp: 30 tablet, Rfl: 2    ibuprofen (IBU) 600 MG tablet, Take 1 tablet by mouth every 8 hours as needed for Pain, Disp: 20 tablet, Rfl: 0    Cranberry 1000 MG CAPS, Take by mouth, Disp: , Rfl:     cyclobenzaprine (FLEXERIL) 10 MG tablet, Take 1 tablet by mouth 2 times daily as needed for Muscle spasms, Disp: 60 tablet, Rfl: 1    clotrimazole (LOTRIMIN) 1 % cream, Apply topically 2 times daily Apply topically 2 times daily. , Disp: 1 each, Rfl: 1    sitaGLIPtan-metFORMIN (JANUMET)  MG per tablet, Take 1 tablet by mouth 2 times daily (with meals), Disp: 60 tablet, Rfl: 5    meloxicam (MOBIC) 15 MG tablet, Take 1 tablet by mouth daily, Disp: 30 tablet, Rfl: 2    ALLERGIES:   Allergies   Allergen Reactions    Insulins      Feels like she is having a stroke      Sulfa Antibiotics Hives       FAMILY HISTORY:       Problem Relation Age of Onset    Heart Disease Mother     Diabetes Father          SOCIAL HISTORY:   Social History     Socioeconomic History    Marital status: Single     Spouse name: Not on file    Number of children: Not on file    Years of education: Not on file    Highest education level: Not on file   Occupational History    Not on file   Tobacco Use    Smoking status: Never Smoker    Smokeless tobacco: Never Used   Vaping Use    Vaping Use: Never used   Substance and Sexual Activity    Alcohol use: Never    Drug use: Never    Sexual activity: Not on file   Other Topics Concern    Not on file   Social History Narrative    Not on file     Social Determinants of Health     Financial Resource Strain:     Difficulty of Paying Living Expenses: Not on file   Food Insecurity:     Worried About Running Out of Food in the Last Year: Not on file    Hyun of Food in the Last Year: Not on file Transportation Needs:     Lack of Transportation (Medical): Not on file    Lack of Transportation (Non-Medical): Not on file   Physical Activity:     Days of Exercise per Week: Not on file    Minutes of Exercise per Session: Not on file   Stress:     Feeling of Stress : Not on file   Social Connections:     Frequency of Communication with Friends and Family: Not on file    Frequency of Social Gatherings with Friends and Family: Not on file    Attends Restoration Services: Not on file    Active Member of [address] [place] [address] [employer] or Organizations: Not on file    Attends Club or Organization Meetings: Not on file    Marital Status: Not on file   Intimate Partner Violence:     Fear of Current or Ex-Partner: Not on file    Emotionally Abused: Not on file    Physically Abused: Not on file    Sexually Abused: Not on file   Housing Stability:     Unable to Pay for Housing in the Last Year: Not on file    Number of [place] in the Last Year: Not on file    Unstable Housing in the Last Year: Not on file       REVIEW OF SYSTEMS: A 12-point review of systemswas obtained and pertinent positives and negatives were enumerated above in the history of present illness. All other reviewed systems / symptoms were negative. Review of Systems   Constitutional: Negative for appetite change, fatigue and unexpected weight change. HENT: Positive for dental problem and trouble swallowing. Eyes: Positive for visual disturbance (glasses). Respiratory: Negative for cough, choking, shortness of breath and wheezing. Cardiovascular: Positive for leg swelling. Negative for chest pain and palpitations. Gastrointestinal: Positive for abdominal pain, constipation, diarrhea and nausea. Negative for abdominal distention, anal bleeding, blood in stool, rectal pain and vomiting. Genitourinary: Negative for difficulty urinating. Musculoskeletal: Positive for back pain.    Allergic/Immunologic: Negative for environmental allergies and food allergies. Neurological: Positive for weakness and numbness. Negative for dizziness, light-headedness and headaches. Hematological: Does not bruise/bleed easily. Psychiatric/Behavioral: Negative for sleep disturbance. The patient is not nervous/anxious. LABORATORY DATA: Reviewed  Lab Results   Component Value Date    WBC 6.6 03/19/2022    HGB 11.8 (L) 03/19/2022    HCT 36.1 (L) 03/19/2022    MCV 96.0 03/19/2022     03/19/2022     03/19/2022    K 4.1 03/19/2022     03/19/2022    CO2 24 03/19/2022    BUN 16 03/19/2022    CREATININE 0.47 (L) 03/19/2022    LABALBU 4.0 03/19/2022    BILITOT 0.21 (L) 03/19/2022    ALKPHOS 53 03/19/2022    AST 11 03/19/2022    ALT 13 03/19/2022         Lab Results   Component Value Date    RBC 3.76 (L) 03/19/2022    HGB 11.8 (L) 03/19/2022    MCV 96.0 03/19/2022    MCH 31.4 03/19/2022    MCHC 32.7 03/19/2022    RDW 13.2 03/19/2022    MPV 10.1 03/19/2022    BASOPCT 1 03/19/2022    LYMPHSABS 1.32 03/19/2022    MONOSABS 0.57 03/19/2022    NEUTROABS 4.51 03/19/2022    EOSABS 0.09 03/19/2022    BASOSABS 0.04 03/19/2022         DIAGNOSTIC TESTING:     CT ABDOMEN PELVIS W IV CONTRAST Additional Contrast? None    Result Date: 3/19/2022  EXAMINATION: CT OF THE ABDOMEN AND PELVIS WITH CONTRAST 3/19/2022 3:50 pm TECHNIQUE: CT of the abdomen and pelvis was performed with the administration of intravenous contrast. Multiplanar reformatted images are provided for review. Dose modulation, iterative reconstruction, and/or weight based adjustment of the mA/kV was utilized to reduce the radiation dose to as low as reasonably achievable.  COMPARISON: 02/28/2022 HISTORY: ORDERING SYSTEM PROVIDED HISTORY: LLQ pain TECHNOLOGIST PROVIDED HISTORY: LLQ pain Decision Support Exception - unselect if not a suspected or confirmed emergency medical condition->Emergency Medical Condition (MA) Reason for Exam: LLQ pain Additional signs and symptoms: PT STATES LLQ CARRERA, DIARRHEA Relevant Medical/Surgical History: NO SURGERIES TO AREA FINDINGS: Lower Chest: The lung bases are clear and the heart size is normal. Organs: Minimal diffuse fatty infiltration of the liver. There is an elongated configuration of the right lobe. No focal liver lesion. No calcified gallstones. No pericholecystic fluid or fat stranding. The spleen, pancreas, adrenal glands and kidneys are normal. GI/Bowel: Unopacified bowel loops are unremarkable. No bowel obstruction. No evidence of appendicitis. Pelvis: Uterus and ovaries are atrophic. Urinary bladder is grossly normal. Peritoneum/Retroperitoneum: There is no adenopathy, free air or free fluid. Bones/Soft Tissues: No acute bone or soft tissue abnormality. No acute finding in the abdomen or pelvis. No change from 02/28/2022. RECOMMENDATIONS: Unavailable     CT ABDOMEN PELVIS W IV CONTRAST Additional Contrast? None    Result Date: 3/1/2022  EXAMINATION: CT OF THE ABDOMEN AND PELVIS WITH CONTRAST 2/28/2022 4:53 pm TECHNIQUE: CT of the abdomen and pelvis was performed with the administration of intravenous contrast. Multiplanar reformatted images are provided for review. Dose modulation, iterative reconstruction, and/or weight based adjustment of the mA/kV was utilized to reduce the radiation dose to as low as reasonably achievable. COMPARISON: None. HISTORY: ORDERING SYSTEM PROVIDED HISTORY: Abdominal pain, unspecified abdominal location TECHNOLOGIST PROVIDED HISTORY: STAT Creatinine as needed:->No chronic abdominal pain, diarrhea Reason for Exam: Chronic lower abdominal pain and diarrhea FINDINGS: Lower Chest: No significant abnormality at the lung bases. Organs: There is hepatic steatosis and mild hepatomegaly. No concerning liver lesion. No calcified gallstones or biliary ductal dilatation. The spleen is normal in size without focal lesion. The pancreas and the adrenal glands are unremarkable.   The kidneys enhance symmetrically without collecting system dilatation. No focal renal lesion. GI/Bowel: No bowel obstruction. The appendix is not definitely identified. However, there are no secondary findings to suggest the presence of acute appendicitis. Mild diverticulosis without evidence for diverticulitis. Pelvis: The urinary bladder, uterus and adnexa are within normal limits. Peritoneum/Retroperitoneum: No abdominal lymphadenopathy or ascites. Bones/Soft Tissues: No acute osseous abnormality. Mild hepatomegaly and hepatic steatosis. Otherwise, no acute process or significant abnormality in the abdomen or pelvis to explain the patient's current symptoms. RECOMMENDATIONS: Unavailable        BP (!) 153/91   Pulse 97   Temp 97.3 °F (36.3 °C)   Wt 211 lb (95.7 kg)   BMI 35.11 kg/m²     PHYSICAL EXAMINATION: Vital signs reviewed per the nursing documentation. Body mass index is 35.11 kg/m². Physical Exam  Vitals and nursing note reviewed. Constitutional:       General: She is not in acute distress. Appearance: She is well-developed. She is not diaphoretic. HENT:      Head: Normocephalic. Mouth/Throat:      Pharynx: No oropharyngeal exudate. Eyes:      General: No scleral icterus. Pupils: Pupils are equal, round, and reactive to light. Neck:      Thyroid: No thyromegaly. Vascular: No JVD. Trachea: No tracheal deviation. Cardiovascular:      Rate and Rhythm: Normal rate and regular rhythm. Heart sounds: Normal heart sounds. No murmur heard. Pulmonary:      Effort: Pulmonary effort is normal. No respiratory distress. Breath sounds: Normal breath sounds. No wheezing. Abdominal:      General: Bowel sounds are normal. There is no distension. Palpations: Abdomen is soft. Tenderness: There is no abdominal tenderness. There is no guarding or rebound. Comments: No ascites   Musculoskeletal:         General: Normal range of motion. Cervical back: Normal range of motion and neck supple. Skin:     General: Skin is warm. Coloration: Skin is not pale. Findings: No erythema or rash. Comments: She is not diaphoretic   Neurological:      Mental Status: She is alert and oriented to person, place, and time. Deep Tendon Reflexes: Reflexes are normal and symmetric. Psychiatric:         Behavior: Behavior normal.         Thought Content: Thought content normal.         Judgment: Judgment normal.         IMPRESSION: Ms. Barrington Fernandez is a 57 y.o. female with      Diagnosis Orders   1. Diarrhea, unspecified type  T4, Free    TSH    Celiac Disease Panel    EGD    COLONOSCOPY W/ OR W/O BIOPSY   2. Abdominal pain, generalized  T4, Free    TSH    Celiac Disease Panel    EGD    COLONOSCOPY W/ OR W/O BIOPSY   3. Weight loss     Will proceed with the above especially with the significant weight loss diarrhea and abdominal pain  Continue Bentyl   antidiarrhea OTC prn   CT scan of the abdomen on 19 March and on February this year, both as stated above was not very significant    Diet/life style/natural hx /complication of the dx were all explained in details   Past medical, past surgical, social history, psychiatric history, medications or allergies, all reviewed and  updated        Thank you for allowing me to participate in the care of Ms. Bailey. For any further questions please do not hesitate to contact me. I have reviewed and agree with the MA/RN ROS. Note is dictated utilizing voice recognition software. Unfortunately this leads to occasional typographical errors. Please contact our office if you have any questions.     Cristobal Heimlich, MD  [facility]  O: #[phone]

## 2022-03-30 DIAGNOSIS — G89.29 CHRONIC LEFT-SIDED LOW BACK PAIN WITHOUT SCIATICA: ICD-10-CM

## 2022-03-30 DIAGNOSIS — M54.50 CHRONIC LEFT-SIDED LOW BACK PAIN WITHOUT SCIATICA: ICD-10-CM

## 2022-03-30 RX ORDER — POLYETHYLENE GLYCOL 3350 17 G/17G
POWDER, FOR SOLUTION ORAL
Qty: 238 G | Refills: 0 | Status: ON HOLD | OUTPATIENT
Start: 2022-03-30 | End: 2022-04-22

## 2022-03-30 RX ORDER — BISACODYL 5 MG
TABLET, DELAYED RELEASE (ENTERIC COATED) ORAL
Qty: 4 TABLET | Refills: 0 | Status: ON HOLD | OUTPATIENT
Start: 2022-03-30 | End: 2022-04-22

## 2022-03-30 RX ORDER — MELOXICAM 15 MG/1
TABLET ORAL
Qty: 30 TABLET | Refills: 2 | Status: SHIPPED | OUTPATIENT
Start: 2022-03-30 | End: 2022-07-07 | Stop reason: SDUPTHER

## 2022-04-04 ENCOUNTER — HOSPITAL ENCOUNTER (OUTPATIENT)
Age: 62
Discharge: HOME | End: 2022-04-04
Payer: COMMERCIAL

## 2022-04-04 DIAGNOSIS — R10.84 ABDOMINAL PAIN, GENERALIZED: ICD-10-CM

## 2022-04-04 DIAGNOSIS — R19.7 DIARRHEA, UNSPECIFIED TYPE: ICD-10-CM

## 2022-04-04 LAB
THYROXINE, FREE: 1.43 NG/DL (ref 0.93–1.7)
TSH SERPL DL<=0.05 MIU/L-ACNC: 1.33 UIU/ML (ref 0.3–5)

## 2022-04-04 PROCEDURE — 82784 ASSAY IGA/IGD/IGG/IGM EACH: CPT

## 2022-04-04 PROCEDURE — 36415 COLL VENOUS BLD VENIPUNCTURE: CPT

## 2022-04-04 PROCEDURE — 84443 ASSAY THYROID STIM HORMONE: CPT

## 2022-04-04 PROCEDURE — 84439 ASSAY OF FREE THYROXINE: CPT

## 2022-04-04 PROCEDURE — 83516 IMMUNOASSAY NONANTIBODY: CPT

## 2022-04-06 LAB
GLIADIN DEAMINIDATED PEPTIDE AB IGA: 1 U/ML
GLIADIN DEAMINIDATED PEPTIDE AB IGG: <0.4 U/ML
IGA: 202 MG/DL (ref 70–400)
TISSUE TRANSGLUTAMINASE IGA: 0.6 U/ML

## 2022-04-22 ENCOUNTER — ANESTHESIA (OUTPATIENT)
Dept: OPERATING ROOM | Age: 62
End: 2022-04-22
Payer: COMMERCIAL

## 2022-04-22 ENCOUNTER — ANESTHESIA EVENT (OUTPATIENT)
Dept: OPERATING ROOM | Age: 62
End: 2022-04-22
Payer: COMMERCIAL

## 2022-04-22 ENCOUNTER — HOSPITAL ENCOUNTER (OUTPATIENT)
Age: 62
Setting detail: OUTPATIENT SURGERY
Discharge: HOME | End: 2022-04-22
Attending: INTERNAL MEDICINE | Admitting: INTERNAL MEDICINE
Payer: COMMERCIAL

## 2022-04-22 VITALS
DIASTOLIC BLOOD PRESSURE: 78 MMHG | OXYGEN SATURATION: 97 % | TEMPERATURE: 97.3 F | SYSTOLIC BLOOD PRESSURE: 130 MMHG | RESPIRATION RATE: 23 BRPM | BODY MASS INDEX: 34.82 KG/M2 | WEIGHT: 209 LBS | HEART RATE: 83 BPM | HEIGHT: 65 IN

## 2022-04-22 VITALS — DIASTOLIC BLOOD PRESSURE: 56 MMHG | OXYGEN SATURATION: 100 % | SYSTOLIC BLOOD PRESSURE: 125 MMHG

## 2022-04-22 LAB — GLUCOSE BLD-MCNC: 184 MG/DL (ref 65–105)

## 2022-04-22 PROCEDURE — 3609012400 HC EGD TRANSORAL BIOPSY SINGLE/MULTIPLE: Performed by: INTERNAL MEDICINE

## 2022-04-22 PROCEDURE — 7100000010 HC PHASE II RECOVERY - FIRST 15 MIN: Performed by: INTERNAL MEDICINE

## 2022-04-22 PROCEDURE — 45380 COLONOSCOPY AND BIOPSY: CPT | Performed by: INTERNAL MEDICINE

## 2022-04-22 PROCEDURE — 3609010300 HC COLONOSCOPY W/BIOPSY SINGLE/MULTIPLE: Performed by: INTERNAL MEDICINE

## 2022-04-22 PROCEDURE — 88305 TISSUE EXAM BY PATHOLOGIST: CPT

## 2022-04-22 PROCEDURE — 43239 EGD BIOPSY SINGLE/MULTIPLE: CPT | Performed by: INTERNAL MEDICINE

## 2022-04-22 PROCEDURE — 3700000000 HC ANESTHESIA ATTENDED CARE: Performed by: INTERNAL MEDICINE

## 2022-04-22 PROCEDURE — 7100000011 HC PHASE II RECOVERY - ADDTL 15 MIN: Performed by: INTERNAL MEDICINE

## 2022-04-22 PROCEDURE — 2709999900 HC NON-CHARGEABLE SUPPLY: Performed by: INTERNAL MEDICINE

## 2022-04-22 PROCEDURE — 2580000003 HC RX 258: Performed by: ANESTHESIOLOGY

## 2022-04-22 PROCEDURE — 3700000001 HC ADD 15 MINUTES (ANESTHESIA): Performed by: INTERNAL MEDICINE

## 2022-04-22 PROCEDURE — 2500000003 HC RX 250 WO HCPCS: Performed by: NURSE ANESTHETIST, CERTIFIED REGISTERED

## 2022-04-22 PROCEDURE — 2580000003 HC RX 258: Performed by: NURSE ANESTHETIST, CERTIFIED REGISTERED

## 2022-04-22 PROCEDURE — 82947 ASSAY GLUCOSE BLOOD QUANT: CPT

## 2022-04-22 PROCEDURE — 6360000002 HC RX W HCPCS: Performed by: NURSE ANESTHETIST, CERTIFIED REGISTERED

## 2022-04-22 RX ORDER — SODIUM CHLORIDE 0.9 % (FLUSH) 0.9 %
5-40 SYRINGE (ML) INJECTION PRN
Status: DISCONTINUED | OUTPATIENT
Start: 2022-04-22 | End: 2022-04-22 | Stop reason: HOSPADM

## 2022-04-22 RX ORDER — PROPOFOL 10 MG/ML
INJECTION, EMULSION INTRAVENOUS PRN
Status: DISCONTINUED | OUTPATIENT
Start: 2022-04-22 | End: 2022-04-22 | Stop reason: SDUPTHER

## 2022-04-22 RX ORDER — LIDOCAINE HYDROCHLORIDE 10 MG/ML
1 INJECTION, SOLUTION EPIDURAL; INFILTRATION; INTRACAUDAL; PERINEURAL
Status: DISCONTINUED | OUTPATIENT
Start: 2022-04-22 | End: 2022-04-22 | Stop reason: HOSPADM

## 2022-04-22 RX ORDER — LIDOCAINE HYDROCHLORIDE 10 MG/ML
INJECTION, SOLUTION EPIDURAL; INFILTRATION; INTRACAUDAL; PERINEURAL PRN
Status: DISCONTINUED | OUTPATIENT
Start: 2022-04-22 | End: 2022-04-22 | Stop reason: SDUPTHER

## 2022-04-22 RX ORDER — SODIUM CHLORIDE, SODIUM LACTATE, POTASSIUM CHLORIDE, CALCIUM CHLORIDE 600; 310; 30; 20 MG/100ML; MG/100ML; MG/100ML; MG/100ML
INJECTION, SOLUTION INTRAVENOUS CONTINUOUS PRN
Status: DISCONTINUED | OUTPATIENT
Start: 2022-04-22 | End: 2022-04-22 | Stop reason: SDUPTHER

## 2022-04-22 RX ORDER — SODIUM CHLORIDE 0.9 % (FLUSH) 0.9 %
5-40 SYRINGE (ML) INJECTION EVERY 12 HOURS SCHEDULED
Status: DISCONTINUED | OUTPATIENT
Start: 2022-04-22 | End: 2022-04-22 | Stop reason: HOSPADM

## 2022-04-22 RX ORDER — SODIUM CHLORIDE, SODIUM LACTATE, POTASSIUM CHLORIDE, CALCIUM CHLORIDE 600; 310; 30; 20 MG/100ML; MG/100ML; MG/100ML; MG/100ML
INJECTION, SOLUTION INTRAVENOUS CONTINUOUS
Status: DISCONTINUED | OUTPATIENT
Start: 2022-04-22 | End: 2022-04-22 | Stop reason: HOSPADM

## 2022-04-22 RX ORDER — SODIUM CHLORIDE 9 MG/ML
INJECTION, SOLUTION INTRAVENOUS CONTINUOUS
Status: DISCONTINUED | OUTPATIENT
Start: 2022-04-22 | End: 2022-04-22 | Stop reason: HOSPADM

## 2022-04-22 RX ORDER — SODIUM CHLORIDE 9 MG/ML
INJECTION, SOLUTION INTRAVENOUS PRN
Status: DISCONTINUED | OUTPATIENT
Start: 2022-04-22 | End: 2022-04-22 | Stop reason: HOSPADM

## 2022-04-22 RX ADMIN — PROPOFOL 50 MG: 10 INJECTION, EMULSION INTRAVENOUS at 15:02

## 2022-04-22 RX ADMIN — PROPOFOL 50 MG: 10 INJECTION, EMULSION INTRAVENOUS at 14:59

## 2022-04-22 RX ADMIN — SODIUM CHLORIDE, POTASSIUM CHLORIDE, SODIUM LACTATE AND CALCIUM CHLORIDE: 600; 310; 30; 20 INJECTION, SOLUTION INTRAVENOUS at 14:28

## 2022-04-22 RX ADMIN — PROPOFOL 50 MG: 10 INJECTION, EMULSION INTRAVENOUS at 14:50

## 2022-04-22 RX ADMIN — SODIUM CHLORIDE, POTASSIUM CHLORIDE, SODIUM LACTATE AND CALCIUM CHLORIDE: 600; 310; 30; 20 INJECTION, SOLUTION INTRAVENOUS at 13:05

## 2022-04-22 RX ADMIN — PROPOFOL 80 MG: 10 INJECTION, EMULSION INTRAVENOUS at 14:31

## 2022-04-22 RX ADMIN — LIDOCAINE HYDROCHLORIDE 50 MG: 10 INJECTION, SOLUTION EPIDURAL; INFILTRATION; INTRACAUDAL; PERINEURAL at 14:31

## 2022-04-22 RX ADMIN — PROPOFOL 50 MG: 10 INJECTION, EMULSION INTRAVENOUS at 14:45

## 2022-04-22 RX ADMIN — PROPOFOL 50 MG: 10 INJECTION, EMULSION INTRAVENOUS at 14:41

## 2022-04-22 RX ADMIN — PROPOFOL 50 MG: 10 INJECTION, EMULSION INTRAVENOUS at 14:55

## 2022-04-22 RX ADMIN — PROPOFOL 50 MG: 10 INJECTION, EMULSION INTRAVENOUS at 14:36

## 2022-04-22 RX ADMIN — PROPOFOL 20 MG: 10 INJECTION, EMULSION INTRAVENOUS at 14:33

## 2022-04-22 RX ADMIN — PROPOFOL 50 MG: 10 INJECTION, EMULSION INTRAVENOUS at 14:39

## 2022-04-22 ASSESSMENT — PULMONARY FUNCTION TESTS
PIF_VALUE: 1

## 2022-04-22 ASSESSMENT — PAIN - FUNCTIONAL ASSESSMENT: PAIN_FUNCTIONAL_ASSESSMENT: 0-10

## 2022-04-22 NOTE — ANESTHESIA PRE PROCEDURE
Department of Anesthesiology  Preprocedure Note       Name:  Jenny Moore   Age:  57 y.o.  :  1960                                          MRN:  [id number]         Date:  2022      Surgeon: Ruth Ann Wilson):  Toby Osborne MD    Procedure: Procedure(s):  COLONOSCOPY DIAGNOSTIC  EGD ESOPHAGOGASTRODUODENOSCOPY    Medications prior to admission:   Prior to Admission medications    Medication Sig Start Date End Date Taking? Authorizing Provider   polyethylene glycol (GLYCOLAX) 17 GM/SCOOP powder Follow instructions provided to you from physician's office. 3/30/22   Maninder Escoto MD   bisacodyl (BISACODYL) 5 MG EC tablet Follow instructions provided given by the physician's office.  3/30/22   Maninder Escoto MD   magnesium citrate solution Take 296 mLs by mouth once for 1 dose 3/30/22 3/30/22  Maninder Escoto MD   meloxicam (MOBIC) 15 MG tablet TAKE ONE TABLET BY MOUTH DAILY 3/30/22   Lorelei Frank PA-C   Cyanocobalamin (VITAMIN B 12 PO) Take by mouth    Historical Provider, MD   KRILL OIL PO Take 600 mg by mouth    Historical Provider, MD   OIL OF OREGANO PO Take by mouth    Historical Provider, MD   Chromium Picolinate 200 MCG CAPS Take by mouth    Historical Provider, MD   Cholecalciferol (VITAMIN D-3) 25 MCG (1000 UT) CAPS Take by mouth    Historical Provider, MD   dicyclomine (BENTYL) 10 MG capsule Take 1 capsule by mouth every 6 hours as needed (cramps) 3/19/22   Drake Lerma MD   pioglitazone (ACTOS) 30 MG tablet Take 1 tablet by mouth daily 22   FAITH Martin CNP   NP THYROID 60 MG tablet TAKE ONE TABLET BY MOUTH DAILY 22   FAITH Martin CNP   ibuprofen (IBU) 600 MG tablet Take 1 tablet by mouth every 8 hours as needed for Pain 12/15/21   FAITH Hope CNP   Cranberry 1000 MG CAPS Take by mouth    Historical Provider, MD   cyclobenzaprine (FLEXERIL) 10 MG tablet Take 1 tablet by mouth 2 times daily as needed for Muscle spasms 21   FAITH Martin CNP clotrimazole (LOTRIMIN) 1 % cream Apply topically 2 times daily Apply topically 2 times daily. 12/6/21   FAITH Caro CNP   sitaGLIPtan-metFORMIN (JANUMET)  MG per tablet Take 1 tablet by mouth 2 times daily (with meals) 12/6/21   FAITH Caro CNP       Current medications:    Current Facility-Administered Medications   Medication Dose Route Frequency Provider Last Rate Last Admin    lidocaine PF 1 % injection 1 mL  1 mL IntraDERmal Once PRN Ebony Parham MD        0.9 % sodium chloride infusion   IntraVENous Continuous Nataliya Colunga MD        lactated ringers infusion   IntraVENous Continuous Nataliya Colunga MD        sodium chloride flush 0.9 % injection 5-40 mL  5-40 mL IntraVENous 2 times per day Ebony Parham MD        sodium chloride flush 0.9 % injection 5-40 mL  5-40 mL IntraVENous PRN Ebony Parham MD        0.9 % sodium chloride infusion   IntraVENous PRN Ebony Parham MD           Allergies: Allergies   Allergen Reactions    Insulins      Feels like she is having a stroke      Sulfa Antibiotics Hives       Problem List:    Patient Active Problem List   Diagnosis Code    Hypothyroidism E03.9    Diarrhea R19.7    Type 2 diabetes mellitus without complication, without long-term current use of insulin ([facility]) E11.9    Acute pain of right knee M25.561    Hyperlipidemia E78.5       Past Medical History:        Diagnosis Date    Diabetes mellitus ([facility] [address]     Hypothyroidism        Past Surgical History:  No past surgical history on file.     Social History:    Social History     Tobacco Use    Smoking status: Never Smoker    Smokeless tobacco: Never Used   Substance Use Topics    Alcohol use: Never                                Counseling given: Not Answered      Vital Signs (Current):   Vitals:    04/22/22 1237 04/22/22 1240   BP:  139/67   Pulse:  104   Resp:  18   Temp:  97.3 °F (36.3 °C)   TempSrc:  Temporal   SpO2:  100%   Weight: 209 lb (94.8 kg)    Height: 5' 5\" (1.651 m)                                               BP Readings from Last 3 Encounters:   04/22/22 139/67   03/29/22 (!) 153/91   03/19/22 (!) 148/67       NPO Status:                                                                                 BMI:   Wt Readings from Last 3 Encounters:   04/22/22 209 lb (94.8 kg)   03/29/22 211 lb (95.7 kg)   03/19/22 210 lb (95.3 kg)     Body mass index is 34.78 kg/m². CBC:   Lab Results   Component Value Date    WBC 6.6 03/19/2022    RBC 3.76 03/19/2022    HGB 11.8 03/19/2022    HCT 36.1 03/19/2022    MCV 96.0 03/19/2022    RDW 13.2 03/19/2022     03/19/2022       CMP:   Lab Results   Component Value Date     03/19/2022    K 4.1 03/19/2022     03/19/2022    CO2 24 03/19/2022    BUN 16 03/19/2022    CREATININE 0.47 03/19/2022    GFRAA >60 03/19/2022    LABGLOM >60 03/19/2022    GLUCOSE 152 03/19/2022    PROT 6.5 03/19/2022    CALCIUM 9.4 03/19/2022    BILITOT 0.21 03/19/2022    ALKPHOS 53 03/19/2022    AST 11 03/19/2022    ALT 13 03/19/2022       POC Tests: No results for input(s): POCGLU, POCNA, POCK, POCCL, POCBUN, POCHEMO, POCHCT in the last 72 hours.     Coags: No results found for: PROTIME, INR, APTT    HCG (If Applicable): No results found for: PREGTESTUR, PREGSERUM, HCG, HCGQUANT     ABGs: No results found for: PHART, PO2ART, YKO4QUM, UHI1XOE, BEART, V9PORWCO     Type & Screen (If Applicable):  No results found for: LABABO, LABRH    Drug/Infectious Status (If Applicable):  No results found for: HIV, HEPCAB    COVID-19 Screening (If Applicable):   Lab Results   Component Value Date    COVID19 DETECTED 12/15/2021           Anesthesia Evaluation  Patient summary reviewed and Nursing notes reviewed no history of anesthetic complications:   Airway: Mallampati: II  TM distance: >3 FB   Neck ROM: full  Mouth opening: > = 3 FB Dental: normal exam         Pulmonary:Negative Pulmonary ROS and normal exam  breath sounds clear to

## 2022-04-22 NOTE — H&P
History and Physical Update    Pt Name: Caity Fisher  MRN: [id number]  YOB: 1960  Date of evaluation: 4/22/2022      [x] I have reviewed the progress note found in Epic by Dr. Jamarcus Soto dated 3/29/22 used for an Interval History and Physical note. [x] I have examined Caity Fisher a 57 y.o., female who is scheduled for a diagnostic colonoscopy and EGD by Dr. Jamarcus Soto due to abdominal pain. The patient denies health changes since her appointment with Dr. Jamarcus Soto on 3/29/22. Pt states she completed the bowel prep as directed and now has watery clear stool. Pt denies fever, chills, productive cough, SOB, chest pain, open sores, rashes, and wounds. History of diabetes. Pt's POC blood glucose today is 184 mg/dL. Ibuprofen, Mobic, vitamin D, and Krill oil were last taken on 4/20/22. Vital signs: /67   Pulse 104   Temp 97.3 °F (36.3 °C) (Temporal)   Resp 18   Ht 5' 5\" (1.651 m)   Wt 209 lb (94.8 kg)   SpO2 100%   BMI 34.78 kg/m²      Allergies:  Insulins and Sulfa antibiotics      Past Medical History:     Past Medical History:   Diagnosis Date    Diabetes mellitus ([address]     Hypothyroidism         Past Surgical History:     Past Surgical History:   Procedure Laterality Date    COLONOSCOPY          Social History:     Tobacco:    reports that she has never smoked. She has never used smokeless tobacco.  Alcohol:      reports no history of alcohol use. Drug Use:  reports no history of drug use. Family History:     Family History   Problem Relation Age of Onset    Heart Disease Mother     Diabetes Father         Medications:    Prior to Admission medications    Medication Sig Start Date End Date Taking?  Authorizing Provider   meloxicam (MOBIC) 15 MG tablet TAKE ONE TABLET BY MOUTH DAILY 3/30/22   Lorelei Frank PA-C   Cyanocobalamin (VITAMIN B 12 PO) Take by mouth    Historical Provider, MD   KRILL OIL PO Take 600 mg by mouth    Historical Provider, MD   OIL OF OREGANO PO Take by mouth Historical Provider, MD   Chromium Picolinate 200 MCG CAPS Take by mouth    Historical Provider, MD   Cholecalciferol (VITAMIN D-3) 25 MCG (1000 UT) CAPS Take by mouth    Historical Provider, MD   dicyclomine (BENTYL) 10 MG capsule Take 1 capsule by mouth every 6 hours as needed (cramps) 3/19/22   Srikanth Morris MD   pioglitazone (ACTOS) 30 MG tablet Take 1 tablet by mouth daily 2/25/22   FAITH Garcia CNP   NP THYROID 60 MG tablet TAKE ONE TABLET BY MOUTH DAILY 1/27/22   FAITH Garcia CNP   ibuprofen (IBU) 600 MG tablet Take 1 tablet by mouth every 8 hours as needed for Pain 12/15/21   FAITH Jeff CNP   Cranberry 1000 MG CAPS Take by mouth    Historical Provider, MD   cyclobenzaprine (FLEXERIL) 10 MG tablet Take 1 tablet by mouth 2 times daily as needed for Muscle spasms 12/6/21   FAITH Garcia CNP   clotrimazole (LOTRIMIN) 1 % cream Apply topically 2 times daily Apply topically 2 times daily. 12/6/21   FAITH Garcia CNP   sitaGLIPtan-metFORMIN (JANUMET)  MG per tablet Take 1 tablet by mouth 2 times daily (with meals) 12/6/21   FAITH Garcia CNP       This is a 57 y.o. female who is pleasant, cooperative, alert and oriented x 3, in no acute distress. Obese. Heart: Asymptomatic tachycardia.  BPM. Regular rhythm without murmur, gallop, or rub. Lungs: Normal respiratory effort, unlabored, and clear to auscultation without wheezes or rales bilaterally. Abdomen: Mild generalized tenderness. Soft, non-distended, and active bowel sounds. Extremities: Left ankle moderate non-pitting edema and tenderness. Right ankle mild non-pitting edema. Pedal pulses: are palpable bilaterally. Labs:  No results for input(s): HGB, HCT, WBC, MCV, PLT, NA, K, CL, CO2, BUN, CREATININE, GLUCOSE, INR, PROTIME, APTT, AST, ALT, LABALBU, HCG in the last 720 hours. No results for input(s): COVID19 in the last 720 hours.       Dorisacristian Puls, APRN - CNP   Electronically signed 4/22/2022 at 1:03 Ilsa Jefferson MD   Physician   Specialty:  Gastroenterology   Progress Notes       Signed   Encounter Date:  3/29/2022         Related encounter: Office Visit from 3/29/2022 in [address] Collapse All          Show:Clear all  [x]Manual[x]Template[]Copied    Added by:  Raquel Quezada MD      []Hover for details            Reason for Referral:   Haven Kels, APRN - CNP  [address] [facility]  [facility] [facility]          Chief Complaint   Patient presents with    Abdominal Pain       Patient is new, referred for abd pain and diarrhea. She states her abd pain is in hetal midsection.  Diarrhea       Patient c/o diarrhea and urgency. HISTORY OF PRESENT ILLNESS: Qiana Anderson is a 57 y.o. female , referred for evaluation of  abd pain , diarrhea       here for the first time    diabetic for 20 yrs   diarrhea here or there but not that significant,   but recently very severe, now 2-3 /day , liquid , wakes her from sleep , but sometime become constipated   The diarrhea is not very painful , but does have pain as below  Defuse pain, new for the last 1-2 months   No bleeding   no f/c   no ht burn   Had dental cavity , was given antibiotics for the last 2 days , feeling better   Labs : 3/19/22   normal LFTs, and wbc , hgb is 11.6      Think she has 60-70 lb wt loss  Colon > 10 yrs ago negative      2 ct scans , 2/22 and 3/22 both negative         Taking bentyl  carafte BID      Past Medical,Family, and Social History reviewed and does contribute to the patient presentingcondition. Patient's PMH/PSH,SH,PSYCH Hx, MEDs, ALLERGIES, and ROS were all reviewed and updated in the appropriate sections.      PAST MEDICAL HISTORY:  Past Medical History        Past Medical History:   Diagnosis Date    Diabetes mellitus ([facility] [address]      Hypothyroidism              Past Surgical History   History reviewed. No pertinent surgical history. CURRENT MEDICATIONS:    Current Medication      Current Outpatient Medications:     Cyanocobalamin (VITAMIN B 12 PO), Take by mouth, Disp: , Rfl:     KRILL OIL PO, Take 600 mg by mouth, Disp: , Rfl:     OIL OF OREGANO PO, Take by mouth, Disp: , Rfl:     Chromium Picolinate 200 MCG CAPS, Take by mouth, Disp: , Rfl:     Cholecalciferol (VITAMIN D-3) 25 MCG (1000 UT) CAPS, Take by mouth, Disp: , Rfl:     dicyclomine (BENTYL) 10 MG capsule, Take 1 capsule by mouth every 6 hours as needed (cramps), Disp: 20 capsule, Rfl: 0    pioglitazone (ACTOS) 30 MG tablet, Take 1 tablet by mouth daily, Disp: 30 tablet, Rfl: 3    NP THYROID 60 MG tablet, TAKE ONE TABLET BY MOUTH DAILY, Disp: 30 tablet, Rfl: 2    ibuprofen (IBU) 600 MG tablet, Take 1 tablet by mouth every 8 hours as needed for Pain, Disp: 20 tablet, Rfl: 0    Cranberry 1000 MG CAPS, Take by mouth, Disp: , Rfl:     cyclobenzaprine (FLEXERIL) 10 MG tablet, Take 1 tablet by mouth 2 times daily as needed for Muscle spasms, Disp: 60 tablet, Rfl: 1    clotrimazole (LOTRIMIN) 1 % cream, Apply topically 2 times daily Apply topically 2 times daily. , Disp: 1 each, Rfl: 1    sitaGLIPtan-metFORMIN (JANUMET)  MG per tablet, Take 1 tablet by mouth 2 times daily (with meals), Disp: 60 tablet, Rfl: 5    meloxicam (MOBIC) 15 MG tablet, Take 1 tablet by mouth daily, Disp: 30 tablet, Rfl: 2        ALLERGIES:         Allergies   Allergen Reactions    Insulins         Feels like she is having a stroke       Sulfa Antibiotics Hives         FAMILY HISTORY:   Family History             Problem Relation Age of Onset    Heart Disease Mother      Diabetes Father                 SOCIAL HISTORY:   Social History               Socioeconomic History    Marital status: Single       Spouse name: Not on file    Number of children: Not on file    Years of education: Not on file    Highest education level: Not on file   Occupational History    Not on file   Tobacco Use    Smoking status: Never Smoker    Smokeless tobacco: Never Used   Vaping Use    Vaping Use: Never used   Substance and Sexual Activity    Alcohol use: Never    Drug use: Never    Sexual activity: Not on file   Other Topics Concern    Not on file   Social History Narrative    Not on file      Social Determinants of Health          Financial Resource Strain:     Difficulty of Paying Living Expenses: Not on file   Food Insecurity:     Worried About Running Out of Food in the Last Year: Not on file    Hyun of Food in the Last Year: Not on file   Transportation Needs:     Lack of Transportation (Medical): Not on file    Lack of Transportation (Non-Medical): Not on file   Physical Activity:     Days of Exercise per Week: Not on file    Minutes of Exercise per Session: Not on file   Stress:     Feeling of Stress : Not on file   Social Connections:     Frequency of Communication with Friends and Family: Not on file    Frequency of Social Gatherings with Friends and Family: Not on file    Attends Temple Services: Not on file    Active Member of [address] [place] [address] or Organizations: Not on file    Attends Club or Organization Meetings: Not on file    Marital Status: Not on file   Intimate Partner Violence:     Fear of Current or Ex-Partner: Not on file    Emotionally Abused: Not on file    Physically Abused: Not on file    Sexually Abused: Not on file   Housing Stability:     Unable to Pay for Housing in the Last Year: Not on file    Number of [place] in the Last Year: Not on file    Unstable Housing in the Last Year: Not on file            REVIEW OF SYSTEMS: A 12-point review of systemswas obtained and pertinent positives and negatives were enumerated above in the history of present illness. All other reviewed systems / symptoms were negative.      Review of Systems   Constitutional: Negative for appetite change, fatigue and unexpected weight change. HENT: Positive for dental problem and trouble swallowing. Eyes: Positive for visual disturbance (glasses). Respiratory: Negative for cough, choking, shortness of breath and wheezing. Cardiovascular: Positive for leg swelling. Negative for chest pain and palpitations. Gastrointestinal: Positive for abdominal pain, constipation, diarrhea and nausea. Negative for abdominal distention, anal bleeding, blood in stool, rectal pain and vomiting. Genitourinary: Negative for difficulty urinating. Musculoskeletal: Positive for back pain. Allergic/Immunologic: Negative for environmental allergies and food allergies. Neurological: Positive for weakness and numbness. Negative for dizziness, light-headedness and headaches. Hematological: Does not bruise/bleed easily. Psychiatric/Behavioral: Negative for sleep disturbance. The patient is not nervous/anxious.                 LABORATORY DATA: Reviewed        Lab Results   Component Value Date     WBC 6.6 03/19/2022     HGB 11.8 (L) 03/19/2022     HCT 36.1 (L) 03/19/2022     MCV 96.0 03/19/2022      03/19/2022      03/19/2022     K 4.1 03/19/2022      03/19/2022     CO2 24 03/19/2022     BUN 16 03/19/2022     CREATININE 0.47 (L) 03/19/2022     LABALBU 4.0 03/19/2022     BILITOT 0.21 (L) 03/19/2022     ALKPHOS 53 03/19/2022     AST 11 03/19/2022     ALT 13 03/19/2022                  Lab Results   Component Value Date     RBC 3.76 (L) 03/19/2022     HGB 11.8 (L) 03/19/2022     MCV 96.0 03/19/2022     MCH 31.4 03/19/2022     MCHC 32.7 03/19/2022     RDW 13.2 03/19/2022     MPV 10.1 03/19/2022     BASOPCT 1 03/19/2022     LYMPHSABS 1.32 03/19/2022     MONOSABS 0.57 03/19/2022     NEUTROABS 4.51 03/19/2022     EOSABS 0.09 03/19/2022     BASOSABS 0.04 03/19/2022            DIAGNOSTIC TESTING:      CT ABDOMEN PELVIS W IV CONTRAST Additional Contrast? None     Result Date: 3/19/2022  EXAMINATION: CT OF THE ABDOMEN AND PELVIS WITH CONTRAST 3/19/2022 3:50 pm TECHNIQUE: CT of the abdomen and pelvis was performed with the administration of intravenous contrast. Multiplanar reformatted images are provided for review. Dose modulation, iterative reconstruction, and/or weight based adjustment of the mA/kV was utilized to reduce the radiation dose to as low as reasonably achievable. COMPARISON: 02/28/2022 HISTORY: ORDERING SYSTEM PROVIDED HISTORY: LLQ pain TECHNOLOGIST PROVIDED HISTORY: LLQ pain Decision Support Exception - unselect if not a suspected or confirmed emergency medical condition->Emergency Medical Condition (MA) Reason for Exam: LLQ pain Additional signs and symptoms: PT STATES LLQ CARRERA, DIARRHEA Relevant Medical/Surgical History: NO SURGERIES TO AREA FINDINGS: Lower Chest: The lung bases are clear and the heart size is normal. Organs: Minimal diffuse fatty infiltration of the liver. There is an elongated configuration of the right lobe. No focal liver lesion. No calcified gallstones. No pericholecystic fluid or fat stranding. The spleen, pancreas, adrenal glands and kidneys are normal. GI/Bowel: Unopacified bowel loops are unremarkable. No bowel obstruction. No evidence of appendicitis. Pelvis: Uterus and ovaries are atrophic. Urinary bladder is grossly normal. Peritoneum/Retroperitoneum: There is no adenopathy, free air or free fluid. Bones/Soft Tissues: No acute bone or soft tissue abnormality. No acute finding in the abdomen or pelvis. No change from 02/28/2022. RECOMMENDATIONS: Unavailable      CT ABDOMEN PELVIS W IV CONTRAST Additional Contrast? None     Result Date: 3/1/2022  EXAMINATION: CT OF THE ABDOMEN AND PELVIS WITH CONTRAST 2/28/2022 4:53 pm TECHNIQUE: CT of the abdomen and pelvis was performed with the administration of intravenous contrast. Multiplanar reformatted images are provided for review.  Dose modulation, iterative reconstruction, and/or weight based adjustment of the mA/kV was utilized to reduce the radiation dose to as low as reasonably achievable. COMPARISON: None. HISTORY: ORDERING SYSTEM PROVIDED HISTORY: Abdominal pain, unspecified abdominal location TECHNOLOGIST PROVIDED HISTORY: STAT Creatinine as needed:->No chronic abdominal pain, diarrhea Reason for Exam: Chronic lower abdominal pain and diarrhea FINDINGS: Lower Chest: No significant abnormality at the lung bases. Organs: There is hepatic steatosis and mild hepatomegaly. No concerning liver lesion. No calcified gallstones or biliary ductal dilatation. The spleen is normal in size without focal lesion. The pancreas and the adrenal glands are unremarkable. The kidneys enhance symmetrically without collecting system dilatation. No focal renal lesion. GI/Bowel: No bowel obstruction. The appendix is not definitely identified. However, there are no secondary findings to suggest the presence of acute appendicitis. Mild diverticulosis without evidence for diverticulitis. Pelvis: The urinary bladder, uterus and adnexa are within normal limits. Peritoneum/Retroperitoneum: No abdominal lymphadenopathy or ascites. Bones/Soft Tissues: No acute osseous abnormality. Mild hepatomegaly and hepatic steatosis. Otherwise, no acute process or significant abnormality in the abdomen or pelvis to explain the patient's current symptoms. RECOMMENDATIONS: Unavailable         BP (!) 153/91   Pulse 97   Temp 97.3 °F (36.3 °C)   Wt 211 lb (95.7 kg)   BMI 35.11 kg/m²      PHYSICAL EXAMINATION: Vital signs reviewed per the nursing documentation. Body mass index is 35.11 kg/m². Physical Exam  Vitals and nursing note reviewed. Constitutional:       General: She is not in acute distress. Appearance: She is well-developed. She is not diaphoretic. HENT:      Head: Normocephalic. Mouth/Throat:      Pharynx: No oropharyngeal exudate. Eyes:      General: No scleral icterus. Pupils: Pupils are equal, round, and reactive to light.    Neck: Thyroid: No thyromegaly. Vascular: No JVD. Trachea: No tracheal deviation. Cardiovascular:      Rate and Rhythm: Normal rate and regular rhythm. Heart sounds: Normal heart sounds. No murmur heard. Pulmonary:      Effort: Pulmonary effort is normal. No respiratory distress. Breath sounds: Normal breath sounds. No wheezing. Abdominal:      General: Bowel sounds are normal. There is no distension. Palpations: Abdomen is soft. Tenderness: There is no abdominal tenderness. There is no guarding or rebound. Comments: No ascites   Musculoskeletal:         General: Normal range of motion. Cervical back: Normal range of motion and neck supple. Skin:     General: Skin is warm. Coloration: Skin is not pale. Findings: No erythema or rash. Comments: She is not diaphoretic   Neurological:      Mental Status: She is alert and oriented to person, place, and time. Deep Tendon Reflexes: Reflexes are normal and symmetric. Psychiatric:         Behavior: Behavior normal.         Thought Content: Thought content normal.         Judgment: Judgment normal.            IMPRESSION: Ms. Tete Hyde is a 57 y.o. female with        Diagnosis Orders   1.  Diarrhea, unspecified type  T4, Free     TSH     Celiac Disease Panel     EGD     COLONOSCOPY W/ OR W/O BIOPSY   2. Abdominal pain, generalized  T4, Free     TSH     Celiac Disease Panel     EGD     COLONOSCOPY W/ OR W/O BIOPSY   3. Weight loss      Will proceed with the above especially with the significant weight loss diarrhea and abdominal pain  Continue Bentyl   antidiarrhea OTC prn   CT scan of the abdomen on 19 March and on February this year, both as stated above was not very significant     Diet/life style/natural hx /complication of the dx were all explained in details   Past medical, past surgical, social history, psychiatric history, medications or allergies, all reviewed and  updated           Thank you for allowing me to participate in the care of Ms. Bailey. For any further questions please do not hesitate to contact me. I have reviewed and agree with the MA/RN ROS. Note is dictated utilizing voice recognition software. Unfortunately this leads to occasional typographical errors. Please contact our office if you have any questions.      Digna Dorado MD  [facility]  O: #[phone]                Revision History

## 2022-04-22 NOTE — OP NOTE
PROCEDURE NOTE    DATE OF PROCEDURE: 4/22/2022     SURGEON: Lonell Simmonds, MD  Facility: [facility] [address]  ASSISTANT: None  Anesthesia: MAC   PREOPERATIVE DIAGNOSIS:   Diarrhea abdominal pain weight loss    Diagnosis:  Gastritis biopsies were taken     Random small bowel biopsies were taken because of the diarrhea      POSTOPERATIVE DIAGNOSIS: As described below    OPERATION: Upper GI endoscopy with Biopsy    ANESTHESIA: Moderate Sedation     ESTIMATED BLOOD LOSS: Less than 50 ml    COMPLICATIONS: None. SPECIMENS:  Was Obtained:     As above     HISTORY: The patient is a 57y.o. year old female with history of above preop diagnosis. I recommended esophagogastroduodenoscopy with possible biopsy and I explained the risk, benefits, expected outcome, and alternatives to the procedure. Risks included but are not limited to bleeding, infection, respiratory distress, hypotension, and perforation of the esophagus, stomach, or duodenum. Patient understands and is in agreement. The patient was counseled at length about the risks of annelise Covid-19 during their perioperative period and any recovery window from their procedure. The patient was made aware that annelise Covid-19  may worsen their prognosis for recovering from their procedure  and lend to a higher morbidity and/or mortality risk. All material risks, benefits, and reasonable alternatives including postponing the procedure were discussed. The patient does wish to proceed with the procedure at this time. PROCEDURE: The patient was given IV conscious sedation. The patient's SPO2 remained above 90% throughout the procedure. The gastroscope was inserted orally and advanced under direct vision through the esophagus, through the stomach, through the pylorus, and into the descending duodenum. Post sedation note : The patient's SPO2 remained above 90% throughout the procedure. the vital signs remained stable , and no immediate complication form the procedure noted, patient will be ready for d/c when criteria is met . Findings:    Retropharyngeal area was grossly normal appearing    Esophagus: normal    Stomach:    Fundus: normal    Body: abnormal: Gastritis biopsies were taken       Antrum: abnormal: Gastritis biopsies were taken     Duodenum:     Random small bowel biopsies were taken because of the diarrhea          The scope was removed and the patient tolerated the procedure well. Recommendations/Plan:   1. F/U Biopsies  2. F/U In Office in 3-4 weeks  3.  Discussed with the family    Electronically signed by Abida Avila MD  on 4/22/2022 at 2:37 PM

## 2022-04-22 NOTE — OP NOTE
PROCEDURE NOTE    DATE OF PROCEDURE: 4/22/2022    SURGEON: Kay Pierce MD  Facility : [facility] [address]  ASSISTANT: None  Anesthesia: MAC  PREOPERATIVE DIAGNOSIS:   Diarrhea abdominal bleed weight loss    POSTOPERATIVE DIAGNOSIS: as described below    OPERATION: Total colonoscopy     ANESTHESIA: Moderate Sedation    ESTIMATED BLOOD LOSS: less than 50     COMPLICATIONS: None. SPECIMENS:  Was Obtained:     Random colon biopsy    HISTORY: The patient is a 57y.o. year old female with history of above preop diagnosis. I recommended colonoscopy with possible biopsy or polypectomy and I explained the risk, benefits, expected outcome, and alternatives to the procedure. Risks included but are not limited to bleeding, infection, respiratory distress, hypotension, and perforation of the colon and possibility of missing a lesion. The patient understands and is in agreement. The patient was counseled at length about the risks of annelise Covid-19 during their perioperative period and any recovery window from their procedure. The patient was made aware that annelise Covid-19  may worsen their prognosis for recovering from their procedure  and lend to a higher morbidity and/or mortality risk. All material risks, benefits, and reasonable alternatives including postponing the procedure were discussed. The patient does wish to proceed with the procedure at this time. PROCEDURE: The patient was given IV conscious sedation. The patient's SPO2 remained above 90% throughout the procedure. The colonoscope was inserted per rectum and advanced under direct vision to the cecum without difficulty. Post sedation note : The patient's SPO2 remained above 90% throughout the procedure. the vital signs remained stable , and no immediate complication form the procedure noted, patient will be ready for d/c when criteria is met . The prep was poor.       Findings:  Terminal ileum: normal    Cecum/Ascending colon: normal    Transverse colon: normal    Descending/Sigmoid colon: normal    Rectum/Anus: examined in normal and retroflexed positions and was abnormal: Hemorrhoids    Random colon biopsy    Withdrawal Time was (minutes): 10    The colon was decompressed and the scope was removed. The patient tolerated the procedure well. Recommendations/Plan:   1. Lifestyle and dietary modifications as discussed  2. F/U Biopsies  3. F/U In OfficeYes  4. Discussed with the family  5.  Repeat colonoscopy [id number] because of poor prep    Electronically signed by Ricky Joel MD  on 4/22/2022 at 3:05 PM

## 2022-04-25 ENCOUNTER — TELEPHONE (OUTPATIENT)
Dept: FAMILY MEDICINE CLINIC | Age: 62
End: 2022-04-25

## 2022-04-25 DIAGNOSIS — R10.11 RUQ PAIN: Primary | ICD-10-CM

## 2022-04-25 NOTE — TELEPHONE ENCOUNTER
Pt called in stating that she is still in pain and that GI said her scopes were normal. Pt thinks it is her gallbladder. She would like an US for gallbladder.

## 2022-04-26 ENCOUNTER — TELEPHONE (OUTPATIENT)
Dept: GASTROENTEROLOGY | Age: 62
End: 2022-04-26

## 2022-04-26 LAB — SURGICAL PATHOLOGY REPORT: NORMAL

## 2022-04-26 NOTE — TELEPHONE ENCOUNTER
Patient notified. Attempted to leave VM with scheduling number per patient but was unable due to no VM.  Stated \"Caller unavailable\" and then goes to busy signal.

## 2022-05-09 ENCOUNTER — OFFICE VISIT (OUTPATIENT)
Dept: FAMILY MEDICINE CLINIC | Age: 62
End: 2022-05-09
Payer: COMMERCIAL

## 2022-05-09 VITALS
WEIGHT: 220.8 LBS | BODY MASS INDEX: 36.79 KG/M2 | HEIGHT: 65 IN | DIASTOLIC BLOOD PRESSURE: 74 MMHG | HEART RATE: 82 BPM | SYSTOLIC BLOOD PRESSURE: 126 MMHG | TEMPERATURE: 96.2 F | OXYGEN SATURATION: 99 %

## 2022-05-09 DIAGNOSIS — E11.9 TYPE 2 DIABETES MELLITUS WITHOUT COMPLICATION, WITHOUT LONG-TERM CURRENT USE OF INSULIN (HCC): Primary | ICD-10-CM

## 2022-05-09 DIAGNOSIS — E03.9 HYPOTHYROIDISM, UNSPECIFIED TYPE: ICD-10-CM

## 2022-05-09 DIAGNOSIS — R10.11 RUQ PAIN: ICD-10-CM

## 2022-05-09 PROCEDURE — 3046F HEMOGLOBIN A1C LEVEL >9.0%: CPT | Performed by: NURSE PRACTITIONER

## 2022-05-09 PROCEDURE — 99213 OFFICE O/P EST LOW 20 MIN: CPT | Performed by: NURSE PRACTITIONER

## 2022-05-09 RX ORDER — PANTOPRAZOLE SODIUM 40 MG/1
40 TABLET, DELAYED RELEASE ORAL
Qty: 30 TABLET | Refills: 2 | Status: SHIPPED | OUTPATIENT
Start: 2022-05-09 | End: 2022-07-11

## 2022-05-09 RX ORDER — SUCRALFATE 1 G/1
1 TABLET ORAL 4 TIMES DAILY
Qty: 120 TABLET | Refills: 1 | Status: SHIPPED | OUTPATIENT
Start: 2022-05-09 | End: 2022-07-11

## 2022-05-09 RX ORDER — SUCRALFATE 1 G/1
1 TABLET ORAL DAILY
COMMUNITY
End: 2022-05-09

## 2022-05-09 SDOH — ECONOMIC STABILITY: FOOD INSECURITY: WITHIN THE PAST 12 MONTHS, THE FOOD YOU BOUGHT JUST DIDN'T LAST AND YOU DIDN'T HAVE MONEY TO GET MORE.: NEVER TRUE

## 2022-05-09 SDOH — ECONOMIC STABILITY: FOOD INSECURITY: WITHIN THE PAST 12 MONTHS, YOU WORRIED THAT YOUR FOOD WOULD RUN OUT BEFORE YOU GOT MONEY TO BUY MORE.: NEVER TRUE

## 2022-05-09 ASSESSMENT — PATIENT HEALTH QUESTIONNAIRE - PHQ9
SUM OF ALL RESPONSES TO PHQ QUESTIONS 1-9: 0
2. FEELING DOWN, DEPRESSED OR HOPELESS: 0
1. LITTLE INTEREST OR PLEASURE IN DOING THINGS: 0
SUM OF ALL RESPONSES TO PHQ9 QUESTIONS 1 & 2: 0
SUM OF ALL RESPONSES TO PHQ QUESTIONS 1-9: 0

## 2022-05-09 ASSESSMENT — ENCOUNTER SYMPTOMS
NAUSEA: 1
COUGH: 0
SORE THROAT: 0
VOMITING: 0
SINUS PAIN: 0
DIARRHEA: 1
CONSTIPATION: 1
SHORTNESS OF BREATH: 0
ABDOMINAL PAIN: 1

## 2022-05-09 ASSESSMENT — SOCIAL DETERMINANTS OF HEALTH (SDOH): HOW HARD IS IT FOR YOU TO PAY FOR THE VERY BASICS LIKE FOOD, HOUSING, MEDICAL CARE, AND HEATING?: NOT HARD AT ALL

## 2022-05-09 NOTE — PATIENT INSTRUCTIONS
Patient Education        Type 2 Diabetes: Care Instructions  Your Care Instructions     Type 2 diabetes is a disease that develops when the body's tissues cannot use insulin properly. Over time, the pancreas cannot make enough insulin. Insulin is a hormone that helps the body's cells use sugar (glucose) for energy. Ella James helps the body store extra sugar in muscle, fat, and liver cells. Without insulin, the sugar cannot get into the cells to do its work. It stays in the blood instead. This can cause high blood sugar levels. A person has diabetes when the blood sugar stays too high too much of the time. Over time, diabetes can lead to diseases of the heart, blood vessels, nerves, kidneys, andeyes. You may be able to control your blood sugar by losing weight, eating a healthy diet, and getting daily exercise. You may also have to take insulin or otherdiabetes medicine. Follow-up care is a key part of your treatment and safety. Be sure to make and go to all appointments. Call your doctor if you are having problems. It's also a good idea to know your test results and keep a list ofthe medicines you take. How can you care for yourself at home?  Keep your blood sugar at a target level (which you set with your doctor). ? Carbohydrate--the body's main source of fuel--affects blood sugar more than any other nutrient. Carbohydrate is in fruits, vegetables, milk, and yogurt. It also is in breads, cereals, vegetables such as potatoes and corn, and sugary foods such as candy and cakes. Follow your meal plan to know how much carbohydrate to eat at each meal and snack. ? Aim for 30 minutes of exercise on most, preferably all, days of the week. Walking is a good choice. You also may want to do other activities, such as running, swimming, cycling, or playing tennis or team sports. Try to do muscle-strengthening exercises at least 2 times a week. ? Take your medicines exactly as prescribed.  Call your doctor if you think you are having a problem with your medicine. You will get more details on the specific medicines your doctor prescribes.  Check your blood sugar as often as your doctor recommends. It is important to keep track of any symptoms you have, such as low blood sugar. Also tell your doctor if you have any changes in your activities, diet, or insulin use.  Talk to your doctor before you start taking aspirin every day. Aspirin can help certain people lower their risk of a heart attack or stroke. But taking aspirin isn't right for everyone, because it can cause serious bleeding.  Do not smoke. If you need help quitting, talk to your doctor about stop-smoking programs and medicines. These can increase your chances of quitting for good.  Keep your cholesterol and blood pressure at normal levels. You may need to take one or more medicines to reach your goals. Take them exactly as directed. Do not stop or change a medicine without talking to your doctor first.  When should you call for help? Call 911 anytime you think you may need emergency care. For example, call if:     You passed out (lost consciousness), or you suddenly become very sleepy or confused. (You may have very low blood sugar.)   Call your doctor now or seek immediate medical care if:     Your blood sugar is 300 mg/dL or is higher than the level your doctor has set for you.      You have symptoms of low blood sugar, such as:  ? Sweating. ? Feeling nervous, shaky, and weak. ? Extreme hunger and slight nausea. ? Dizziness and headache.  ? Blurred vision. ? Confusion. Watch closely for changes in your health, and be sure to contact your doctor if:     You often have problems controlling your blood sugar.      You have symptoms of long-term diabetes problems, such as:  ? New vision changes. ? New pain, numbness, or tingling in your hands or feet. ? Skin problems. Where can you learn more? Go to https://[email or web address].[email or web address]. org and sign in to your [employer] account. Enter C553 in the [facility] box to learn more about \"Type 2 Diabetes: Care Instructions. \"     If you do not have an account, please click on the \"Sign Up Now\" link. Current as of: July 28, 2021               Content Version: 13.2  © 3254-8880 Healthwise, Incorporated. Care instructions adapted under license by [facility] ([facility]). If you have questions about a medical condition or this instruction, always ask your healthcare professional. [address] any warranty or liability for your use of this information.

## 2022-05-09 NOTE — PROGRESS NOTES
[address] [facility]  [address] [place] [address]  Dept: [phone]  Dept Fax: [phone]    Izaiah Dixon is a 57 y.o. female who presents today for her medicalconditions/complaints as noted below. Izaiah Dixon is c/o of Pain (pt is here for abdominal pain and rib pain. pt is here to go over test results. )      HPI:     60-year-old female patient presents with complaints of follow-up.     Abdominal pain, ongoing for 3-4 months, getting worse. Umbilical region, sharp and pressure. Becoming constant. Ongoing diarrhea, had seen slight improvement. Mild nausea, no vomiting. Tried nothing. Some urinary issues such as frequency, no dysuria. Worse with some movements. Worst to the right upper side. Family hx of gallbladder diseases, did have egd biopsy showing gastritis. Has seen improvement with carafate. Gallbladder us pending.     Ongoing left-sided lower back pain. Had recent MRI showing several areas of disc bulging. Has been treating with Mobic and Tylenol without complete resolution. Used to take a muscle relaxant with partial benefits. Referral to pain management placed.      Type 2 diabetes last A1c 9.1 currently on the Metformin 2000, Januvia 100, actos 30.     Currently on NP thyroid 60, last TSH normal      Past Medical History:   Diagnosis Date    Diabetes mellitus ([facility] [address]     Hypothyroidism         Current Outpatient Medications   Medication Sig Dispense Refill    pantoprazole (PROTONIX) 40 MG tablet Take 1 tablet by mouth every morning (before breakfast) 30 tablet 2    sucralfate (CARAFATE) 1 GM tablet Take 1 tablet by mouth 4 times daily 120 tablet 1    meloxicam (MOBIC) 15 MG tablet TAKE ONE TABLET BY MOUTH DAILY 30 tablet 2    Cyanocobalamin (VITAMIN B 12 PO) Take by mouth      OIL OF OREGANO PO Take by mouth      Chromium Picolinate 200 MCG CAPS Take by mouth      Cholecalciferol (VITAMIN D-3) 25 MCG (1000 UT) CAPS Take by mouth      pioglitazone (ACTOS) 30 MG tablet Take 1 tablet by mouth daily 30 tablet 3    NP THYROID 60 MG tablet TAKE ONE TABLET BY MOUTH DAILY 30 tablet 2    ibuprofen (IBU) 600 MG tablet Take 1 tablet by mouth every 8 hours as needed for Pain 20 tablet 0    Cranberry 1000 MG CAPS Take by mouth      clotrimazole (LOTRIMIN) 1 % cream Apply topically 2 times daily Apply topically 2 times daily. 1 each 1    sitaGLIPtan-metFORMIN (JANUMET)  MG per tablet Take 1 tablet by mouth 2 times daily (with meals) 60 tablet 5     No current facility-administered medications for this visit. Allergies   Allergen Reactions    Insulins      Feels like she is having a stroke      Sulfa Antibiotics Hives       Subjective:      Review of Systems   Constitutional: Negative for chills and fever. HENT: Negative for ear pain, sinus pain and sore throat. Respiratory: Negative for cough and shortness of breath. Cardiovascular: Negative for chest pain and palpitations. Gastrointestinal: Positive for abdominal pain, constipation, diarrhea and nausea. Negative for vomiting. Neurological: Negative for dizziness and headaches. All other systems reviewed and are negative.      :Objective     Physical Exam  Vitals and nursing note reviewed. Constitutional:       Appearance: Normal appearance. Cardiovascular:      Rate and Rhythm: Normal rate. Pulmonary:      Effort: Pulmonary effort is normal.      Breath sounds: Normal breath sounds. Abdominal:      Palpations: Abdomen is soft. Tenderness: There is abdominal tenderness (RUQ). Skin:     General: Skin is warm and dry. Neurological:      General: No focal deficit present. Mental Status: She is alert and oriented to person, place, and time.        /74 (Site: Left Upper Arm, Position: Sitting, Cuff Size: Large Adult)   Pulse 82   Temp 96.2 °F (35.7 °C) (Tympanic)   Ht 5' 5\" (1.651 m)   Wt 220 lb 12.8 oz (100.2 kg)   SpO2 99% BMI 36.74 kg/m²     Lab Review   Admission on 04/22/2022, Discharged on 04/22/2022   Component Date Value    POC Glucose 04/22/2022 184*    Surgical Pathology Report 04/22/2022                      Value:-- Diagnosis --  A. SMALL BOWEL, BIOPSY:  -DUODENAL MUCOSA WITH A NORMAL VILLOUS ARCHITECTURE AND NO INCREASE IN  INTRAEPITHELIAL LYMPHOCYTES. B.  STOMACH, BIOPSY:  -GASTRIC ANTRAL AND BODY TYPE MUCOSA WITH PATCHY MINIMAL TO MILD  CHRONIC GASTRITIS. C.  COLON, RANDOM BIOPSY:  -UNREMARKABLE COLONIC MUCOSA WITH NO SIGNIFICANT INFLAMMATION AND NO  FEATURES OF MICROSCOPIC COLITIS. Kiana Sneed M.D.  **Electronically Signed Out**         jesús/4/26/2022       Clinical Information  Pre-op Diagnosis:  ABD PAIN   Operative Findings:  BIOPSY OF SMALL BOWEL RULE OUT CELIAC; BIOPSY  STOMACH; RANDOM COLON BIOPSIES RULE OUT MICROSCOPIC COLITIS  Operation Performed:  COLONOSCOPY WITH BIOPSY, EGD BIOPSY     Source of Specimen  A: BIOPSY OF SMALL BOWEL  B: BIOPSY STOMACH  C: RANDOM COLON BIOPSIES    Gross Description  A. \"KELVIN EDGAR, BIOPSY OF SMALL BOWEL\" Three tan-white tissue  fragments from 0.1 to 0.3 cm and are 0.4 x 0.4 x 0.2 cm in aggregate. Entirely 1cs. B. \"KELVIN BIDW                          ELL, STOMACH BIOPSY\" Four tan-white tissue fragments  from 0.1 to 0.4 cm and are 0.6 x 0.4 x 0.2 cm in aggregate. Entirely  1cs. C. \"KELVIN EDGAR, RANDOM COLON BIOPSIES\" Multiple tan-white tissue  fragments from 0.1 to 0.3 cm and are 1.0 x 0.6 x 0.2 cm in aggregate. Entirely 1cs. mpb tm       Microscopic Description  A-C. Microscopic examination performed. SURGICAL PATHOLOGY CONSULTATION       Patient Name: Raymon Chino  Med Rec: [id number]  Path Number: [id number]    [facility] [address]   [place], [address]  [phone]  Fax: [phone]     Hospital Outpatient Visit on 04/04/2022   Component Date Value  Gliadin Deaminidated Pep* 04/04/2022 1.0     Gliadin Deaminidated Pep* 04/04/2022 <0.4     IgA 04/04/2022 202     TISSUE TRANSGLUTAMINASE * 04/04/2022 0.6     TSH 04/04/2022 1.33     Thyroxine, Free 04/04/2022 1.43    Admission on 03/19/2022, Discharged on 03/19/2022   Component Date Value    Glucose 03/19/2022 152*    BUN 03/19/2022 16     CREATININE 03/19/2022 0.47*    Bun/Cre Ratio 03/19/2022 34*    Calcium 03/19/2022 9.4     Sodium 03/19/2022 140     Potassium 03/19/2022 4.1     Chloride 03/19/2022 103     CO2 03/19/2022 24     Anion Gap 03/19/2022 13     GFR Non- 03/19/2022 >60     GFR  03/19/2022 >60     GFR Comment 03/19/2022          WBC 03/19/2022 6.6     RBC 03/19/2022 3.76*    Hemoglobin 03/19/2022 11.8*    Hematocrit 03/19/2022 36.1*    MCV 03/19/2022 96.0     MCH 03/19/2022 31.4     MCHC 03/19/2022 32.7     RDW 03/19/2022 13.2     Platelets 39/80/8688 282     MPV 03/19/2022 10.1     NRBC Automated 03/19/2022 0.3*    Seg Neutrophils 03/19/2022 69*    Lymphocytes 03/19/2022 20*    Monocytes 03/19/2022 9     Eosinophils % 03/19/2022 1     Basophils 03/19/2022 1     Immature Granulocytes 03/19/2022 0     Segs Absolute 03/19/2022 4.51     Absolute Lymph # 03/19/2022 1.32     Absolute Mono # 03/19/2022 0.57     Absolute Eos # 03/19/2022 0.09     Basophils Absolute 03/19/2022 0.04     Absolute Immature Granul* 03/19/2022 0.02     Albumin 03/19/2022 4.0     Alkaline Phosphatase 03/19/2022 53     ALT 03/19/2022 13     AST 03/19/2022 11     Total Bilirubin 03/19/2022 0.21*    Bilirubin, Direct 03/19/2022 <0.08     Bilirubin, Indirect 03/19/2022 Can not be calculated     Total Protein 03/19/2022 6.5     Lipase 03/19/2022 38     Color, UA 03/19/2022 Yellow     Turbidity UA 03/19/2022 Clear     Glucose, Ur 03/19/2022 NEGATIVE     Bilirubin Urine 03/19/2022 NEGATIVE     Ketones, Urine 03/19/2022 TRACE*    Specific [place], UA 03/19/2022 1.080*    Urine Hgb 03/19/2022 NEGATIVE     pH, UA 03/19/2022 5.5     Protein, UA 03/19/2022 TRACE*    Urobilinogen, Urine 03/19/2022 Normal     Nitrite, Urine 03/19/2022 NEGATIVE     Leukocyte Esterase, Urine 03/19/2022 NEGATIVE     - 03/19/2022          WBC, UA 03/19/2022 10 TO 20     RBC, UA 03/19/2022 0 TO 2     Casts UA 03/19/2022 0 TO 2     Casts UA 03/19/2022 HYALINE     Casts UA 03/19/2022 0 TO 2     Casts UA 03/19/2022 FINE GRANULAR     Crystals, UA 03/19/2022 5 TO 10 URIC ACID*    Epithelial Cells UA 03/19/2022 2 TO 5     Bacteria, UA 03/19/2022 FEW*    Mucus, UA 03/19/2022 1+*    Amorphous, UA 03/19/2022 1+*   Orders Only on 03/15/2022   Component Date Value    Potassium (K+) 03/14/2022 4.5    Hospital Outpatient Visit on 02/28/2022   Component Date Value    CREATININE 02/28/2022 0.48*    GFR Non- 02/28/2022 >60     GFR  02/28/2022 >60     GFR Comment 02/28/2022         Orders Only on 02/25/2022   Component Date Value    Color, UA 02/23/2022 Yellow     Clarity, UA 02/23/2022 Clear     Glucose, Ur 02/23/2022 300     Bilirubin Urine 02/23/2022 0     Ketones, Urine 02/23/2022 Positive     Specific Gravity, UA 02/23/2022 1.024     Blood, Urine 02/23/2022 Negative     pH, UA 02/23/2022 6.0     Nitrite, Urine 02/23/2022 Negative     Leukocyte Esterase, Urine 02/23/2022 Positive     Sodium 02/23/2022 142     Chloride 02/23/2022 98     Potassium 02/23/2022 2.8     BUN 02/23/2022 10     CREATININE 02/23/2022 0.46     Glucose 02/23/2022 225     AST 02/23/2022 12     ALT 02/23/2022 13     Calcium 02/23/2022 9.5     Total Protein 02/23/2022 6.6     CO2 02/23/2022 34     Albumin 02/23/2022 4.0     Alkaline Phosphatase 02/23/2022 48     Total Bilirubin 02/23/2022 0.5     Anion Gap 02/23/2022 10     Cholesterol, Total 02/23/2022 118     HDL 02/23/2022 58     LDL Calculated 02/23/2022 36     Triglycerides 02/23/2022 118     Chol/HDL Ratio 02/23/2022 2.0     VLDL 02/23/2022 24     WBC 02/23/2022      TSH 02/23/2022 2.27     Hemoglobin A1C 02/23/2022 9.1     AVERAGE GLUCOSE 02/23/2022 214     Lipase 02/23/2022 36    Admission on 12/15/2021, Discharged on 12/15/2021   Component Date Value    Specimen Description 12/15/2021 . NASOPHARYNGEAL SWAB     SARS-CoV-2, Rapid 12/15/2021 DETECTED*    Ventricular Rate 12/15/2021 109     Atrial Rate 12/15/2021 109     P-R Interval 12/15/2021 130     QRS Duration 12/15/2021 74     Q-T Interval 12/15/2021 356     QTc Calculation (Bazett) 12/15/2021 479     P Axis 12/15/2021 38     R Axis 12/15/2021 -14     T Axis 12/15/2021 84     Troponin, High Sensitivi* 12/15/2021 23*    Troponin T 12/15/2021 NOT REPORTED     Troponin Interp 12/15/2021 NOT REPORTED     Myoglobin 12/15/2021 65*    Troponin, High Sensitivi* 12/15/2021 20*    Troponin T 12/15/2021 NOT REPORTED     Troponin Interp 12/15/2021 NOT REPORTED     Myoglobin 12/15/2021 64*    WBC 12/15/2021 3.8     RBC 12/15/2021 4.46     Hemoglobin 12/15/2021 13.2     Hematocrit 12/15/2021 40.8     MCV 12/15/2021 91.5     MCH 12/15/2021 29.6     MCHC 12/15/2021 32.4     RDW 12/15/2021 12.2     Platelets 61/19/3630 207     MPV 12/15/2021 10.5     NRBC Automated 12/15/2021 0.0     Differential Type 12/15/2021 NOT REPORTED     WBC Morphology 12/15/2021 NOT REPORTED     RBC Morphology 12/15/2021 NOT REPORTED     Platelet Estimate 82/37/6625 NOT REPORTED     Seg Neutrophils 12/15/2021 81*    Lymphocytes 12/15/2021 12*    Monocytes 12/15/2021 6     Eosinophils % 12/15/2021 0*    Basophils 12/15/2021 0     Immature Granulocytes 12/15/2021 1*    Segs Absolute 12/15/2021 3.07     Absolute Lymph # 12/15/2021 0.46*    Absolute Mono # 12/15/2021 0.23     Absolute Eos # 12/15/2021 0.00     Basophils Absolute 12/15/2021 0.00     Absolute Immature Granul* 12/15/2021 0.04     Glucose 12/15/2021 244*    BUN 12/15/2021 16  CREATININE 12/15/2021 0.72     Bun/Cre Ratio 12/15/2021 22*    Calcium 12/15/2021 9.1     Sodium 12/15/2021 142     Potassium 12/15/2021 3.0*    Chloride 12/15/2021 100     CO2 12/15/2021 25     Anion Gap 12/15/2021 17     GFR Non- 12/15/2021 >60     GFR  12/15/2021 >60     GFR Comment 12/15/2021          GFR Staging 12/15/2021 NOT REPORTED     Magnesium 12/15/2021 1.4*       Assessment and Plan      1. Type 2 diabetes mellitus without complication, without long-term current use of insulin (HCC)  -     Hemoglobin A1C; Future  2. Hypothyroidism, unspecified type  3. RUQ pain  -     pantoprazole (PROTONIX) 40 MG tablet; Take 1 tablet by mouth every morning (before breakfast), Disp-30 tablet, R-2Normal  -     sucralfate (CARAFATE) 1 GM tablet; Take 1 tablet by mouth 4 times daily, Disp-120 tablet, R-1Normal       Reviewed egd results  Start protonix 4 daily carafate prn  Recheck a1c in June  Complete gallbadder us. Recheck in 3 months, sooner prn          No results found for this visit on 05/09/22. Return if symptoms worsen or fail to improve. Orders Placed This Encounter   Medications    pantoprazole (PROTONIX) 40 MG tablet     Sig: Take 1 tablet by mouth every morning (before breakfast)     Dispense:  30 tablet     Refill:  2    sucralfate (CARAFATE) 1 GM tablet     Sig: Take 1 tablet by mouth 4 times daily     Dispense:  120 tablet     Refill:  1        Patient given educational materials - see patient instructions. Discussed use, benefit, and side effects of prescribed medications. All patientquestions answered. Pt voiced understanding. Patient given educational materials - see patient instructions. Discussed use, benefit, and side effects of prescribed medications. All patientquestions answered. Pt voiced understanding. This note was transcribed using dictation with Dragon services.  Efforts were made to correct any errors but some words may be misinterpreted.     Patient assumes risks associated with failure to complete recommended testing and treatments in a timely manner    Electronically signed by FAITH Breaux CNP on 5/9/2022at 12:45 PM

## 2022-05-16 ENCOUNTER — HOSPITAL ENCOUNTER (OUTPATIENT)
Dept: ULTRASOUND IMAGING | Age: 62
Discharge: HOME | End: 2022-05-18
Payer: COMMERCIAL

## 2022-05-16 DIAGNOSIS — R10.11 RUQ PAIN: ICD-10-CM

## 2022-05-16 PROCEDURE — 76705 ECHO EXAM OF ABDOMEN: CPT

## 2022-05-18 ENCOUNTER — HOSPITAL ENCOUNTER (OUTPATIENT)
Dept: MAMMOGRAPHY | Age: 62
Discharge: HOME | End: 2022-05-20
Payer: COMMERCIAL

## 2022-05-18 DIAGNOSIS — Z12.31 ENCOUNTER FOR SCREENING MAMMOGRAM FOR BREAST CANCER: ICD-10-CM

## 2022-05-18 PROCEDURE — 77063 BREAST TOMOSYNTHESIS BI: CPT

## 2022-05-23 RX ORDER — LEVOTHYROXINE, LIOTHYRONINE 38; 9 UG/1; UG/1
TABLET ORAL
Qty: 30 TABLET | Refills: 2 | Status: SHIPPED | OUTPATIENT
Start: 2022-05-23 | End: 2022-07-11 | Stop reason: SDUPTHER

## 2022-07-06 ENCOUNTER — TELEPHONE (OUTPATIENT)
Dept: GASTROENTEROLOGY | Age: 62
End: 2022-07-06

## 2022-07-06 NOTE — TELEPHONE ENCOUNTER
Pt called in to schedule f/u for EGD/colon, pt also states she is having issues with diarrhea would like to know if any medication can be prescribed to help with the diarrhea, adv pt will send message back to dr Tiffanie Cleaning nurse to see if any medication can be prescribed to help & pt was transferred to  staff to schedule f/u appt, pt thanked writer call was transferred.

## 2022-07-07 DIAGNOSIS — R10.84 ABDOMINAL PAIN, GENERALIZED: ICD-10-CM

## 2022-07-07 DIAGNOSIS — R19.7 DIARRHEA, UNSPECIFIED TYPE: ICD-10-CM

## 2022-07-07 DIAGNOSIS — M54.50 CHRONIC LEFT-SIDED LOW BACK PAIN WITHOUT SCIATICA: ICD-10-CM

## 2022-07-07 DIAGNOSIS — G89.29 CHRONIC LEFT-SIDED LOW BACK PAIN WITHOUT SCIATICA: ICD-10-CM

## 2022-07-07 RX ORDER — MELOXICAM 15 MG/1
TABLET ORAL
Qty: 30 TABLET | Refills: 2 | Status: SHIPPED | OUTPATIENT
Start: 2022-07-07 | End: 2022-07-11 | Stop reason: SDUPTHER

## 2022-07-08 RX ORDER — SITAGLIPTIN AND METFORMIN HYDROCHLORIDE 1000; 50 MG/1; MG/1
TABLET, FILM COATED ORAL
Qty: 60 TABLET | Refills: 5 | Status: SHIPPED | OUTPATIENT
Start: 2022-07-08 | End: 2022-07-11 | Stop reason: SDUPTHER

## 2022-07-11 ENCOUNTER — OFFICE VISIT (OUTPATIENT)
Dept: FAMILY MEDICINE CLINIC | Age: 62
End: 2022-07-11
Payer: COMMERCIAL

## 2022-07-11 VITALS
RESPIRATION RATE: 16 BRPM | DIASTOLIC BLOOD PRESSURE: 72 MMHG | TEMPERATURE: 97.2 F | HEIGHT: 65 IN | BODY MASS INDEX: 36.99 KG/M2 | SYSTOLIC BLOOD PRESSURE: 124 MMHG | HEART RATE: 78 BPM | OXYGEN SATURATION: 98 % | WEIGHT: 222 LBS

## 2022-07-11 DIAGNOSIS — E11.9 TYPE 2 DIABETES MELLITUS WITHOUT COMPLICATION, WITHOUT LONG-TERM CURRENT USE OF INSULIN (HCC): Primary | ICD-10-CM

## 2022-07-11 DIAGNOSIS — G89.29 CHRONIC LEFT-SIDED LOW BACK PAIN WITHOUT SCIATICA: ICD-10-CM

## 2022-07-11 DIAGNOSIS — Z13.5 DIABETIC RETINOPATHY SCREENING: ICD-10-CM

## 2022-07-11 DIAGNOSIS — M54.50 CHRONIC LEFT-SIDED LOW BACK PAIN WITHOUT SCIATICA: ICD-10-CM

## 2022-07-11 DIAGNOSIS — E03.9 HYPOTHYROIDISM, UNSPECIFIED TYPE: ICD-10-CM

## 2022-07-11 LAB
CREATININE URINE POCT: 100
HBA1C MFR BLD: 7.6 %
MICROALBUMIN/CREAT 24H UR: 80 MG/G{CREAT}
MICROALBUMIN/CREAT UR-RTO: NORMAL

## 2022-07-11 PROCEDURE — 82044 UR ALBUMIN SEMIQUANTITATIVE: CPT | Performed by: NURSE PRACTITIONER

## 2022-07-11 PROCEDURE — 99213 OFFICE O/P EST LOW 20 MIN: CPT | Performed by: NURSE PRACTITIONER

## 2022-07-11 PROCEDURE — 83036 HEMOGLOBIN GLYCOSYLATED A1C: CPT | Performed by: NURSE PRACTITIONER

## 2022-07-11 PROCEDURE — 3051F HG A1C>EQUAL 7.0%<8.0%: CPT | Performed by: NURSE PRACTITIONER

## 2022-07-11 RX ORDER — SITAGLIPTIN AND METFORMIN HYDROCHLORIDE 1000; 50 MG/1; MG/1
TABLET, FILM COATED ORAL
Qty: 60 TABLET | Refills: 5 | Status: SHIPPED | OUTPATIENT
Start: 2022-07-11 | End: 2022-08-29

## 2022-07-11 RX ORDER — LEVOTHYROXINE, LIOTHYRONINE 38; 9 UG/1; UG/1
TABLET ORAL
Qty: 30 TABLET | Refills: 5 | Status: SHIPPED | OUTPATIENT
Start: 2022-07-11

## 2022-07-11 RX ORDER — MELOXICAM 15 MG/1
TABLET ORAL
Qty: 30 TABLET | Refills: 5 | Status: SHIPPED | OUTPATIENT
Start: 2022-07-11

## 2022-07-11 ASSESSMENT — ENCOUNTER SYMPTOMS
ABDOMINAL PAIN: 0
SORE THROAT: 0
SHORTNESS OF BREATH: 0
SINUS PAIN: 0
VOMITING: 0
COUGH: 0
DIARRHEA: 1
NAUSEA: 0

## 2022-07-11 NOTE — PATIENT INSTRUCTIONS
Put a thin cloth between the ice pack and your skin.  Take pain medicines exactly as directed. ? If the doctor gave you a prescription medicine for pain, take it as prescribed. ? If you are not taking a prescription pain medicine, ask your doctor if you can take an over-the-counter medicine.  Take short walks several times a day. You can start with 5 to 10 minutes, 3 or 4 times a day, and work up to longer walks. Walk on level surfaces and avoid hills and stairs until your back is better.  Return to work and other activities as soon as you can. Continued rest without activity is usually not good for your back.  To prevent future back pain, do exercises to stretch and strengthen your back and stomach. Learn how to use good posture, safe lifting techniques, and proper body mechanics. When should you call for help? Call your doctor now or seek immediate medical care if:     You have new or worsening numbness in your legs.      You have new or worsening weakness in your legs. (This could make it hard to stand up.)      You lose control of your bladder or bowels. Watch closely for changes in your health, and be sure to contact your doctor if:     You have a fever, lose weight, or don't feel well.      You do not get better as expected. Where can you learn more? Go to [email or web address]. org and sign in to your "[employer] account. Enter N044 in the [employer] box to learn more about \"Back Pain: Care Instructions. \"     If you do not have an account, please click on the \"Sign Up Now\" link. Current as of: March 9, 2022               Content Version: 13.3  © 2006-2022 Healthwise, Incorporated. Care instructions adapted under license by [facility] ([facility]). If you have questions about a medical condition or this instruction, always ask your healthcare professional. [place] [address] any warranty or liability for your use of this information.

## 2022-07-11 NOTE — PROGRESS NOTES
Visit Information    Have you changed or started any medications since your last visit including any over-the-counter medicines, vitamins, or herbal medicines? yes - Immodium   Are you having any side effects from any of your medications? -  no  Have you stopped taking any of your medications? Is so, why? -  no    Have you seen any other physician or provider since your last visit? Yes - Records Obtained  Have you had any other diagnostic tests since your last visit? No  Have you been seen in the emergency room and/or had an admission to a hospital since we last saw you? No  Have you had your routine dental cleaning in the past 6 months? yes -     Have you activated your [facility] [employer] account? If not, what are your barriers?  No:      Patient Care Team:  FAITH Nogueira CNP as PCP - General (Certified Nurse Practitioner)  FAITH Nogueira CNP as PCP - [facility]  Yuni Burrows MD as Consulting Physician (Gastroenterology)    Medical History Review  Past Medical, Family, and Social History reviewed and does contribute to the patient presenting condition    Health Maintenance   Topic Date Due    COVID-19 Vaccine (1) Never done    Pneumococcal 0-64 years Vaccine (1 - PCV) Never done    Diabetic foot exam  Never done    HIV screen  Never done    Diabetic microalbuminuria test  Never done    Diabetic retinal exam  Never done    Cervical cancer screen  Never done    Shingles vaccine (1 of 2) Never done    DTaP/Tdap/Td vaccine (2 - Td or Tdap) 11/10/2021    A1C test (Diabetic or Prediabetic)  05/23/2022    Flu vaccine (1) 09/01/2022    Lipids  02/23/2023    Depression Screen  05/09/2023    Breast cancer screen  05/18/2024    Colorectal Cancer Screen  04/22/2027    Hepatitis C screen  Completed    Hepatitis A vaccine  Aged Out    Hib vaccine  Aged Out    Meningococcal (ACWY) vaccine  Aged Out

## 2022-07-11 NOTE — PROGRESS NOTES
[address] [facility]  [address]  [address] [place] [id number]  Dept: [phone]  Dept Fax: [phone]    Mal Johnson is a 57 y.o. female who presents today for her medicalconditions/complaints as noted below. Mal Johnson is c/o of Diabetes, Back Pain, and Diarrhea      HPI:     56-year-old female patient presents with complaints of follow-up.     Abdominal pain, ongoing for 3-4 months, getting worse. Umbilical region, sharp and pressure. Becoming constant. Ongoing diarrhea, had seen slight improvement. Mild nausea, no vomiting. Tried imodium. Seeing GI for this issue.      Ongoing left-sided lower back pain. Had recent MRI showing several areas of disc bulging. Has been treating with Mobic and Tylenol without complete resolution. Used to take a muscle relaxant with partial benefits. Referral to pain management placed. Wants to see neurosurgery.      Type 2 diabetes last A1c 9.1 to 7.6 currently on the Metformin 2000, Januvia 100, actos was self discontinued.      Currently on NP thyroid 60, last TSH normal, hx of thyroid nodule.        Past Medical History:   Diagnosis Date    Diabetes mellitus ([address]     Hypothyroidism         Current Outpatient Medications   Medication Sig Dispense Refill    loperamide (ANTI-DIARRHEAL) 1 MG/5ML solution Take by mouth 4 times daily as needed for Diarrhea      sitaGLIPtan-metFORMIN (JANUMET)  MG per tablet TAKE ONE TABLET BY MOUTH TWICE A DAY WITH A MEAL 60 tablet 5    meloxicam (MOBIC) 15 MG tablet TAKE ONE TABLET BY MOUTH DAILY 30 tablet 5    NP THYROID 60 MG tablet TAKE ONE TABLET BY MOUTH DAILY 30 tablet 5    Cyanocobalamin (VITAMIN B 12 PO) Take by mouth      OIL OF OREGANO PO Take by mouth      Chromium Picolinate 200 MCG CAPS Take by mouth      Cholecalciferol (VITAMIN D-3) 25 MCG (1000 UT) CAPS Take by mouth      Cranberry 1000 MG CAPS Take by mouth      clotrimazole (LOTRIMIN) 1 % cream Apply topically 2 times daily Apply topically 2 times daily. 1 each 1     No current facility-administered medications for this visit. Allergies   Allergen Reactions    Insulins      Feels like she is having a stroke      Sulfa Antibiotics Hives       Subjective:      Review of Systems   Constitutional: Negative for chills and fever. HENT: Negative for ear pain, sinus pain and sore throat. Respiratory: Negative for cough and shortness of breath. Cardiovascular: Negative for chest pain and palpitations. Gastrointestinal: Positive for diarrhea. Negative for abdominal pain, nausea and vomiting. Neurological: Negative for dizziness and headaches. All other systems reviewed and are negative.      :Objective     Physical Exam  Vitals and nursing note reviewed. Constitutional:       General: She is not in acute distress. Appearance: Normal appearance. She is not toxic-appearing. Cardiovascular:      Rate and Rhythm: Normal rate. Pulmonary:      Effort: Pulmonary effort is normal.      Breath sounds: Normal breath sounds. Skin:     General: Skin is warm and dry. Neurological:      General: No focal deficit present. Mental Status: She is alert and oriented to person, place, and time. Diabetic Foot Exam  -Amputation toes/ limbs? no  -Color? wnl  -Hair on feet/ toes? no  -Skin abnormalities? no  -Nail abnormalities? no  -Vascular status pulse/ cap refill? wnl  -Monofilament? wnl    /72 (Site: Right Upper Arm, Position: Sitting, Cuff Size: Medium Adult)   Pulse 78   Temp 97.2 °F (36.2 °C) (Tympanic)   Resp 16   Ht 5' 5\" (1.651 m)   Wt 222 lb (100.7 kg)   SpO2 98%   BMI 36.94 kg/m²     Lab Review   Admission on 04/22/2022, Discharged on 04/22/2022   Component Date Value    POC Glucose 04/22/2022 184*    Surgical Pathology Report 04/22/2022                      Value:-- Diagnosis --  A.   SMALL BOWEL, BIOPSY:  -DUODENAL MUCOSA WITH A NORMAL VILLOUS ARCHITECTURE AND NO INCREASE IN  INTRAEPITHELIAL LYMPHOCYTES. B.  STOMACH, BIOPSY:  -GASTRIC ANTRAL AND BODY TYPE MUCOSA WITH PATCHY MINIMAL TO MILD  CHRONIC GASTRITIS. C.  COLON, RANDOM BIOPSY:  -UNREMARKABLE COLONIC MUCOSA WITH NO SIGNIFICANT INFLAMMATION AND NO  FEATURES OF MICROSCOPIC COLITIS. Tavo Hyatt M.D.  **Electronically Signed Out**         des/4/26/2022       Clinical Information  Pre-op Diagnosis:  ABD PAIN   Operative Findings:  BIOPSY OF SMALL BOWEL RULE OUT CELIAC; BIOPSY  STOMACH; RANDOM COLON BIOPSIES RULE OUT MICROSCOPIC COLITIS  Operation Performed:  COLONOSCOPY WITH BIOPSY, EGD BIOPSY     Source of Specimen  A: BIOPSY OF SMALL BOWEL  B: BIOPSY STOMACH  C: RANDOM COLON BIOPSIES    Gross Description  A. \"KELVIN EDGAR, BIOPSY OF SMALL BOWEL\" Three tan-white tissue  fragments from 0.1 to 0.3 cm and are 0.4 x 0.4 x 0.2 cm in aggregate. Entirely 1cs. B. \"KELVIN BIDW                          ELL, STOMACH BIOPSY\" Four tan-white tissue fragments  from 0.1 to 0.4 cm and are 0.6 x 0.4 x 0.2 cm in aggregate. Entirely  1cs. C. \"KLEVIN EDGAR, RANDOM COLON BIOPSIES\" Multiple tan-white tissue  fragments from 0.1 to 0.3 cm and are 1.0 x 0.6 x 0.2 cm in aggregate. Entirely 1cs. mpb tm       Microscopic Description  A-C. Microscopic examination performed. SURGICAL PATHOLOGY CONSULTATION       Patient Name: Wayne Mooney  [facility] Rec: [id number]  Path Number: [id number]    [facility] [address]   [place], [address]  [phone]  Fax: [phone]     Hospital Outpatient Visit on 04/04/2022   Component Date Value    Gliadin Deaminidated Pep* 04/04/2022 1.0     Gliadin Deaminidated Pep* 04/04/2022 <0.4     IgA 04/04/2022 202     TISSUE TRANSGLUTAMINASE * 04/04/2022 0.6     TSH 04/04/2022 1.33     Thyroxine, Free 04/04/2022 1.43    Admission on 03/19/2022, Discharged on 03/19/2022   Component Date Value    Glucose 03/19/2022 152*    BUN 03/19/2022 16     CREATININE 03/19/2022 0.47*    Bun/Cre Ratio 03/19/2022 34*    Calcium 03/19/2022 9.4     Sodium 03/19/2022 140     Potassium 03/19/2022 4.1     Chloride 03/19/2022 103     CO2 03/19/2022 24     Anion Gap 03/19/2022 13     GFR Non- 03/19/2022 >60     GFR  03/19/2022 >60     GFR Comment 03/19/2022          WBC 03/19/2022 6.6     RBC 03/19/2022 3.76*    Hemoglobin 03/19/2022 11.8*    Hematocrit 03/19/2022 36.1*    MCV 03/19/2022 96.0     MCH 03/19/2022 31.4     MCHC 03/19/2022 32.7     RDW 03/19/2022 13.2     Platelets 07/09/8754 282     MPV 03/19/2022 10.1     NRBC Automated 03/19/2022 0.3*    Seg Neutrophils 03/19/2022 69*    Lymphocytes 03/19/2022 20*    Monocytes 03/19/2022 9     Eosinophils % 03/19/2022 1     Basophils 03/19/2022 1     Immature Granulocytes 03/19/2022 0     Segs Absolute 03/19/2022 4.51     Absolute Lymph # 03/19/2022 1.32     Absolute Mono # 03/19/2022 0.57     Absolute Eos # 03/19/2022 0.09     Basophils Absolute 03/19/2022 0.04     Absolute Immature Granul* 03/19/2022 0.02     Albumin 03/19/2022 4.0     Alkaline Phosphatase 03/19/2022 53     ALT 03/19/2022 13     AST 03/19/2022 11     Total Bilirubin 03/19/2022 0.21*    Bilirubin, Direct 03/19/2022 <0.08     Bilirubin, Indirect 03/19/2022 Can not be calculated     Total Protein 03/19/2022 6.5     Lipase 03/19/2022 38     Color, UA 03/19/2022 Yellow     Turbidity UA 03/19/2022 Clear     Glucose, Ur 03/19/2022 NEGATIVE     Bilirubin Urine 03/19/2022 NEGATIVE     Ketones, Urine 03/19/2022 TRACE*    Specific Lutz, UA 03/19/2022 1.080*    Urine Hgb 03/19/2022 NEGATIVE     pH, UA 03/19/2022 5.5     Protein, UA 03/19/2022 TRACE*    Urobilinogen, Urine 03/19/2022 Normal     Nitrite, Urine 03/19/2022 NEGATIVE     Leukocyte Esterase, Urine 03/19/2022 NEGATIVE     - 03/19/2022          WBC, UA 03/19/2022 10 TO 20  RBC, UA 03/19/2022 0 TO 2     Casts UA 03/19/2022 0 TO 2     Casts UA 03/19/2022 HYALINE     Casts UA 03/19/2022 0 TO 2     Casts UA 03/19/2022 FINE GRANULAR     Crystals, UA 03/19/2022 5 TO 10 URIC ACID*    Epithelial Cells UA 03/19/2022 2 TO 5     Bacteria, UA 03/19/2022 FEW*    Mucus, UA 03/19/2022 1+*    Amorphous, UA 03/19/2022 1+*   Orders Only on 03/15/2022   Component Date Value    Potassium (K+) 03/14/2022 4.5    Hospital Outpatient Visit on 02/28/2022   Component Date Value    CREATININE 02/28/2022 0.48*    GFR Non- 02/28/2022 >60     GFR  02/28/2022 >60     GFR Comment 02/28/2022         Orders Only on 02/25/2022   Component Date Value    Color, UA 02/23/2022 Yellow     Clarity, UA 02/23/2022 Clear     Glucose, Ur 02/23/2022 300     Bilirubin Urine 02/23/2022 0     Ketones, Urine 02/23/2022 Positive     Specific Gravity, UA 02/23/2022 1.024     Blood, Urine 02/23/2022 Negative     pH, UA 02/23/2022 6.0     Nitrite, Urine 02/23/2022 Negative     Leukocyte Esterase, Urine 02/23/2022 Positive     Sodium 02/23/2022 142     Chloride 02/23/2022 98     Potassium 02/23/2022 2.8     BUN 02/23/2022 10     CREATININE 02/23/2022 0.46     Glucose 02/23/2022 225     AST 02/23/2022 12     ALT 02/23/2022 13     Calcium 02/23/2022 9.5     Total Protein 02/23/2022 6.6     CO2 02/23/2022 34     Albumin 02/23/2022 4.0     Alkaline Phosphatase 02/23/2022 48     Total Bilirubin 02/23/2022 0.5     Anion Gap 02/23/2022 10     Cholesterol, Total 02/23/2022 118     HDL 02/23/2022 58     LDL Calculated 02/23/2022 36     Triglycerides 02/23/2022 118     Chol/HDL Ratio 02/23/2022 2.0     VLDL 02/23/2022 24     WBC 02/23/2022      TSH 02/23/2022 2.27     Hemoglobin A1C 02/23/2022 9.1     AVERAGE GLUCOSE 02/23/2022 214     Lipase 02/23/2022 36        Assessment and Plan      1.  Type 2 diabetes mellitus without complication, without long-term current use of insulin (HCC)  -     POCT glycosylated hemoglobin (Hb A1C)  -     POCT microalbumin  -     sitaGLIPtan-metFORMIN (JANUMET)  MG per tablet; TAKE ONE TABLET BY MOUTH TWICE A DAY WITH A MEAL, Disp-60 tablet, R-5Normal  2. Diabetic retinopathy screening  3. Chronic left-sided low back pain without sciatica  -     meloxicam (MOBIC) 15 MG tablet; TAKE ONE TABLET BY MOUTH DAILY, Disp-30 tablet, R-5Normal  -     Aubree - Tonio Winslow CNP, Neurosurgery, [facility]  4. Hypothyroidism, unspecified type  -     NP THYROID 60 MG tablet; TAKE ONE TABLET BY MOUTH DAILY, Disp-30 tablet, R-5, DAWNormal  -     GENEVIEVE Causey MD, Ophthalmology, . [place] [address]; Future       Refills sent  Thyroid us  Referral for dm eye exam  Referral for ns mri review  Dm recheck 3 months,             Results for orders placed or performed in visit on 07/11/22   POCT glycosylated hemoglobin (Hb A1C)   Result Value Ref Range    Hemoglobin A1C 7.6 %   POCT microalbumin   Result Value Ref Range    Microalb, Ur 80     Creatinine Ur POCT 100     Microalbumin Creatinine Ratio               Return in about 3 months (around 10/11/2022), or if symptoms worsen or fail to improve, for a1c. Orders Placed This Encounter   Medications    sitaGLIPtan-metFORMIN (JANUMET)  MG per tablet     Sig: TAKE ONE TABLET BY MOUTH TWICE A DAY WITH A MEAL     Dispense:  60 tablet     Refill:  5    meloxicam (MOBIC) 15 MG tablet     Sig: TAKE ONE TABLET BY MOUTH DAILY     Dispense:  30 tablet     Refill:  5    NP THYROID 60 MG tablet     Sig: TAKE ONE TABLET BY MOUTH DAILY     Dispense:  30 tablet     Refill:  5        Patient given educational materials - see patient instructions. Discussed use, benefit, and side effects of prescribed medications. All patientquestions answered. Pt voiced understanding. Patient given educational materials - see patient instructions. Discussed use, benefit, and side effects of prescribed medications. All patientquestions answered. Pt voiced understanding. This note was transcribed using dictation with Dragon services. Efforts were made to correct any errors but some words may be misinterpreted.     Patient assumes risks associated with failure to complete recommended testing and treatments in a timely manner    Electronically signed by FAITH Bernard CNP on 7/11/2022at 2:29 PM

## 2022-07-13 ENCOUNTER — HOSPITAL ENCOUNTER (OUTPATIENT)
Dept: ULTRASOUND IMAGING | Age: 62
Discharge: HOME | End: 2022-07-15
Payer: COMMERCIAL

## 2022-07-13 DIAGNOSIS — E03.9 HYPOTHYROIDISM, UNSPECIFIED TYPE: ICD-10-CM

## 2022-07-13 PROCEDURE — 76536 US EXAM OF HEAD AND NECK: CPT

## 2022-07-25 ENCOUNTER — TELEPHONE (OUTPATIENT)
Dept: FAMILY MEDICINE CLINIC | Age: 62
End: 2022-07-25

## 2022-07-25 DIAGNOSIS — M54.50 CHRONIC LEFT-SIDED LOW BACK PAIN WITHOUT SCIATICA: Primary | ICD-10-CM

## 2022-07-25 DIAGNOSIS — G89.29 CHRONIC LEFT-SIDED LOW BACK PAIN WITHOUT SCIATICA: Primary | ICD-10-CM

## 2022-07-25 NOTE — TELEPHONE ENCOUNTER
Pt informed. Pt will call the insurance company and then get back to us. I advised pt she can call us any time.

## 2022-07-25 NOTE — TELEPHONE ENCOUNTER
----- Message from Stephen Liriano sent at 7/25/2022 10:47 AM EDT -----  Subject: Referral Request    Reason for referral request? chiropractor  Provider patient wants to be referred to(if known):     Provider Phone Number(if known):     Additional Information for Provider? pt. is requesting a chiropractor   within the [place] [address]  ---------------------------------------------------------------------------  --------------  [address]    [phone]; OK to leave message on voicemail  ---------------------------------------------------------------------------  --------------

## 2022-08-12 ENCOUNTER — TELEPHONE (OUTPATIENT)
Dept: FAMILY MEDICINE CLINIC | Age: 62
End: 2022-08-12

## 2022-08-12 NOTE — TELEPHONE ENCOUNTER
----- Message from [facility] sent at 8/12/2022  9:40 AM EDT -----  Subject: Message to Provider    QUESTIONS  Information for Provider? Wu Isabel would like to be put on gabapentin. Her   endings are shot. Please call her TODAY, 8/12. Please send to [facility] in   [place].   ---------------------------------------------------------------------------  --------------  [address]  [phone]; OK to leave message on voicemail  ---------------------------------------------------------------------------  --------------  SCRIPT ANSWERS  Relationship to Patient?  Self

## 2022-08-22 ENCOUNTER — OFFICE VISIT (OUTPATIENT)
Dept: NEUROSURGERY | Age: 62
End: 2022-08-22
Payer: COMMERCIAL

## 2022-08-22 VITALS
WEIGHT: 222 LBS | SYSTOLIC BLOOD PRESSURE: 138 MMHG | DIASTOLIC BLOOD PRESSURE: 89 MMHG | HEIGHT: 65 IN | BODY MASS INDEX: 36.99 KG/M2 | OXYGEN SATURATION: 98 % | HEART RATE: 88 BPM

## 2022-08-22 DIAGNOSIS — M47.26 OTHER SPONDYLOSIS WITH RADICULOPATHY, LUMBAR REGION: Primary | ICD-10-CM

## 2022-08-22 DIAGNOSIS — M47.816 LUMBAR FACET ARTHROPATHY: ICD-10-CM

## 2022-08-22 PROCEDURE — 99204 OFFICE O/P NEW MOD 45 MIN: CPT | Performed by: NURSE PRACTITIONER

## 2022-08-22 RX ORDER — GABAPENTIN 100 MG/1
100 CAPSULE ORAL 3 TIMES DAILY
Qty: 90 CAPSULE | Refills: 0 | Status: SHIPPED | OUTPATIENT
Start: 2022-08-22 | End: 2022-08-24 | Stop reason: SDUPTHER

## 2022-08-22 NOTE — PROGRESS NOTES
Picolinate 200 MCG CAPS Take by mouth      Cholecalciferol (VITAMIN D-3) 25 MCG (1000 UT) CAPS Take by mouth      Cranberry 1000 MG CAPS Take by mouth      loperamide (IMODIUM) 1 MG/5ML solution Take by mouth 4 times daily as needed for Diarrhea (Patient not taking: Reported on 8/22/2022)      meloxicam (MOBIC) 15 MG tablet TAKE ONE TABLET BY MOUTH DAILY (Patient not taking: Reported on 8/22/2022) 30 tablet 5    clotrimazole (LOTRIMIN) 1 % cream Apply topically 2 times daily Apply topically 2 times daily. (Patient not taking: Reported on 8/22/2022) 1 each 1     No current facility-administered medications on file prior to visit. Social History     Tobacco Use    Smoking status: Never    Smokeless tobacco: Never   Vaping Use    Vaping Use: Never used   Substance Use Topics    Alcohol use: Never    Drug use: Never       Allergies   Allergen Reactions    Insulins      Feels like she is having a stroke      Sulfa Antibiotics Hives       Review of Systems  Constitutional: Negative for activity change and appetite change. HENT: Negative for ear pain and facial swelling. Eyes: Negative for discharge and itching. Respiratory: Negative for choking and chest tightness. Cardiovascular: Negative for chest pain and leg swelling. Gastrointestinal: Negative for nausea and abdominal pain. Endocrine: Negative for cold intolerance and heat intolerance. Genitourinary: Negative for frequency and flank pain. Musculoskeletal: Negative for myalgias and joint swelling. Skin: Negative for rash and wound. Allergic/Immunologic: Negative for environmental allergies and food allergies. Hematological: Negative for adenopathy. Does not bruise/bleed easily. Psychiatric/Behavioral: Negative for self-injury. The patient is not nervous/anxious.       Physical Exam:      /89   Pulse 88   Ht 5' 5\" (1.651 m)   Wt 222 lb (100.7 kg)   SpO2 98%   BMI 36.94 kg/m²   Estimated body mass index is 36.94 kg/m² as calculated from the following:    Height as of this encounter: 5' 5\" (1.651 m). Weight as of this encounter: 222 lb (100.7 kg). General:  Lisa Hernandez is a 57y.o. year old female who appears her stated age. HEENT: Normocephalic atraumatic. Neck supple. Chest: regular rate; pulses equal  Abdomen: Soft nontender nondistended. Ext: DP and PT pulses 2+, good cap refill  Neuro    Mentation  Appropriate affect  Registration intact  Orientation intact  Judgement intact to situation    Cranial Nerves:   Pupils equal and reactive to light  Extraocular motion intact  Face and shrug symmetric  Tongue midline  No dysarthria  v1-3 sensation symmetric, masseter tone symmetric  Hearing symmetric    Sensation: Decreased approximate left L5    Motor  L deltoid 5/5; R deltoid 5/5  L biceps 5/5; R biceps 5/5  L triceps 5/5; R triceps 5/5  L wrist extension 5/5; R wrist extension 5/5  L intrinsics 5/5; R intrinsics 5/5     L iliopsoas 5/5 , R iliopsoas 5/5  L quadriceps 5/5; R quadriceps 5/5  L Dorsiflexion 5/5; R dorsiflexion 5/5  L Plantarflexion 5/5; R plantarflexion 5/5  L EHL 5/5; R EHL 5/5    Reflexes  L Brachioradialis 2+/4; R brachioradialis 2+/4  L Biceps 2+/4; R Biceps 2+/4  L Triceps 2+/4; R Triceps 2+/4  L Patellar 2+/4: R Patellar 2+/4  L Achilles 2+/4; R Achilles 2+/4    hoffmans L: neg  hoffmans R: neg  Clonus L: neg  Clonus R: neg  Babinski L: neg  Babinski R: neg    +SLR left side    Studies Review:     MRI lumbar 11/29/2021:    FINDINGS:   BONES/ALIGNMENT: There is normal alignment of the spine. The vertebral body   heights are maintained. The bone marrow signal appears unremarkable. SPINAL CORD: The conus terminates normally. SOFT TISSUES: No paraspinal mass identified. L1-L2: Disc desiccation without herniation. No significant central canal,   lateral recess or neural foraminal stenoses. L2-L3: Disc desiccation with minimal disc bulge.   Mild facet and ligamentum   flavum Priority:   Routine     Referral Type:   Eval and Treat     Referral Reason:   Specialty Services Required     Requested Specialty:   Physical Therapist     Number of Visits Requested:   1    [facility]     Referral Priority:   Routine     Referral Type:   Eval and Treat     Referral Reason:   Specialty Services Required     Requested Specialty:   Pain Management     Number of Visits Requested:   1        Electronically signed by FAITH Dutton CNP on 8/22/2022 at 3:21 PM    Please note that this chart was generated using voice recognition Dragon dictation software. Although every effort was made to ensure the accuracy of this automated transcription, some errors in transcription may have occurred.

## 2022-08-23 ENCOUNTER — TELEPHONE (OUTPATIENT)
Dept: FAMILY MEDICINE CLINIC | Age: 62
End: 2022-08-23

## 2022-08-23 NOTE — TELEPHONE ENCOUNTER
Patient called stating that her neurologist prescribed her gabapentin and she went to pick it up from the pharmacy and they told her that the neurologist cannot be the one to fill that medication because she is only licensed in [place]. The patient states that Cesar Garcia told her at her last appointment that the gabapentin was something that he was willing to prescribe her so the patient would just like for Cesar Garcia to send that in for her. Patient states she does not want to fill the rx in [place] in order for the neurologist's rx to be valid, she would like to stick with her regular pharmacy. Please advise.

## 2022-08-24 DIAGNOSIS — M47.26 OTHER SPONDYLOSIS WITH RADICULOPATHY, LUMBAR REGION: ICD-10-CM

## 2022-08-24 RX ORDER — GABAPENTIN 100 MG/1
100 CAPSULE ORAL 3 TIMES DAILY
Qty: 90 CAPSULE | Refills: 0 | Status: SHIPPED | OUTPATIENT
Start: 2022-08-24 | End: 2022-09-29 | Stop reason: SDUPTHER

## 2022-08-29 ENCOUNTER — OFFICE VISIT (OUTPATIENT)
Dept: FAMILY MEDICINE CLINIC | Age: 62
End: 2022-08-29
Payer: COMMERCIAL

## 2022-08-29 VITALS
BODY MASS INDEX: 38.15 KG/M2 | RESPIRATION RATE: 16 BRPM | HEART RATE: 77 BPM | TEMPERATURE: 98.4 F | WEIGHT: 229 LBS | SYSTOLIC BLOOD PRESSURE: 138 MMHG | DIASTOLIC BLOOD PRESSURE: 86 MMHG | HEIGHT: 65 IN | OXYGEN SATURATION: 98 %

## 2022-08-29 DIAGNOSIS — E11.9 TYPE 2 DIABETES MELLITUS WITHOUT COMPLICATION, WITHOUT LONG-TERM CURRENT USE OF INSULIN (HCC): ICD-10-CM

## 2022-08-29 DIAGNOSIS — M54.50 CHRONIC LEFT-SIDED LOW BACK PAIN WITHOUT SCIATICA: Primary | ICD-10-CM

## 2022-08-29 DIAGNOSIS — H92.02 OTALGIA, LEFT: ICD-10-CM

## 2022-08-29 DIAGNOSIS — E03.9 HYPOTHYROIDISM, UNSPECIFIED TYPE: ICD-10-CM

## 2022-08-29 DIAGNOSIS — G89.29 CHRONIC LEFT-SIDED LOW BACK PAIN WITHOUT SCIATICA: Primary | ICD-10-CM

## 2022-08-29 PROCEDURE — 99213 OFFICE O/P EST LOW 20 MIN: CPT | Performed by: NURSE PRACTITIONER

## 2022-08-29 PROCEDURE — 3051F HG A1C>EQUAL 7.0%<8.0%: CPT | Performed by: NURSE PRACTITIONER

## 2022-08-29 ASSESSMENT — ENCOUNTER SYMPTOMS
SINUS PAIN: 0
VOMITING: 0
SHORTNESS OF BREATH: 0
SORE THROAT: 0
COUGH: 0
DIARRHEA: 0
BACK PAIN: 1
ABDOMINAL PAIN: 0
NAUSEA: 0

## 2022-08-29 NOTE — PROGRESS NOTES
[address] [facility]  [address] [facility][note 3] [address] [address]  Dept: [phone]  Dept Fax: [phone]    Lubna Malin is a 57 y.o. female who presents today for her medicalconditions/complaints as noted below. Lubna Malin is c/o of Back Pain (Follow up from neurosurgery appointment ) and Otalgia (Left)      HPI:        56-year-old female patient presents with complaints of follow-up. Left ear drainage and mild pain. Abdominal pain, ongoing. Seeing GI. Had colonoscopy and egd showing mild gastritis. Improving. Ongoing left-sided lower back pain. Had recent MRI showing several areas of disc bulging. Has been treating with Mobic and Tylenol without complete resolution. Used to take a muscle relaxant with partial benefits. Referral to pain management placed. Wants to see neurosurgery who also referred to pain clinic, recommended pt and trialing gabapentin. Type 2 diabetes last A1c 9.1 to 7.6 at last check. Discontinued Janumet and actos on her own as she thinks this was causing her GI distress. Has not seen much elevation in blood sugar. Taking berberine and other supplements. Currently on NP thyroid 60, last TSH normal, hx of thyroid nodule last ultrasound normal.       Past Medical History:   Diagnosis Date    Diabetes mellitus ([facility] [address]     Hypothyroidism         Current Outpatient Medications   Medication Sig Dispense Refill    Barberry-Oreg Grape-Goldenseal (BERBERINE COMPLEX PO) Take by mouth      NONFORMULARY Glucocare      neomycin-polymyxin-hydrocortisone (CORTISPORIN) 3.5-74881-4 otic solution Place 4 drops into the left ear 3 times daily for 7 days Instill into left Ear 1 each 0    gabapentin (NEURONTIN) 100 MG capsule Take 1 capsule by mouth 3 times daily for 30 days.  Intended supply: 30 days 90 capsule 0    meloxicam (MOBIC) 15 MG tablet TAKE ONE TABLET BY MOUTH DAILY 30 tablet 5    NP THYROID 60 MG tablet TAKE ONE TABLET BY MOUTH DAILY 30 tablet 5    Cyanocobalamin (VITAMIN B 12 PO) Take by mouth      OIL OF OREGANO PO Take by mouth      Cholecalciferol (VITAMIN D-3) 25 MCG (1000 UT) CAPS Take by mouth      Cranberry 1000 MG CAPS Take by mouth      Chromium Picolinate 200 MCG CAPS Take by mouth       No current facility-administered medications for this visit. Allergies   Allergen Reactions    Insulins      Feels like she is having a stroke      Sulfa Antibiotics Hives       Subjective:      Review of Systems   Constitutional:  Negative for chills and fever. HENT:  Positive for ear discharge and ear pain. Negative for sinus pain and sore throat. Respiratory:  Negative for cough and shortness of breath. Cardiovascular:  Negative for chest pain and palpitations. Gastrointestinal:  Negative for abdominal pain, diarrhea, nausea and vomiting. Musculoskeletal:  Positive for back pain. Neurological:  Negative for dizziness and headaches. All other systems reviewed and are negative.    :Objective     Physical Exam  Vitals and nursing note reviewed. HENT:      Right Ear: There is impacted cerumen. Left Ear: Drainage and swelling present. Cardiovascular:      Rate and Rhythm: Normal rate. Pulmonary:      Effort: Pulmonary effort is normal.      Breath sounds: Normal breath sounds. /86 (Site: Left Upper Arm, Position: Sitting, Cuff Size: Medium Adult)   Pulse 77   Temp 98.4 °F (36.9 °C) (Tympanic)   Resp 16   Ht 5' 5\" (1.651 m)   Wt 229 lb (103.9 kg)   SpO2 98%   BMI 38.11 kg/m²     Lab Review   Office Visit on 07/11/2022   Component Date Value    Hemoglobin A1C 07/11/2022 7.6     Microalb, Ur 07/11/2022 80     Creatinine Ur POCT 07/11/2022 100     Microalbumin Creatinine * 07/11/2022     Admission on 04/22/2022, Discharged on 04/22/2022   Component Date Value    POC Glucose 04/22/2022 184 (A)    Surgical Pathology Report 04/22/2022                      Value:-- Diagnosis --  A. SMALL BOWEL, BIOPSY:  -DUODENAL MUCOSA WITH A NORMAL VILLOUS ARCHITECTURE AND NO INCREASE IN  INTRAEPITHELIAL LYMPHOCYTES. B.  STOMACH, BIOPSY:  -GASTRIC ANTRAL AND BODY TYPE MUCOSA WITH PATCHY MINIMAL TO MILD  CHRONIC GASTRITIS. C.  COLON, RANDOM BIOPSY:  -UNREMARKABLE COLONIC MUCOSA WITH NO SIGNIFICANT INFLAMMATION AND NO  FEATURES OF MICROSCOPIC COLITIS. Maria Rocha M.D.  **Electronically Signed Out**         jesús/4/26/2022       Clinical Information  Pre-op Diagnosis:  ABD PAIN   Operative Findings:  BIOPSY OF SMALL BOWEL RULE OUT CELIAC; BIOPSY  STOMACH; RANDOM COLON BIOPSIES RULE OUT MICROSCOPIC COLITIS  Operation Performed:  COLONOSCOPY WITH BIOPSY, EGD BIOPSY     Source of Specimen  A: BIOPSY OF SMALL BOWEL  B: BIOPSY STOMACH  C: RANDOM COLON BIOPSIES    Gross Description  A. \"KELVIN EDGAR, BIOPSY OF SMALL BOWEL\" Three tan-white tissue  fragments from 0.1 to 0.3 cm and are 0.4 x 0.4 x 0.2 cm in aggregate. Entirely 1cs. B. \"KELVIN BIDW                          ELL, STOMACH BIOPSY\" Four tan-white tissue fragments  from 0.1 to 0.4 cm and are 0.6 x 0.4 x 0.2 cm in aggregate. Entirely  1cs. C. \"KELVIN EDGAR, RANDOM COLON BIOPSIES\" Multiple tan-white tissue  fragments from 0.1 to 0.3 cm and are 1.0 x 0.6 x 0.2 cm in aggregate. Entirely 1cs. mpb tm       Microscopic Description  A-C. Microscopic examination performed. SURGICAL PATHOLOGY CONSULTATION       Patient Name: Nancy Fregoso  Med Rec: [id number]  Path Number: [id number]    [facility]   [place], [address]  [phone]  Fax: [phone]     Hospital Outpatient Visit on 04/04/2022   Component Date Value    Gliadin Deaminidated Pep* 04/04/2022 1.0     Gliadin Deaminidated Pep* 04/04/2022 <0.4     IgA 04/04/2022 202     TISSUE TRANSGLUTAMINASE * 04/04/2022 0.6     TSH 04/04/2022 1.33     Thyroxine, Free 04/04/2022 1.43 NEGATIVE     - 03/19/2022          WBC, UA 03/19/2022 10 TO 20     RBC, UA 03/19/2022 0 TO 2     Casts UA 03/19/2022 0 TO 2     Casts UA 03/19/2022 HYALINE     Casts UA 03/19/2022 0 TO 2     Casts UA 03/19/2022 FINE GRANULAR     Crystals, UA 03/19/2022 5 TO 10 URIC ACID (A)    Epithelial Cells UA 03/19/2022 2 TO 5     Bacteria, UA 03/19/2022 FEW (A)    Mucus, UA 03/19/2022 1+ (A)    Amorphous, UA 03/19/2022 1+ (A)   Orders Only on 03/15/2022   Component Date Value    Potassium (K+) 03/14/2022 4.5    Hospital Outpatient Visit on 02/28/2022   Component Date Value    Creatinine 02/28/2022 0.48 (A)    GFR Non- 02/28/2022 >60     GFR  02/28/2022 >60     GFR Comment 02/28/2022             Assessment and Plan      1. Chronic left-sided low back pain without sciatica  -     [facility]  2. Hypothyroidism, unspecified type  3. Type 2 diabetes mellitus without complication, without long-term current use of insulin ([address]  4. Otalgia, left  -     neomycin-polymyxin-hydrocortisone (CORTISPORIN) 3.5-33572-0 otic solution; Place 4 drops into the left ear 3 times daily for 7 days Instill into left Ear, Disp-1 each, R-0Normal     External ear drops sent    Referral to pt placed  Waiting on pain clinic pending pt  Continue mobic, gabapentin          No results found for this visit on 08/29/22. Return in about 2 months (around 10/29/2022), or if symptoms worsen or fail to improve. Orders Placed This Encounter   Medications    neomycin-polymyxin-hydrocortisone (CORTISPORIN) 3.5-51189-9 otic solution     Sig: Place 4 drops into the left ear 3 times daily for 7 days Instill into left Ear     Dispense:  1 each     Refill:  0        Patient given educational materials - see patient instructions. Discussed use, benefit, and side effects of prescribed medications. All patientquestions answered. Pt voiced understanding.     Patient given educational materials - see patient instructions. Discussed use, benefit, and side effects of prescribed medications. All patientquestions answered. Pt voiced understanding. This note was transcribed using dictation with Dragon services. Efforts were made to correct any errors but some words may be misinterpreted.     Patient assumes risks associated with failure to complete recommended testing and treatments in a timely manner    Electronically signed by FAITH Gutiérrez CNP on 8/29/2022at 11:45 AM

## 2022-08-29 NOTE — PATIENT INSTRUCTIONS
Patient Education        Back Pain: Care Instructions  Your Care Instructions     Back pain has many possible causes. It is often related to problems with muscles and ligaments of the back. It may also be related to problems with the nerves, discs, or bones of the back. Moving, lifting, standing, sitting, or sleeping in an awkward way can strain the back. Sometimes you don't notice theinjury until later. Arthritis is another common cause of back pain. Although it may hurt a lot, back pain usually improves on its own within several weeks. Most people recover in 12 weeks or less. Using good home treatment and being careful not to stress your back can help you feel bettersooner. Follow-up care is a key part of your treatment and safety. Be sure to make and go to all appointments, and call your doctor if you are having problems. It's also a good idea to know your test results and keep alist of the medicines you take. How can you care for yourself at home? Sit or lie in positions that are most comfortable and reduce your pain. Try one of these positions when you lie down:  Lie on your back with your knees bent and supported by large pillows. Lie on the floor with your legs on the seat of a sofa or chair. Lie on your side with your knees and hips bent and a pillow between your legs. Lie on your stomach if it does not make pain worse. Do not sit up in bed, and avoid soft couches and twisted positions. Bed rest can help relieve pain at first, but it delays healing. Avoid bed rest after the first day of back pain. Change positions every 30 minutes. If you must sit for long periods of time, take breaks from sitting. Get up and walk around, or lie in a comfortable position. Try using a heating pad on a low or medium setting for 15 to 20 minutes every 2 or 3 hours. Try a warm shower in place of one session with the heating pad. You can also try an ice pack for 10 to 15 minutes every 2 to 3 hours.  Put a thin cloth between the ice pack and your skin. Take pain medicines exactly as directed. If the doctor gave you a prescription medicine for pain, take it as prescribed. If you are not taking a prescription pain medicine, ask your doctor if you can take an over-the-counter medicine. Take short walks several times a day. You can start with 5 to 10 minutes, 3 or 4 times a day, and work up to longer walks. Walk on level surfaces and avoid hills and stairs until your back is better. Return to work and other activities as soon as you can. Continued rest without activity is usually not good for your back. To prevent future back pain, do exercises to stretch and strengthen your back and stomach. Learn how to use good posture, safe lifting techniques, and proper body mechanics. When should you call for help? Call your doctor now or seek immediate medical care if:    You have new or worsening numbness in your legs. You have new or worsening weakness in your legs. (This could make it hard to stand up.)     You lose control of your bladder or bowels. Watch closely for changes in your health, and be sure to contact your doctor if:    You have a fever, lose weight, or don't feel well. You do not get better as expected. Where can you learn more? Go to https://[email or web address].Enefgy. org and sign in to your XING account. Enter M304 in the [facility] box to learn more about \"Back Pain: Care Instructions. \"     If you do not have an account, please click on the \"Sign Up Now\" link. Current as of: March 9, 2022               Content Version: 13.3  © 2006-2022 Healthwise, Incorporated. Care instructions adapted under license by [facility] ([facility]). If you have questions about a medical condition or this instruction, always ask your healthcare professional. [place] [address] any warranty or liability for your use of this information.

## 2022-09-07 ENCOUNTER — HOSPITAL ENCOUNTER (OUTPATIENT)
Dept: PHYSICAL THERAPY | Age: 62
Setting detail: THERAPIES SERIES
Discharge: HOME | End: 2022-09-07
Payer: COMMERCIAL

## 2022-09-07 PROCEDURE — 97161 PT EVAL LOW COMPLEX 20 MIN: CPT

## 2022-09-07 NOTE — CONSULTS
[x] Erika Torres  Outpatient Physical Therapy  [address]  Phone: [phone]  Fax: [phone] [] [address] [facility]   Phone: [phone]  Fax: [phone]  [] [facility] for [facility] at [address]  Phone: [phone]   Fax: [phone]     Physical Therapy Evaluation    Date:  2022  Patient: Celi Miss  : 1960  MRN: [id number]  Physician:Chapo Wynne, APRN - CNP    Insurance: [email or web address] after Eval   Medical Diagnosis: Chronic left-sided low back pain without sciatica M54.50   Rehab Codes: M54.59  Onset Date: 22                                 Next 's appt: 10/17/22 PCP, Neurosurg 22    Subjective:   CC:Patient reports chronic low back pain for 2 years. Pain is located midline lumbar region and radiates into her left hip to her knee. Patient also reports left leg weakness and difficulty walking at times, She reports tripping over her L foot often and has had multiple falls. Last fall was a few days ago. She ambulates with a straight cane and notes difficulty with uneven surfaces. She also states she is unable to get off floor without physical assistance. HPI: Pain started with a near fall incident where patient stepped down very hard onto her left leg and felt immediate back pain. Has had 12 PT visits ([address]) and approximately 28 chiropractor visits with limited relief just over a year ago. Utilizes heat/ice, does not seem to help much.     PMHx: [] Unremarkable [x] Diabetes [] HTN  [] Pacemaker   [] MI/Heart Problems [] Cancer [] Arthritis [] Asthma                         [x] refer to full medical chart  In EPIC  [] Other:       Past Medical History:   Diagnosis Date    Diabetes mellitus ([facility] [address]     Hypothyroidism         Comorbidities:   [x] Obesity [] Dialysis  [] Other:   [] Asthma/COPD [] Dementia [] Other:   [] Stroke [] Sleep apnea [] Other:   [] Vascular disease [] Rheumatic disease [] Other:     Tests: [] X-Ray: [x] MRI:11/8/21 Lumbar  [] Other:  1. No fracture or bony destructive lesion. 2. Degenerative changes. No significant central canal stenosis. 3.  Multilevel neural foraminal stenoses, worst (moderate) at the left L4-5   level. 4. Trace fluid in the right facet joint at T3-4 is likely due to arthrosis.      Medications: [x] Refer to full medical record [] None [] Other:  Allergies:      [x] Refer to full medical record  [] None [] Other:    Function:    Patient lives with: Alone   In what type of home [x]  One story   [] Two story   [] Split level   Number of stairs to enter 2   With handrail on the [x]  Right to enter   [] Left to enter   Bathroom has a []  Tub only  [x] Tub/shower combo   [] Walk in shower    []  Grab bars   Washing machine is on [x]  Main level   [] Second level   [] Basement     ADL/IADL Previous level of function Current level of function Who currently assists the patient with task   Bathing  [x] Independent  [] Assist [x] Independent  [] Assist    Dress/grooming [x] Independent  [] Assist [x] Independent  [] Assist    Transfer/mobility [x] Independent  [] Assist [x] Independent  [] Assist    Feeding [x] Independent  [] Assist [x] Independent  [] Assist    Toileting [x] Independent  [] Assist [x] Independent  [] Assist    Driving [x] Independent  [] Assist [x] Independent  [] Assist    Housekeeping [x] Independent  [] Assist [x] Independent  [] Assist    Grocery shop/meal prep [x] Independent  [] Assist [x] Independent  [] Assist      Gait Prior level of function Current level of function    [x] Independent  [] Assist [x] Independent  [] Assist   Device: [x] Independent [x] Independent    [] Straight Cane [] Quad cane [] Straight Cane [] Quad cane    [] Standard walker [] Rolling walker   [] 4 wheeled walker [] Standard walker [] Rolling walker   [] 4 wheeled walker    [] Wheelchair [] reach past her knees to improve lifting. ? Strength: left dorsiflexion to 4-/5 to improve foot drop. ? Function:Oswestry score of 30% impaired or better to improve ADLs. Independent with Home Exercise Programs    LTG: (to be met in 12 treatments)  Patient reports no falls x 1 month. TUG score of 15 seconds or better to decreased fall risk. Patient goals: Restore lumbar function, improve gait, stop falling. Rehab Potential:  [x] Good  [] Fair  [] Poor   Suggested Professional Referral:  [x] No  [] Yes:  Barriers to Goal Achievement[de-identified]  [x] No  [] Yes:  Domestic Concerns:  [x] No  [] Yes:    Pt. Education:  [x] Plans/Goals, Risks/Benefits discussed  [x] Home exercise program: See chart below  Method of Education: [x] Verbal  [x] Demo  [x] Written  Comprehension of Education:  [x] Verbalizes understanding. [x] Demonstrates understanding. [x] Needs Review. [] Demonstrates/verbalizes understanding of HEP/Ed previously given. Treatment Plan:  [x] Therapeutic Exercise   68791  [x] Vasopneumatic cold with compression  25252    [x] Therapeutic Activity  95258 [x] Cold/hotpack    [x] Gait Training   97949 [x] Lumbar/Cervical Traction  [id number]   [x] Neuromuscular Re-education  06556 [x] Electrical Stimulation Unattended  34982   [x] Manual Therapy    77148 [x] Electrical Stimulation Attended  [id number]   [] Iontophoresis: 4 mg/mL Dexamethasone Sodium Phosphate  mAmin  43153 []  Medication allergies reviewed for use of   Dexamethasone Sodium Phosphate 4mg/ml with iontophoresis treatments. Pt is not allergic. Frequency:  2 x/week for 12 visits    Todays Treatment:  Precautions:  Modalities:   Exercises:  Access Code: [email or web address]  URL: [email or web address]  Date: 09/08/2022  Prepared by: Baldemar Colunga    Exercises  Supine March - 1 x daily - 3 x weekly - 3 sets - 10 reps  Supine Lower Trunk Rotation - 1 x daily - 3 x weekly - 3 sets - 10 reps  Small Range Straight Leg Raise - 1 x daily - 3 x weekly - 3 sets - 10 reps      Specific Instructions for next treatment[de-identified] General core strengthening, DF strengthening, balance and gait, floor to standing transfers when able       Evaluation Complexity:  History (Personal factors, comorbidities) [] 0 [x] 1-2 [] 3+   Exam (limitations, restrictions) [] 1-2 [x] 3 [] 4+   Clinical presentation (progression) [x] Stable [] Evolving  [] Unstable   Decision Making [x] Low [] Moderate [] High    [x] Low Complexity [] Moderate Complexity [] High Complexity       Treatment Charges: Mins Units   [x] Evaluation       [x]  Low       []  Moderate       []  High 30 1   []  Modalities     []  Ther Exercise     []  Manual Therapy     []  Ther Activities     []  Aquatics     []  Vasocompression     []  Other       TOTAL TREATMENT TIME: 30      Time in:1700     Time out:1730    Electronically signed by: Mor Maldonado, PT

## 2022-09-08 NOTE — PRE-CERTIFICATION NOTE
..       [x] [address]  [address]  [phone]  F: [phone] [] [address] [facility]  [address]\[facility] [place] [facility]\"" [address]   [address]  P: [phone]  F: [phone] [] [place]  [address] [place] [address]  P: [phone]  F: [phone] [] [address]  [facility]   [place]: [phone]  F: [phone]  [] [facility]   Outpatient Occupational Therapy  [address]: [phone]  F: [phone]          Therapy Pre-certification Note      9/8/2022    Lee Ferro  1960   [id number]      Insurance approval was received for Physical Therapy from [facility] on 9/8/22 over the phone. Approval was received for 10 visits, from 9/8/22 to 11/7/22. Authorization number [id number]. Patient was contacted to be scheduled and has been scheduled for follow-up visits.       Electronically signed by Gautam Grant on 9/8/2022 at 4:00 PM

## 2022-09-20 ENCOUNTER — HOSPITAL ENCOUNTER (OUTPATIENT)
Dept: PHYSICAL THERAPY | Age: 62
Setting detail: THERAPIES SERIES
Discharge: HOME | End: 2022-09-20
Payer: COMMERCIAL

## 2022-09-20 PROCEDURE — 97110 THERAPEUTIC EXERCISES: CPT

## 2022-09-20 NOTE — FLOWSHEET NOTE
[x] [facility] &  Therapy  [address]  [phone]  F: [phone] [] [address]  [address]  P: [phone]  F: [phone] [] [address]  [address]  P: [phone]  F: [phone] [] [address]  P: [phone]  F: [phone] [] [address]  [facility]   [address]   [place]: [phone]  F: [phone]      Physical Therapy Daily Treatment Note    Date:  2022  Patient Name:  Katheen Halsted    :  1960  MRN: [id number]  Physician:Chapo Dye, FAITH - JAMIE               Insurance: Fer[employer]   Approval was received for 10 visits, from 22 to 22. Authorization number [id number]. Medical Diagnosis: Chronic left-sided low back pain without sciatica M54.50         Rehab Codes: M54.59  Onset Date: 22                                 Next 's appt: 10/17/22 PCP, Neurosurg 22  Visit# / total visits: 2/12     Cancels/No Shows: 0/0    Subjective:    Pain:  [] Yes  [x] No Location: low back  Pain Rating: (0-10 scale) -/10 weakness in L ankle dorsiflexors  Pain altered Tx:  [x] No  [] Yes  Action:  Comments: Denies pain numeric at arrival. Difficulty ambulating when environment is unfamiliar due to inability to hold onto objects.  Compliant with HEP exercises given at [facility].     Objective:  Modalities:   Precautions:  Exercises:  Exercise Reps/ Time Weight/ Level Comments   Nustep 5 min           supine      marches 2x10     Lower trunk rotation 2x10     Small range SLR 5x     PPT 2x10     Iso abs 10x     Iso abs marches 2x10           seated      Heel-to-toe 2x10     Rockerboard  2x10 Lvl 1          SB rollouts flex, LTR 10x5\"     Forward touching floor 10x ea HS stretch w/ stool 5x15\"                       Other:         Treatment Charges: Mins Units   []  Modalities     [x]  Ther Exercise 45 3   []  Manual Therapy     []  Ther Activities     []  Aquatics     []  Vasocompression     []  Other     Total Treatment time 45 3       Assessment: [x] Progressing toward goals. Review of HEP exercises and initiated progressions as noted in log this date with good tolerance. No pain or symptom onset throughout treatment. Patient demonstrates most difficulty with L dorsiflexion weakness and ROM. Verbal education to continue HEP completion with good verbal understanding. [] No change. [] Other:  [x] Patient would continue to benefit from skilled physical therapy services in order to: improve left LE strength, trunk stability, and decrease fall risk. Problems:    [x] ? Pain: 7/10 low back   [x] ? ROM: all lumbar ROM  [x] ? Flexibility: lumbar extensors  [x] ? Strength:Left LE, Dorsiflexors  [x] ? Function: Oswestry score 50% functional   [] Other:     STG: (to be met in 8 treatments)  ? Pain: 4/10 low back pain with ADLs. ? ROM:Patient is able to bend forward and reach past her knees to improve lifting. ? Strength: left dorsiflexion to 4-/5 to improve foot drop. ? Function:Oswestry score of 30% impaired or better to improve ADLs. Independent with Home Exercise Programs     LTG: (to be met in 12 treatments)  Patient reports no falls x 1 month. TUG score of 15 seconds or better to decreased fall risk. Patient goals: Restore lumbar function, improve gait, stop falling. Pt. Education:  [x] Yes  [] No  [x] Reviewed Prior HEP/Ed  Method of Education: [x] Verbal  [x] Demo  [] Written  Comprehension of Education:  [] Verbalizes understanding. [] Demonstrates understanding. [] Needs review. [x] Demonstrates/verbalizes HEP/Ed previously given. Plan: [x] Continue current frequency toward long and short term goals.     [x] Specific Instructions for subsequent treatments: General core strengthening, DF strengthening, balance and gait, floor to standing transfers when able     Frequency:  2 x/week for 12 visits      Time In: 520            Time Out: 610    Electronically signed by:  Daniel Lopez    Patient treated and note written by Amanda Yo, Student Physical Therapist Assistant under supervision of Levi Kevin PTA.

## 2022-09-22 ENCOUNTER — APPOINTMENT (OUTPATIENT)
Dept: PHYSICAL THERAPY | Age: 62
End: 2022-09-22
Payer: COMMERCIAL

## 2022-09-27 ENCOUNTER — HOSPITAL ENCOUNTER (OUTPATIENT)
Dept: PHYSICAL THERAPY | Age: 62
Setting detail: THERAPIES SERIES
Discharge: HOME | End: 2022-09-27
Payer: COMMERCIAL

## 2022-09-27 PROCEDURE — 97110 THERAPEUTIC EXERCISES: CPT

## 2022-09-27 PROCEDURE — 97116 GAIT TRAINING THERAPY: CPT

## 2022-09-27 NOTE — FLOWSHEET NOTE
[x] [facility] CENTER &  Therapy  [address]  [phone]  F: [phone] [] [address]  [facility]   [address]  P: [phone]  F: [phone] [] [address]  [address]  P: [phone]  F: [phone] [] [address]  P: [phone]  F: [phone] [] [address]  [facility]   [address]   [place]: [phone]  F: [phone]      Physical Therapy Daily Treatment Note    Date:  2022  Patient Name:  Johnny Gandara    :  1960  MRN: [id number]  Physician:Chapo Ricks, APRN - CNP               Insurance: [place]   Approval was received for 10 visits, from 22 to 22. Authorization number [id number]. Medical Diagnosis: Chronic left-sided low back pain without sciatica M54.50         Rehab Codes: M54.59  Onset Date: 22                                 Next 's appt: 10/17/22 PCP, Neurosurg 22  Visit# / total visits: 3/12     Cancels/No Shows: 0/0    Subjective:    Pain:  [] Yes  [x] No Location: L hip and groin area  Pain Rating: (0-10 scale) 3-4/10   Pain altered Tx:  [x] No  [] Yes  Action:  Comments: Patient reports fall on Friday, , after tripping on grass due to fatigue following a long day.      Objective:  Modalities:   Precautions:  Exercises:  Exercise Reps/ Time Weight/ Level Comments   Nustep 5 min           Standing      Gastroc stretch w/ wedge 3x30\"  Added    Heel raises 10x  Added    Heel-toe 10x  Added    Haile DF stretch 3x30\" 12\" Added          supine      marches 2x10     Lower trunk rotation 2x10     Small range SLR 5x     PPT 2x10     Iso abs 10x     Iso abs marches 2x10           seated      Heel-to-toe 2x10     Rockerboard  2x10 Lvl core strengthening, DF strengthening, balance and gait, floor to standing transfers when able, issue 4-way ankle and Tband HEP     Frequency:  2 x/week for 12 visits      Time In:  525 pm         Time Out: 625 pm    Electronically signed by:  Juan Daley    Patient treated and note written by Fara Fabry, Student Physical Therapist Assistant under supervision of Dinorah Chaudhary PTA.

## 2022-09-29 ENCOUNTER — HOSPITAL ENCOUNTER (OUTPATIENT)
Dept: PHYSICAL THERAPY | Age: 62
Setting detail: THERAPIES SERIES
Discharge: HOME | End: 2022-09-29
Payer: COMMERCIAL

## 2022-09-29 DIAGNOSIS — M47.26 OTHER SPONDYLOSIS WITH RADICULOPATHY, LUMBAR REGION: ICD-10-CM

## 2022-09-29 PROCEDURE — 97110 THERAPEUTIC EXERCISES: CPT

## 2022-09-29 RX ORDER — GABAPENTIN 100 MG/1
100 CAPSULE ORAL 3 TIMES DAILY
Qty: 90 CAPSULE | Refills: 0 | Status: SHIPPED | OUTPATIENT
Start: 2022-09-29 | End: 2022-10-17

## 2022-09-29 NOTE — FLOWSHEET NOTE
[x] [facility]) [facility] CENTER &  Therapy  [address]  [phone]  F: [phone] [] [address]  [facility]   [address]  P: [phone]  F: [phone] [] [address]  [address]  P: [phone]  F: [phone] [] [address]  P: [phone]  F: [phone] [] [address]  [facility]   [address]   [place]: [phone]  F: [phone]      Physical Therapy Daily Treatment Note    Date:  2022  Patient Name:  Nam Sewell    :  1960  MRN: [id number]  Physician:FAITH Gómez - JAMIE               Insurance: [place]   Approval was received for 10 visits, from 22 to 22. Authorization number [id number]. Medical Diagnosis: Chronic left-sided low back pain without sciatica M54.50         Rehab Codes: M54.59  Onset Date: 22                                 Next 's appt: 10/17/22 PCP, Neurosurg 22  Visit# / total visits: 4/10     Cancels/No Shows: 0/0    Subjective:    Pain:  [] Yes  [x] No Location: L hip and groin area  Pain Rating: (0-10 scale) 3/10   Pain altered Tx:  [x] No  [] Yes  Action:  Comments: Patient reports low back and left hip is doing fair today. 10 min late    Objective:  Modalities:   Precautions:  Exercises: Bolded completed this date  Exercise Reps/ Time Weight/ Level Comments   Nustep 5 min           Standing      Gastroc stretch w/ wedge 3x30\"  Added    Heel raises 2x10x  Added    Heel-toe 10x  Added    Haile DF stretch  3x30\" 8\" Added    3 way hip  10x ea   Bilateral         Leg press 3x10x 20 lb          supine   To complete at home this date.     marches 2x10     Lower trunk rotation 2x10     Small range SLR 5x     PPT 2x10     Iso abs 10x     Iso abs marches 2x10           seated      Heel-to-toe 2x10     Rockerboard  2x10 Lvl 1          SB rollouts flex, LTR 10x5\"     Forward touching floor 10x ea     HS stretch w/ stool 5x15\"     4-way ankle w/ stool 15x ea  No resistance with DF         Gait training 80ftx1  Verbal cues to heel strike          Other:         Treatment Charges: Mins Units   []  Modalities     [x]  Ther Exercise 40 3   []  Manual Therapy     []  Ther Activities     []  Aquatics     []  Vasocompression     [x]  Other  Gait      Total Treatment time 40 3       Assessment: [x] Progressing toward goals. Patient still demonstrates significant L LE strength. [] No change. [] Other:  [x] Patient would continue to benefit from skilled physical therapy services in order to: improve left LE strength, trunk stability, and decrease fall risk. STG: (to be met in 10 treatments)  ? Pain: 4/10 low back pain with ADLs. ? ROM:Patient is able to bend forward and reach past her knees to improve lifting. ? Strength: left dorsiflexion to 4-/5 to improve foot drop. ? Function:Oswestry score of 30% impaired or better to improve ADLs. Independent with Home Exercise Programs  Patient reports no falls x 1 month. TUG score of 15 seconds or better to decreased fall risk. Patient goals: Restore lumbar function, improve gait, stop falling. Pt. Education:  [x] Yes  [] No  [x] Reviewed Prior HEP/Ed  Method of Education: [x] Verbal  [x] Demo  [] Written  Comprehension of Education:  [] Verbalizes understanding. [] Demonstrates understanding. [] Needs review. [x] Demonstrates/verbalizes HEP/Ed previously given. Plan: [x] Continue current frequency toward long and short term goals. Adjusted goals to reflect insurance limitations.    [x] Specific Instructions for subsequent treatments: General core strengthening, DF strengthening, balance and gait, floor to standing transfers when able, issue 4-way ankle and Tband HEP Frequency:  2 x/week for 12 visits      Time In:  525 pm         Time Out: 605 pm    Electronically signed by:  Narendra Apodaca PT

## 2022-10-04 ENCOUNTER — HOSPITAL ENCOUNTER (OUTPATIENT)
Dept: PHYSICAL THERAPY | Age: 62
Setting detail: THERAPIES SERIES
Discharge: HOME | End: 2022-10-04
Payer: COMMERCIAL

## 2022-10-04 PROCEDURE — 97116 GAIT TRAINING THERAPY: CPT

## 2022-10-04 PROCEDURE — 97110 THERAPEUTIC EXERCISES: CPT
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
## 2022-10-05 ENCOUNTER — HOSPITAL ENCOUNTER (OUTPATIENT)
Dept: PHYSICAL THERAPY | Age: 62
Setting detail: THERAPIES SERIES
Discharge: HOME | End: 2022-10-05
Payer: COMMERCIAL

## 2022-10-05 PROCEDURE — 97110 THERAPEUTIC EXERCISES: CPT

## 2022-10-05 NOTE — FLOWSHEET NOTE
[x] [address]  - [facility] TWELVE-STEP [facility] CENTER &  Therapy  [address]  [phone]  F: [phone] [] [address]  [facility]   [address]  P: [phone]  F: [phone] [] [address]  [address]  P: [phone]  F: [phone] [] [address]  P: [phone]  F: [phone] [] [address]  [facility]   [address]   [place]: [phone]  F: [phone]      Physical Therapy Daily Treatment Note    Date:  10/5/2022  Patient Name:  Dejon Field    :  1960  MRN: [id number]  Physician:Chapo Gupta, APRN - CNP               Insurance: [place]   Approval was received for 10 visits, from 22 to 22. Authorization number [id number]. Medical Diagnosis: Chronic left-sided low back pain without sciatica M54.50         Rehab Codes: M54.59  Onset Date: 22                                 Next 's appt: 10/17/22 PCP, Neurosurg 22  Visit# / total visits: 6/10     Cancels/No Shows: 0/0    Subjective:    Pain:  [x] Yes  [] No Location: L hip and groin area, R lumbar spine near PSIS  Pain Rating: (0-10 scale) 4/10 irritability in lower back  Pain altered Tx:  [x] No  [] Yes  Action:  Comments: Patient is compliant with HEP exercises and feels physical therapy is beneficial to improve DF AROM.     Objective:  Modalities:   Precautions:  Exercises: Bolded completed this date  Exercise Reps/ Time Weight/ Level Comments   Nustep 5 min           Standing      Gastroc stretch w/ wedge 3x30\"     Heel raises 3x10  Increased reps 10/5   Heel-toe 2x10  Increased reps 10/5   Haile DF stretch  3x30\" 8\"    3 way hip  10x ea   Bilateral   Haile step-ups 10x 4\" Added 10/5         Leg press 3x10 20 lb Increased weight to 30 on 2nd and 3rd sets         supine   To complete at home this date. marches 2x10     Lower trunk rotation 2x10     Small range SLR 5x     PPT 2x10     Iso abs 10x     Iso abs marches 2x10           seated      Heel-to-toe 2x10     Rockerboard  2x10 Lvl 1          SB rollouts flex, LTR 10x5\"     Forward touching floor 2x10 ea  Increased reps 10/5   HS stretch w/ stool 5x15\"     4-way ankle w/ stool 15x ea  No resistance with DF         Gait training 80ftx2  Verbal cues to heel strike          Other:         Treatment Charges: Mins Units   []  Modalities     [x]  Ther Exercise 50 3   []  Manual Therapy     []  Ther Activities     []  Aquatics     []  Vasocompression     []  Other  Gait      Total Treatment time 50 3       Assessment: [x] Progressing toward goals. Added bilateral step-ups this date to improve DF AROM and hip flexor strength tolerated well with no onset of pain or sx. Attempted to complete 3-way hip standing exercises with resistance, however, patient expressed feeling uncomfortable completing due to poor balance. Patient progressed leg press weight this date with no onset of pain or fatigue. Patient demonstrates improved AROM DF throughout tx and ambulation. [] No change. [] Other:  [x] Patient would continue to benefit from skilled physical therapy services in order to: improve left LE strength, trunk stability, and decrease fall risk. STG: (to be met in 10 treatments)  ? Pain: 4/10 low back pain with ADLs. ? ROM:Patient is able to bend forward and reach past her knees to improve lifting. ? Strength: left dorsiflexion to 4-/5 to improve foot drop. ? Function:Oswestry score of 30% impaired or better to improve ADLs. Independent with Home Exercise Programs  Patient reports no falls x 1 month. TUG score of 15 seconds or better to decreased fall risk. Patient goals: Restore lumbar function, improve gait, stop falling.        Pt. Education:  [x] Yes  [] No  [x] Reviewed Prior HEP/Ed  Method of Education: [x] Verbal  [x] Demo  [] Written  Comprehension of Education:  [] Verbalizes understanding. [] Demonstrates understanding. [] Needs review. [x] Demonstrates/verbalizes HEP/Ed previously given. HEP 10/4: 4-way ankle and blue Tband     Plan: [x] Continue current frequency toward long and short term goals. [x] Specific Instructions for subsequent treatments: General core strengthening, DF strengthening, balance and gait, floor to standing transfers when able, step-ups on foam, challenge balance when able    Frequency:  2 x/week for 12 visits      Time In:  525 pm         Time Out: 620 pm    Electronically signed by:  Noel Trujillo    Patient treated and note written by Nikita Jacobsen, Student Physical Therapist Assistant under supervision of Car Serrato PTA.

## 2022-10-11 ENCOUNTER — HOSPITAL ENCOUNTER (OUTPATIENT)
Dept: PHYSICAL THERAPY | Age: 62
Setting detail: THERAPIES SERIES
Discharge: HOME | End: 2022-10-11
Payer: COMMERCIAL

## 2022-10-11 PROCEDURE — 97110 THERAPEUTIC EXERCISES: CPT

## 2022-10-11 NOTE — FLOWSHEET NOTE
[x] [facility]) [facility] CENTER &  Therapy  [address]  [phone]  F: [phone] [] [address]  [address]   [address]  P: [phone]  F: [phone] [] [address]  [address]  P: [phone]  F: [phone] [] [address]  P: [phone]  F: [phone] [] [address]  [facility]   [address]   [place]: [phone]  F: [phone]      Physical Therapy Daily Treatment Note    Date:  10/11/2022  Patient Name:  Stefano Cook    :  1960  MRN: [id number]  Physician:FAITH Fowler - JAMIE               Insurance: [place]   Approval was received for 10 visits, from 22 to 22. Authorization number [id number]. Medical Diagnosis: Chronic left-sided low back pain without sciatica M54.50         Rehab Codes: M54.59  Onset Date: 22                                 Next 's appt: 10/17/22 PCP, Neurosurg 22  Visit# / total visits: 7/10     Cancels/No Shows: 0/0    Subjective:    Pain:  [x] Yes  [] No Location: L hip and groin area, R lumbar spine near PSIS  Pain Rating: (0-10 scale) 4/10 irritability in lower back  Pain altered Tx:  [x] No  [] Yes  Action:  Comments: Patient is notes some improvement in her hip pain and her sitting tolerance.      Objective:  Modalities:   Precautions:  Exercises: Bolded completed this date  Exercise Reps/ Time Weight/ Level Comments   Nustep 5 min L4           Standing      Gastroc stretch w/ wedge 3x30\"     Heel raises 3x10  Increased reps 10/5   Heel-toe 2x10  Increased reps 10/5   Haile DF stretch  3x30\" 8\"    3 way hip  15x ea   Bilateral   Haile step-ups 10x 4\" Added 10/5         Leg press 3x10 20 lb Increased weight to 30 on 2nd and 3rd sets   Tandem standing    add   Balance foam    addd         supine   To complete at home this date. marches 2x10     Lower trunk rotation 2x10     Small range SLR 5x     PPT 2x10     Iso abs 10x     Iso abs marches 2x10           seated      Heel-to-toe 2x10     Rockerboard  2x10 Lvl 1          SB rollouts flex, LTR 10x5\"     Forward touching floor 2x10 ea  Increased reps 10/5   HS stretch w/ stool 5x15\"     4-way ankle w/ stool 15x ea  No resistance with DF         Gait training 80ftx2  Verbal cues to heel strike          Other:         Treatment Charges: Mins Units   []  Modalities     [x]  Ther Exercise 45 3   []  Manual Therapy     []  Ther Activities     []  Aquatics     []  Vasocompression     []  Other  Gait      Total Treatment time 45 3       Assessment: [x] Progressing toward goals. Patient demonstrates improved tolerance to exercise but her overall endurance and LE strength remains limited. Will continue to progress strengthening and balance exercises per patient tolerance. [] No change. [] Other:  [x] Patient would continue to benefit from skilled physical therapy services in order to: improve left LE strength, trunk stability, and decrease fall risk. STG: (to be met in 10 treatments)  ? Pain: 4/10 low back pain with ADLs. ? ROM:Patient is able to bend forward and reach past her knees to improve lifting. ? Strength: left dorsiflexion to 4-/5 to improve foot drop. ? Function:Oswestry score of 30% impaired or better to improve ADLs. Independent with Home Exercise Programs  Patient reports no falls x 1 month. TUG score of 15 seconds or better to decreased fall risk. Patient goals: Restore lumbar function, improve gait, stop falling. Pt. Education:  [x] Yes  [] No  [x] Reviewed Prior HEP/Ed  Method of Education: [x] Verbal  [x] Demo  [] Written  Comprehension of Education:  [] Verbalizes understanding. [] Demonstrates understanding. [] Needs review.   [x] Demonstrates/verbalizes HEP/Ed previously given. HEP 10/4: 4-way ankle and blue Tband     Plan: [x] Continue current frequency toward long and short term goals.   [x] Specific Instructions for subsequent treatments: General core strengthening, DF strengthening, balance and gait, floor to standing transfers when able, step-ups on foam, challenge balance when able    Frequency:  2 x/week for 12 visits      Time In:  5:10 pm         Time Out: 5:55 pm    Electronically signed by:  Carin Merlin, PT

## 2022-10-12 ENCOUNTER — HOSPITAL ENCOUNTER (OUTPATIENT)
Dept: PHYSICAL THERAPY | Age: 62
Setting detail: THERAPIES SERIES
Discharge: HOME | End: 2022-10-12
Payer: COMMERCIAL

## 2022-10-12 PROCEDURE — 97116 GAIT TRAINING THERAPY: CPT

## 2022-10-12 PROCEDURE — 97110 THERAPEUTIC EXERCISES: CPT

## 2022-10-12 NOTE — FLOWSHEET NOTE
[x] [facility] CENTER &  Therapy  [address]  [phone]  F: [phone] [] [address]  [place] [address]   [address]  P: [phone]  F: [phone] [] [address]  [address]  P: [phone]  F: [phone] [] [address]  P: [phone]  F: [phone] [] [address]  [facility]   [address]   [place]: [phone]  F: [phone]      Physical Therapy Daily Treatment Note    Date:  10/12/2022  Patient Name:  Edwin Escalera    :  1960  MRN: [id number]  Physician:Chapo Hair, APRN - CNP               Insurance: [place]   Approval was received for 10 visits, from 22 to 22. Authorization number [id number]. Medical Diagnosis: Chronic left-sided low back pain without sciatica M54.50         Rehab Codes: M54.59  Onset Date: 22                                 Next 's appt: 10/17/22 PCP, Neurosurg 22  Visit# / total visits: 8/10     Cancels/No Shows: 0/0    Subjective:    Pain:  [] Yes  [x] No Location: L hip and groin area, R lumbar spine near PSIS  Pain Rating: (0-10 scale) 0/10   Pain altered Tx:  [x] No  [] Yes  Action:  Comments: Patient reports no pain numerical this date.     Objective:  Modalities:   Precautions:  Exercises: Bolded completed this date  Exercise Reps/ Time Weight/ Level Comments   Nustep 5 min L4           Standing      Gastroc stretch w/ wedge 3x30\"     Heel raises 3x10     Heel-toe 2x10     Haile DF stretch  3x30\" 8\"    3 way hip  15x ea   Bilateral   Haile step-ups 10x 6\" Increased step height 10/12  Added 10/5         Leg press 3x10 20 lb Increased weight to 30 on 2nd and 3rd sets   Tandem standing  30\"  Verbal cue to avoid UE support  Added 10/12 of 15 seconds or better to decreased fall risk. Patient goals: Restore lumbar function, improve gait, stop falling. Pt. Education:  [x] Yes  [] No  [x] Reviewed Prior HEP/Ed  Method of Education: [x] Verbal  [x] Demo  [] Written  Comprehension of Education:  [x] Verbalizes understanding. [] Demonstrates understanding. [x] Needs review. [x] Demonstrates/verbalizes HEP/Ed previously given. HEP 10/4: 4-way ankle and blue Tband     Plan: [x] Continue current frequency toward long and short term goals. [x] Specific Instructions for subsequent treatments: General core strengthening, DF strengthening, balance and gait, floor to standing transfers when able, step-ups on foam, challenge balance when able, increase hold time for standing tandem and foam balance exercises    Frequency:  2 x/week for 12 visits      Time In:  5:20 pm         Time Out: 6:10 pm    Electronically signed by:  Jena Sin    Patient treated and note written by Eber Ram, Student Physical Therapist Assistant under supervision of Adrienne Valdes PTA.

## 2022-10-17 ENCOUNTER — OFFICE VISIT (OUTPATIENT)
Dept: FAMILY MEDICINE CLINIC | Age: 62
End: 2022-10-17
Payer: COMMERCIAL

## 2022-10-17 VITALS
SYSTOLIC BLOOD PRESSURE: 128 MMHG | HEIGHT: 65 IN | TEMPERATURE: 98.1 F | RESPIRATION RATE: 16 BRPM | OXYGEN SATURATION: 98 % | DIASTOLIC BLOOD PRESSURE: 84 MMHG | HEART RATE: 75 BPM | WEIGHT: 244 LBS | BODY MASS INDEX: 40.65 KG/M2

## 2022-10-17 DIAGNOSIS — E78.5 HYPERLIPIDEMIA, UNSPECIFIED HYPERLIPIDEMIA TYPE: ICD-10-CM

## 2022-10-17 DIAGNOSIS — E11.9 TYPE 2 DIABETES MELLITUS WITHOUT COMPLICATION, WITHOUT LONG-TERM CURRENT USE OF INSULIN (HCC): Primary | ICD-10-CM

## 2022-10-17 DIAGNOSIS — G89.29 CHRONIC MIDLINE LOW BACK PAIN, UNSPECIFIED WHETHER SCIATICA PRESENT: ICD-10-CM

## 2022-10-17 DIAGNOSIS — E03.9 HYPOTHYROIDISM, UNSPECIFIED TYPE: ICD-10-CM

## 2022-10-17 DIAGNOSIS — M54.50 CHRONIC MIDLINE LOW BACK PAIN, UNSPECIFIED WHETHER SCIATICA PRESENT: ICD-10-CM

## 2022-10-17 LAB — HBA1C MFR BLD: 8.6 %

## 2022-10-17 PROCEDURE — 3052F HG A1C>EQUAL 8.0%<EQUAL 9.0%: CPT | Performed by: NURSE PRACTITIONER

## 2022-10-17 PROCEDURE — 83036 HEMOGLOBIN GLYCOSYLATED A1C: CPT | Performed by: NURSE PRACTITIONER

## 2022-10-17 PROCEDURE — 99213 OFFICE O/P EST LOW 20 MIN: CPT | Performed by: NURSE PRACTITIONER

## 2022-10-17 RX ORDER — SITAGLIPTIN AND METFORMIN HYDROCHLORIDE 1000; 50 MG/1; MG/1
1 TABLET, FILM COATED, EXTENDED RELEASE ORAL DAILY
Qty: 90 TABLET | Refills: 1 | Status: SHIPPED | OUTPATIENT
Start: 2022-10-17

## 2022-10-17 RX ORDER — GABAPENTIN 100 MG/1
100 CAPSULE ORAL 3 TIMES DAILY
Qty: 270 CAPSULE | Refills: 1 | Status: SHIPPED | OUTPATIENT
Start: 2022-10-17 | End: 2023-04-15

## 2022-10-17 ASSESSMENT — ENCOUNTER SYMPTOMS
COUGH: 0
NAUSEA: 0
SHORTNESS OF BREATH: 0
VOMITING: 0
SORE THROAT: 0
ABDOMINAL PAIN: 0
SINUS PAIN: 0
DIARRHEA: 0
BACK PAIN: 1

## 2022-10-17 NOTE — PROGRESS NOTES
[address] [address] [facility]  [address]  [address] [address]  Dept: [phone]  Dept Fax: [phone]    Joshua Land is a 57 y.o. female who presents today for her medicalconditions/complaints as noted below. Joshua Land is c/o of Diabetes      HPI:     65-year-old female patient presents with complaints of follow-up. Abdominal pain, ongoing. Seeing GI. Had colonoscopy and egd showing mild gastritis. Improving. Ongoing left-sided lower back pain. Had recent MRI showing several areas of disc bulging. Has been treating with Mobic and Tylenol without complete resolution. Used to take a muscle relaxant with partial benefits. Referral to pain management placed. Wants to see neurosurgery who also referred to pain clinic, recommended pt and trialing gabapentin 100 in am and 200 in pm, needs refilled. Also requesting handicap placard. Type 2 diabetes last A1c 7.6 to 8.6 at last check. Discontinued Janumet and actos on her own as she thinks this was causing her GI distress. Has not seen much elevation in blood sugar. Taking berberine and other supplements. Resuming Janumet 1000-50     Currently on NP thyroid 60, last TSH normal, hx of thyroid nodule last ultrasound normal.       Past Medical History:   Diagnosis Date    Diabetes mellitus ([address]     Hypothyroidism         Current Outpatient Medications   Medication Sig Dispense Refill    Handicap Placard MISC by Does not apply route Duration: Lifetime 1 each 0    gabapentin (NEURONTIN) 100 MG capsule Take 1 capsule by mouth 3 times daily for 180 days.  Intended supply: 90 days 270 capsule 1    SITagliptin-metFORMIN (JANUMET XR)  MG TB24 per extended release tablet Take 1 tablet by mouth daily 90 tablet 1    Barberry-Oreg Grape-Goldenseal (BERBERINE COMPLEX PO) Take by mouth      NONFORMULARY Glucocare      meloxicam (MOBIC) 15 MG tablet TAKE ONE TABLET BY MOUTH DAILY 30 tablet 5 NP THYROID 60 MG tablet TAKE ONE TABLET BY MOUTH DAILY 30 tablet 5    Cyanocobalamin (VITAMIN B 12 PO) Take by mouth      OIL OF OREGANO PO Take by mouth      Chromium Picolinate 200 MCG CAPS Take by mouth      Cholecalciferol (VITAMIN D-3) 25 MCG (1000 UT) CAPS Take by mouth      Cranberry 1000 MG CAPS Take by mouth       No current facility-administered medications for this visit. Allergies   Allergen Reactions    Insulins      Feels like she is having a stroke      Sulfa Antibiotics Hives       Subjective:      Review of Systems   Constitutional:  Negative for chills and fever. HENT:  Negative for ear pain, sinus pain and sore throat. Respiratory:  Negative for cough and shortness of breath. Cardiovascular:  Negative for chest pain and palpitations. Gastrointestinal:  Negative for abdominal pain, diarrhea, nausea and vomiting. Musculoskeletal:  Positive for back pain. Neurological:  Negative for dizziness and headaches. All other systems reviewed and are negative.    :Objective     Physical Exam  Vitals and nursing note reviewed. Constitutional:       Appearance: Normal appearance. Cardiovascular:      Rate and Rhythm: Normal rate. Pulmonary:      Effort: Pulmonary effort is normal.      Breath sounds: Normal breath sounds. Skin:     General: Skin is warm and dry. Neurological:      General: No focal deficit present. Mental Status: She is alert and oriented to person, place, and time.      /84 (Site: Right Upper Arm, Position: Sitting, Cuff Size: Medium Adult)   Pulse 75   Temp 98.1 °F (36.7 °C) (Tympanic)   Resp 16   Ht 5' 5\" (1.651 m)   Wt 244 lb (110.7 kg)   SpO2 98%   BMI 40.60 kg/m²     Lab Review   Office Visit on 07/11/2022   Component Date Value    Hemoglobin A1C 07/11/2022 7.6     Microalb, Ur 07/11/2022 80     Creatinine Ur POCT 07/11/2022 100     Microalbumin Creatinine * 07/11/2022     Admission on 04/22/2022, Discharged on 04/22/2022   Component Date Value    POC Glucose 04/22/2022 184 (A)     Surgical Pathology Report 04/22/2022                      Value:-- Diagnosis --  A. SMALL BOWEL, BIOPSY:  -DUODENAL MUCOSA WITH A NORMAL VILLOUS ARCHITECTURE AND NO INCREASE IN  INTRAEPITHELIAL LYMPHOCYTES. B.  STOMACH, BIOPSY:  -GASTRIC ANTRAL AND BODY TYPE MUCOSA WITH PATCHY MINIMAL TO MILD  CHRONIC GASTRITIS. C.  COLON, RANDOM BIOPSY:  -UNREMARKABLE COLONIC MUCOSA WITH NO SIGNIFICANT INFLAMMATION AND NO  FEATURES OF MICROSCOPIC COLITIS. Sanju Sorto M.D.  **Electronically Signed Out**         des/4/26/2022       Clinical Information  Pre-op Diagnosis:  ABD PAIN   Operative Findings:  BIOPSY OF SMALL BOWEL RULE OUT CELIAC; BIOPSY  STOMACH; RANDOM COLON BIOPSIES RULE OUT MICROSCOPIC COLITIS  Operation Performed:  COLONOSCOPY WITH BIOPSY, EGD BIOPSY     Source of Specimen  A: BIOPSY OF SMALL BOWEL  B: BIOPSY STOMACH  C: RANDOM COLON BIOPSIES    Gross Description  A. \"KELVIN EDGAR, BIOPSY OF SMALL BOWEL\" Three tan-white tissue  fragments from 0.1 to 0.3 cm and are 0.4 x 0.4 x 0.2 cm in aggregate. Entirely 1cs. B. \"KELVIN BIDW                          ELL, STOMACH BIOPSY\" Four tan-white tissue fragments  from 0.1 to 0.4 cm and are 0.6 x 0.4 x 0.2 cm in aggregate. Entirely  1cs. C. \"KELVIN EDGAR, RANDOM COLON BIOPSIES\" Multiple tan-white tissue  fragments from 0.1 to 0.3 cm and are 1.0 x 0.6 x 0.2 cm in aggregate. Entirely 1cs. mpb tm       Microscopic Description  A-C. Microscopic examination performed. SURGICAL PATHOLOGY CONSULTATION       Patient Name: Abdoulaye Carl  Med Rec: [id number]  Path Number: [id number]    [facility], [address]  [phone]  Fax: [phone]         Assessment and Plan      1.  Type 2 diabetes mellitus without complication, without long-term current use of insulin (HCC)  -     POCT glycosylated hemoglobin (Hb A1C)  -     CBC with Auto Differential; Future  -     Comprehensive Metabolic Panel; Future  -     SITagliptin-metFORMIN (JANUMET XR)  MG TB24 per extended release tablet; Take 1 tablet by mouth daily, Disp-90 tablet, R-1Normal  2. Chronic midline low back pain, unspecified whether sciatica present  -     Handicap Placard MISC; Starting Mon 10/17/2022, Disp-1 each, R-0, PrintDuration: Lifetime  -     CBC with Auto Differential; Future  -     Comprehensive Metabolic Panel; Future  -     gabapentin (NEURONTIN) 100 MG capsule; Take 1 capsule by mouth 3 times daily for 180 days. Intended supply: 90 days, Disp-270 capsule, R-1Normal  3. Hypothyroidism, unspecified type  -     CBC with Auto Differential; Future  -     Comprehensive Metabolic Panel; Future  -     TSH With Reflex Ft4; Future  4. Hyperlipidemia, unspecified hyperlipidemia type  -     CBC with Auto Differential; Future  -     Comprehensive Metabolic Panel; Future  -     Lipid Panel; Future       Labs ordered  Gabapentin refilled  Resume janumet  Med check 3 months, sooner prn          Results for orders placed or performed in visit on 10/17/22   POCT glycosylated hemoglobin (Hb A1C)   Result Value Ref Range    Hemoglobin A1C 8.6 %             Return in about 3 months (around 1/17/2023), or if symptoms worsen or fail to improve. Orders Placed This Encounter   Medications    Handicap Placard MISC     Sig: by Does not apply route Duration: Lifetime     Dispense:  1 each     Refill:  0    gabapentin (NEURONTIN) 100 MG capsule     Sig: Take 1 capsule by mouth 3 times daily for 180 days. Intended supply: 90 days     Dispense:  270 capsule     Refill:  1    SITagliptin-metFORMIN (JANUMET XR)  MG TB24 per extended release tablet     Sig: Take 1 tablet by mouth daily     Dispense:  90 tablet     Refill:  1        Patient given educational materials - see patient instructions. Discussed use, benefit, and side effects of prescribed medications. All patientquestions answered. Pt voiced understanding. Patient given educational materials - see patient instructions. Discussed use, benefit, and side effects of prescribed medications. All patientquestions answered. Pt voiced understanding. This note was transcribed using dictation with Dragon services. Efforts were made to correct any errors but some words may be misinterpreted.     Patient assumes risks associated with failure to complete recommended testing and treatments in a timely manner    Electronically signed by FAITH Fam CNP on 10/17/2022at 1:21 PM

## 2022-10-17 NOTE — PATIENT INSTRUCTIONS
Patient Education        Learning About Type 2 Diabetes  What is type 2 diabetes? Type 2 diabetes is a condition in which you have too much sugar (glucose) in your blood. Glucose is a type of sugar produced in your body when carbohydrates and other foods are digested. It provides energy to cells throughout the body. Normally, blood sugar levels increase after you eat a meal. When blood sugar rises, cells in the pancreas release insulin, which causes the body to absorb sugar from the blood and lowers the blood sugar level to normal.  When you have type 2 diabetes, sugar stays in the blood rather than entering the body's cells to be used for energy. This results in high blood sugar. It happens when your body can't use insulin the right way. Over time, high blood sugar can harm many parts of the body, such as your eyes, heart, blood vessels, nerves, and kidneys. It can also increase your risk for other health problems (complications). What can you expect with type 2 diabetes? Yuly Little keep hearing about how important it is to keep your blood sugar within a target range. That's because over time, high blood sugar can lead to serious problems. It can:  Harm your eyes, nerves, and kidneys. Damage your blood vessels, leading to heart disease and stroke. Reduce blood flow and cause nerve damage to parts of your body, especially your feet. This can cause slow healing and pain when you walk. Make your immune system weak and less able to fight infections. When people hear the word \"diabetes,\" they often think of problems like these. But daily care and treatment can help prevent or delay these problems. The goal is to keep your blood sugar in a target range. That's the best way to reduce your chance of having more problems from diabetes. What are the symptoms? Some people who have type 2 diabetes may not have any symptoms early on.  Many people with the disease don't even know they have it at first. But with time, diabetes starts to cause symptoms. You have most symptoms of type 2 diabetes when your blood sugar is either too high or too low. The most common symptoms of high blood sugar include: Thirst.  Needing to urinate often. Weight loss. Blurry vision. The symptoms of low blood sugar include:  Sweating. Shakiness. Weakness. Hunger. Confusion. You're not likely to get symptoms of low blood sugar unless you take insulin or use certain diabetes medicines that lower blood sugar. How can you help prevent type 2 diabetes? There are things you can do to help prevent type 2 diabetes. Stay at a healthy weight. Exercise regularly, and eat healthy foods. Even small changes can make a difference. If you have prediabetes, the medicine metformin can help prevent type 2 diabetes. How is type 2 diabetes treated? Treatment for type 2 diabetes will change over time to meet your needs. But the focus of your treatment will usually be to keep your blood sugar levels in your target range. This will help prevent problems such as eye, kidney, heart, blood vessel, and nerve disease. Some people may need medicines to help their bodies make insulin or decrease insulin resistance. Some medicines slow down how quickly the body absorbs carbohydrates. Treatment to manage type 2 diabetes includes:  Making healthy food choices and being active. Losing weight, if you need to. Seeing your doctor regularly. Keeping your blood sugar in your target range. Taking medicines, if you need them. Quitting smoking, if you smoke. Keeping your blood pressure and cholesterol under control. Follow-up care is a key part of your treatment and safety. Be sure to make and go to all appointments, and call your doctor if you are having problems. It's also a good idea to know your test results and keep a list of the medicines you take. Where can you learn more? Go to [email or web address]://[email or web address] org and sign in to your [facility] account.  Enter C205 in the [facility] box to learn more about \"Learning About Type 2 Diabetes. \"     If you do not have an account, please click on the \"Sign Up Now\" link. Current as of: April 13, 2022               Content Version: 13.4  © 3376-3856 Healthwise, Incorporated. Care instructions adapted under license by [facility] ([facility]). If you have questions about a medical condition or this instruction, always ask your healthcare professional. [address] any warranty or liability for your use of this information.

## 2022-10-18 ENCOUNTER — HOSPITAL ENCOUNTER (OUTPATIENT)
Dept: PHYSICAL THERAPY | Age: 62
Setting detail: THERAPIES SERIES
Discharge: HOME | End: 2022-10-18
Payer: COMMERCIAL

## 2022-10-18 PROCEDURE — 97110 THERAPEUTIC EXERCISES: CPT

## 2022-10-18 NOTE — FLOWSHEET NOTE
[x] [facility]) [facility]  [address]  [phone]  F: [phone] [] [address]  [address]   [address]  P: [phone]  F: [phone] [] [address]  [address]  P: [phone]  F: [phone] [] [address]  P: [phone]  F: [phone] [] [address]  [facility]   [address]   [place]: [phone]  F: [phone]      Physical Therapy Daily Treatment Note    Date:  10/18/2022  Patient Name:  Dandre David    :  1960  MRN: [id number]  Physician:Chapo Guerra, FAITH - CNP               Insurance: [employer]   Approval was received for 10 visits, from 22 to 22. Authorization number [id number]. Medical Diagnosis: Chronic left-sided low back pain without sciatica M54.50         Rehab Codes: M54.59  Onset Date: 22                                 Next 's appt: 10/17/22 PCP, Neurosurg 22  Visit# / total visits: 9/10     Cancels/No Shows: 0/0    Subjective:    Pain:  [] Yes  [x] No Location: L hip and groin area, R lumbar spine near PSIS  Pain Rating: (0-10 scale) 0/10   Pain altered Tx:  [x] No  [] Yes  Action:  Comments: Patient reports no pain numerical this date.      Objective:  Modalities:   Precautions:  Exercises: Bolded completed this date  Exercise Reps/ Time Weight/ Level Comments   Nustep 5 min L5  Increased resistance 10/18         Standing      Gastroc stretch w/ wedge 3x30\"     Heel raises 3x10     Heel-toe 3x10  Increased reps 10/18   Haile DF stretch  3x30\" 8\"    3 way hip  15x ea  1lb Bilateral  Added weight 10/18   Haile step-ups 15x 6\" Increased reps 10/18         Leg press 3x10 30 lb    Tandem standing  1 min  Verbal cue to avoid UE support   Balance foam  1 min  Verbal cue to avoid UE support         supine   To complete at home this date. marches 2x10     Lower trunk rotation 2x10     Small range SLR 5x     PPT 2x10     Iso abs 10x     Iso abs marches 2x10           seated      Heel-to-toe 2x10     Rockerboard  2x10 Lvl 1          SB rollouts flex, LTR 10x5\"     Forward touching floor 2x10 ea     HS stretch w/ stool 5x15\"     4-way ankle w/ stool 15x ea  No resistance with DF         Gait training 80ftx2  Verbal cues to heel strike   Added oval blue foam to step over and ball to  from floor during 2nd lap 10/12         Other:         Treatment Charges: Mins Units   []  Modalities     [x]  Ther Exercise 50 3   []  Manual Therapy     []  Ther Activities     []  Aquatics     []  Vasocompression     []  Other  Gait      Total Treatment time 50 3       Assessment: [x] Progressing toward goals. Met and progressing toward STGs this date with most limiting factor of pain in lower back. Patient complains of pain in lower back into L hip during 3 way hip exercise. Increased reps during bilateral step-ups and heel-toe exercises with no onset of pain or sx. Increased hold time during tandem standing and balance on foam with moderate difficulty to maintain balance and required fingertip touch. [] No change. [] Other:  [x] Patient would continue to benefit from skilled physical therapy services in order to: improve left LE strength, trunk stability, and decrease fall risk. STG: (to be met in 10 treatments)  ? Pain: 4/10 low back pain with ADLs. --PROGRESS, at most 8/10 with lifting heavy things  ? ROM:Patient is able to bend forward and reach past her knees to improve lifting. --MET 10/18  ? Strength: left dorsiflexion to 4-/5 to improve foot drop. ?  Function:Oswestry score of 30% impaired or better to improve ADLs. --NOT MET 60% impaired  Independent with Home Exercise Programs --MET 10/18  Patient reports no falls x 1 month. --PROGRESS, no fall reported since 10/3  TUG score of 15 seconds or better to decreased fall risk. Patient goals: Restore lumbar function, improve gait, stop falling. Pt. Education:  [x] Yes  [] No  [x] Reviewed Prior HEP/Ed  Method of Education: [x] Verbal  [x] Demo  [] Written  Comprehension of Education:  [x] Verbalizes understanding. [x] Demonstrates understanding. [] Needs review. [x] Demonstrates/verbalizes HEP/Ed previously given. HEP 10/4: 4-way ankle and blue Tband     Plan: [x] Continue current frequency toward long and short term goals. [x] Specific Instructions for subsequent treatments: General core strengthening, DF strengthening, balance and gait, floor to standing transfers when able, step-ups on foam, challenge balance when able, finish STGs    Frequency:  2 x/week for 12 visits      Time In:  5:20 pm         Time Out: 6:15 pm    Electronically signed by:  Reema Mukherjee    Patient treated and note written by Aggie Dumas, Student Physical Therapist Assistant under supervision of Ghada Love PTA.

## 2022-10-20 ENCOUNTER — HOSPITAL ENCOUNTER (OUTPATIENT)
Dept: PHYSICAL THERAPY | Age: 62
Setting detail: THERAPIES SERIES
Discharge: HOME | End: 2022-10-20
Payer: COMMERCIAL

## 2022-10-20 ENCOUNTER — APPOINTMENT (OUTPATIENT)
Dept: PHYSICAL THERAPY | Age: 62
End: 2022-10-20
Payer: COMMERCIAL

## 2022-10-20 PROCEDURE — 97110 THERAPEUTIC EXERCISES: CPT

## 2022-10-20 NOTE — FLOWSHEET NOTE
[x] [facility]) [facility] CENTER &  Therapy  [address]  [phone]  F: [phone] [] [address]  [facility]   [address]  P: [phone]  F: [phone] [] [address]  [address]  P: [phone]  F: [phone] [] [address]  P: [phone]  F: [phone] [] [address]  [facility]   [address]   [place]: [phone]  F: [phone]      Physical Therapy Daily Treatment Note    Date:  10/20/2022  Patient Name:  Karely Rodriguez    :  1960  MRN: [id number]  Physician:FAITH Ramos - JAMIE               Insurance: [place]   Approval was received for 10 visits, from 22 to 22. Authorization number [id number]. Medical Diagnosis: Chronic left-sided low back pain without sciatica M54.50         Rehab Codes: M54.59  Onset Date: 22                                 Next 's appt: 10/17/22 PCP, Neurosurg 22  Visit# / total visits: 10/10     Cancels/No Shows: 0/0    Subjective:    Pain:  [] Yes  [x] No Location: L hip and groin area, R lumbar spine near PSIS  Pain Rating: (0-10 scale) 0/10   Pain altered Tx:  [x] No  [] Yes  Action:  Comments:  Patient arrives noting no pain this date.      Objective:  Modalities:   Precautions:  Exercises: Bolded completed this date  Exercise Reps/ Time Weight/ Level Comments   Nustep 5 min L5           Standing      Gastroc stretch w/ wedge 3x30\"     DF raises 15x  Pt leans against wall, tries to lift toes  Added 10/20   Heel raises 3x10     Heel-toe 3x10     Haile DF stretch  3x30\" 8\"    3 way hip  15x ea  1lb Bilateral   Lateral step ups 10x Foam Added  10/20   Step ups 10x ea Foam Added foam 10/20   SLS 3x10\"  Added 10/20         Leg press 3x10 30 lb Tandem standing  3x20\"     NBOS 3x20\"            supine   To complete at home this date. marches 2x10     Lower trunk rotation 2x10     Small range SLR 5x     PPT 2x10     Iso abs 10x     Iso abs marches 2x10           seated      Heel-to-toe 2x10     Rockerboard  2x10 Lvl 1          SB rollouts flex, LTR 10x5\"     Forward touching floor 2x10 ea     HS stretch w/ stool 5x15\"     4-way ankle w/ stool 15x ea Blue No resistance with DF         Gait training 80ftx2  Cueing for heel strike and decreased lateral lean         Other:         Treatment Charges: Mins Units   []  Modalities     [x]  Ther Exercise 50 3   []  Manual Therapy     []  Ther Activities     []  Aquatics     []  Vasocompression     []  Other  Gait      Total Treatment time 50 3       Assessment: [x] Progressing toward goals. TUG and L DF strength assessed by primary PT, see progress note same date. Progressed static and dynamic balance this date with addition of foam to tandem, NBOS balance, and step ups all with good tolerance. Verbal cues for min UE assist as needed with good carryover, however increased assist required during step ups. Patient demonstrates considerable lateral lean during SLS and gait training to contralateral side, verbal cues to reduce with good carryover rasheed following mirror feedback. [] No change. [] Other:  [x] Patient would continue to benefit from skilled physical therapy services in order to: improve left LE strength, trunk stability, and decrease fall risk. STG: (to be met in 10 treatments)  ? Pain: 4/10 low back pain with ADLs. --PROGRESS, at most 8/10 with lifting heavy things  ? ROM:Patient is able to bend forward and reach past her knees to improve lifting. --MET 10/18  ? Strength: left dorsiflexion to 4-/5 to improve foot drop. ?  Function:Oswestry score of 30% impaired or better to improve ADLs. --NOT MET 60% impaired  Independent with Home Exercise Programs --MET 10/18  Patient reports no falls x 1 month. --PROGRESS, no fall reported since 10/3  TUG score of 15 seconds or better to decreased fall risk. Patient goals: Restore lumbar function, improve gait, stop falling. Pt. Education:  [x] Yes  [] No  [x] Reviewed Prior HEP/Ed  Method of Education: [x] Verbal  [x] Demo  [] Written  Comprehension of Education:  [x] Verbalizes understanding. [x] Demonstrates understanding. [] Needs review. [x] Demonstrates/verbalizes HEP/Ed previously given. HEP 10/4: 4-way ankle and blue Tband     Plan: [x] Continue current frequency toward long and short term goals.   [x] Specific Instructions for subsequent treatments: increase leg press weight, obstacle course/navigation    Frequency:  2 x/week for 12 visits      Time In:  5:15 pm         Time Out: 6:10 pm    Electronically signed by:  Dana Medrano PTA

## 2022-10-20 NOTE — PROGRESS NOTES
[x] [address] [facility]ELVE[facility]EP [facility]  [address]  [phone]  F: [phone] [] [address]  [employer]   [address]  P: [phone]  F: [phone] [] [address]  [address] [facility] [address]  P: [phone]  F: [phone] [] [address]  P: [phone]  F: [phone] [] [address]  [facility]   [address]   [place]: [phone]  F: [phone]      Physical Therapy Progress Note    Date: 10/20/2022      Patient: Kym Boyle  : 1960  MRN: [id number]    Physician:FAITH Carreon CNP               Insurance: [employer]   Approval was received for 10 visits, from 22 to 22. Authorization number [id number]. Medical Diagnosis: Chronic left-sided low back pain without sciatica M54.50         Rehab Codes: M54.59  Onset Date: 22                                 Next 's appt: 10/17/22 PCP, Neurosurg 22  Visit# / total visits: 10/22                                 Cancels/No Shows: 0/0  Date range of services: 22 to 10/20/22    Subjective:    Pain:  [] Yes  [x] No   Location: L hip and groin area, R lumbar spine near PSIS   Pain Rating: (0-10 scale) 0/10   Pain altered Tx:  [x] No  [] Yes  Action:  Comments:  Patient arrives noting limited pain today but still has left leg weakness and difficulty ambulating. She does note her balance is improving with PT.        Objective:  Test Measurements:  Strength: Left dorsiflexion 4-/5   ROM: Able to flex trunk to 75% WFL  Function:   Oswestry score 60% impaired  TUG score: 15 seconds, Fall risk     Assessment:   Patient demonstrates improved left leg strength and gait speed.  She still requires a cane to ambulate community distances and she has balance deficits which put her at risk for future falls. Her back pain still fluctuates daily and does affect her independence. She does report pain improvement since starting physical therapy. Patient requires 12 additional physical therapy visits in order to further improve her left leg strength, balance, and prevent falling. STG: (to be met in 10 treatments)  ? Pain: 4/10 low back pain with ADLs. --PROGRESS, at most 8/10 with lifting heavy things  ? ROM:Patient is able to bend forward and reach past her knees to improve lifting. --MET   ? Strength: left dorsiflexion to 4-/5 to improve foot drop. MET  ? Function:Oswestry score of 30% impaired or better to improve ADLs. --NOT MET 60% impaired  Independent with Home Exercise Programs --MET 1  Patient reports no falls x 1 month. --PROGRESS, no fall reported since 10/3  TUG score of 15 seconds or better to decreased fall risk. MET    LTG to be met in 20 visits   1. TUG score less than 13.5 seconds to reduce fall risk. 2. 4/5 L ankle dorsiflexion to prevent foot drop and tripping while walking. 3. Patient is able to ambulate 300 ft without using a cane. Treatment Plan:  [x] Therapeutic Exercise   97610  [] Iontophoresis: 4 mg/mL Dexamethasone Sodium Phosphate  mAmin  61053   [x] Therapeutic Activity  79814 [] Vasopneumatic cold with compression  00802    [x] Gait Training   27253 [] Ultrasound   64667   [x] Neuromuscular Re-education  04835 [x] Electrical Stimulation Unattended  10171   [x] Manual Therapy  41135 [x] Electrical Stimulation Attended  50217   [x] Instruction in HEP  [] Lumbar/Cervical Traction  48116   [] Aquatic Therapy   17852 [] Cold/hotpack    [] Massage   78281      [] Dry Needling, 1 or 2 muscles  00924   [] Biofeedback, first 15 minutes   86563  [] Biofeedback, additional 15 minutes   25627 [] Dry Needling, 3 or more muscles  80808       Patient Status:     [] Continue per initial plan of care.     [x] Additional visits necessary. [x] Other: Requesting additional treatment from insurance. Requested Frequency/Duration: 2 times per week for 12 additional treatments. Electronically signed by Valentín Whatley PT on 10/20/2022 at 9:28 AM    If you have any questions or concerns, please don't hesitate to call.   Thank you for your referral.

## 2023-01-23 ENCOUNTER — OFFICE VISIT (OUTPATIENT)
Dept: FAMILY MEDICINE CLINIC | Age: 63
End: 2023-01-23
Payer: COMMERCIAL

## 2023-01-23 VITALS
DIASTOLIC BLOOD PRESSURE: 94 MMHG | HEART RATE: 90 BPM | BODY MASS INDEX: 41.48 KG/M2 | RESPIRATION RATE: 18 BRPM | TEMPERATURE: 97.7 F | OXYGEN SATURATION: 96 % | WEIGHT: 249 LBS | SYSTOLIC BLOOD PRESSURE: 140 MMHG | HEIGHT: 65 IN

## 2023-01-23 DIAGNOSIS — E11.9 TYPE 2 DIABETES MELLITUS WITHOUT COMPLICATION, WITHOUT LONG-TERM CURRENT USE OF INSULIN (HCC): Primary | ICD-10-CM

## 2023-01-23 DIAGNOSIS — M54.50 CHRONIC MIDLINE LOW BACK PAIN, UNSPECIFIED WHETHER SCIATICA PRESENT: ICD-10-CM

## 2023-01-23 DIAGNOSIS — G89.29 CHRONIC MIDLINE LOW BACK PAIN, UNSPECIFIED WHETHER SCIATICA PRESENT: ICD-10-CM

## 2023-01-23 DIAGNOSIS — E03.9 HYPOTHYROIDISM, UNSPECIFIED TYPE: ICD-10-CM

## 2023-01-23 DIAGNOSIS — E78.5 HYPERLIPIDEMIA, UNSPECIFIED HYPERLIPIDEMIA TYPE: ICD-10-CM

## 2023-01-23 LAB — HBA1C MFR BLD: 7.8 %

## 2023-01-23 PROCEDURE — 3051F HG A1C>EQUAL 7.0%<8.0%: CPT | Performed by: NURSE PRACTITIONER

## 2023-01-23 PROCEDURE — 99213 OFFICE O/P EST LOW 20 MIN: CPT | Performed by: NURSE PRACTITIONER

## 2023-01-23 PROCEDURE — 83036 HEMOGLOBIN GLYCOSYLATED A1C: CPT | Performed by: NURSE PRACTITIONER

## 2023-01-23 ASSESSMENT — ENCOUNTER SYMPTOMS
VOMITING: 0
NAUSEA: 0
BACK PAIN: 1
SINUS PAIN: 0
SORE THROAT: 0
ABDOMINAL PAIN: 0
COUGH: 0
SHORTNESS OF BREATH: 0
DIARRHEA: 1

## 2023-01-23 ASSESSMENT — PATIENT HEALTH QUESTIONNAIRE - PHQ9
SUM OF ALL RESPONSES TO PHQ QUESTIONS 1-9: 0
2. FEELING DOWN, DEPRESSED OR HOPELESS: 0
SUM OF ALL RESPONSES TO PHQ QUESTIONS 1-9: 0
1. LITTLE INTEREST OR PLEASURE IN DOING THINGS: 0
SUM OF ALL RESPONSES TO PHQ QUESTIONS 1-9: 0
SUM OF ALL RESPONSES TO PHQ QUESTIONS 1-9: 0
SUM OF ALL RESPONSES TO PHQ9 QUESTIONS 1 & 2: 0

## 2023-01-23 NOTE — PATIENT INSTRUCTIONS
Patient Education        Learning About Type 2 Diabetes  What is type 2 diabetes? Type 2 diabetes is a condition in which you have too much sugar (glucose) in your blood. Glucose is a type of sugar produced in your body when carbohydrates and other foods are digested. It provides energy to cells throughout the body. Normally, blood sugar levels increase after you eat a meal. When blood sugar rises, cells in the pancreas release insulin, which causes the body to absorb sugar from the blood and lowers the blood sugar level to normal.  When you have type 2 diabetes, sugar stays in the blood rather than entering the body's cells to be used for energy. This results in high blood sugar. It happens when your body can't use insulin the right way. Over time, high blood sugar can harm many parts of the body, such as your eyes, heart, blood vessels, nerves, and kidneys. It can also increase your risk for other health problems (complications). What can you expect with type 2 diabetes? Hesham Avitia keep hearing about how important it is to keep your blood sugar within a target range. That's because over time, high blood sugar can lead to serious problems. It can:  Harm your eyes, nerves, and kidneys. Damage your blood vessels, leading to heart disease and stroke. Reduce blood flow and cause nerve damage to parts of your body, especially your feet. This can cause slow healing and pain when you walk. Make your immune system weak and less able to fight infections. When people hear the word \"diabetes,\" they often think of problems like these. But daily care and treatment can help prevent or delay these problems. The goal is to keep your blood sugar in a target range. That's the best way to reduce your chance of having more problems from diabetes. What are the symptoms? Some people who have type 2 diabetes may not have any symptoms early on.  Many people with the disease don't even know they have it at first. But with time, diabetes starts to cause symptoms. You have most symptoms of type 2 diabetes when your blood sugar is either too high or too low. The most common symptoms of high blood sugar include: Thirst.  Needing to urinate often. Weight loss. Blurry vision. The symptoms of low blood sugar include:  Sweating. Shakiness. Weakness. Hunger. Confusion. You're not likely to get symptoms of low blood sugar unless you take insulin or use certain diabetes medicines that lower blood sugar. How can you help prevent type 2 diabetes? There are things you can do to help prevent type 2 diabetes. Stay at a healthy weight. Exercise regularly, and eat healthy foods. Even small changes can make a difference. If you have prediabetes, the medicine metformin can help prevent type 2 diabetes. How is type 2 diabetes treated? Treatment for type 2 diabetes will change over time to meet your needs. But the focus of your treatment will usually be to keep your blood sugar levels in your target range. This will help prevent problems such as eye, kidney, heart, blood vessel, and nerve disease. Some people may need medicines to help their bodies make insulin or decrease insulin resistance. Some medicines slow down how quickly the body absorbs carbohydrates. Treatment to manage type 2 diabetes includes:  Making healthy food choices and being active. Losing weight, if you need to. Seeing your doctor regularly. Keeping your blood sugar in your target range. Taking medicines, if you need them. Quitting smoking, if you smoke. Keeping your blood pressure and cholesterol under control. Follow-up care is a key part of your treatment and safety. Be sure to make and go to all appointments, and call your doctor if you are having problems. It's also a good idea to know your test results and keep a list of the medicines you take. Where can you learn more?   Go to http://www.denise.com/ and enter H839 to learn more about \"Learning About Type 2 Diabetes. \"  Current as of: April 13, 2022               Content Version: 13.5  © 7294-3221 Healthwise, Incorporated. Care instructions adapted under license by [facility] ([facility]). If you have questions about a medical condition or this instruction, always ask your healthcare professional. [address] any warranty or liability for your use of this information.

## 2023-01-23 NOTE — PROGRESS NOTES
[address] [facility]  [address] [facility] [address] [address]  Dept: [phone]  Dept Fax: [phone]    Holden Hunter is a 60 y.o. female who presents today for her medicalconditions/complaints as noted below. Holden Hunter is c/o of Diabetes      HPI:     59-year-old female patient presents with complaints of follow-up. Abdominal pain, ongoing. Seeing GI. Had colonoscopy and egd showing mild gastritis. Ongoing diarrhea, has GI f/u scheduled. Ongoing left-sided lower back pain. Had recent MRI showing several areas of disc bulging. Has been treating with Mobic and Tylenol without complete resolution. Used to take a muscle relaxant with partial benefits. Referral to pain management placed. Did complete some PT, recommended pt and trialing gabapentin 100 in am and 200 in pm, needs refilled. Also requesting handicap placard. Type 2 diabetes last A1c 8.6 now 7.8, Managd with janumet 1000-50     Currently on NP thyroid 60, last TSH normal, hx of thyroid nodule last ultrasound normal.       Past Medical History:   Diagnosis Date    Diabetes mellitus ([facility] [address]     Hypothyroidism         Current Outpatient Medications   Medication Sig Dispense Refill    Handicap Placard MISC by Does not apply route Duration: Lifetime 1 each 0    gabapentin (NEURONTIN) 100 MG capsule Take 1 capsule by mouth 3 times daily for 180 days.  Intended supply: 90 days 270 capsule 1    SITagliptin-metFORMIN (JANUMET XR)  MG TB24 per extended release tablet Take 1 tablet by mouth daily 90 tablet 1    Barberry-Oreg Grape-Goldenseal (BERBERINE COMPLEX PO) Take by mouth      meloxicam (MOBIC) 15 MG tablet TAKE ONE TABLET BY MOUTH DAILY 30 tablet 5    NP THYROID 60 MG tablet TAKE ONE TABLET BY MOUTH DAILY 30 tablet 5    Cyanocobalamin (VITAMIN B 12 PO) Take by mouth      OIL OF OREGANO PO Take by mouth      Chromium Picolinate 200 MCG CAPS Take by mouth Cholecalciferol (VITAMIN D-3) 25 MCG (1000 UT) CAPS Take by mouth      Cranberry 1000 MG CAPS Take by mouth       No current facility-administered medications for this visit. Allergies   Allergen Reactions    Insulins      Feels like she is having a stroke      Sulfa Antibiotics Hives       Subjective:      Review of Systems   Constitutional:  Negative for chills and fever. HENT:  Negative for ear pain, sinus pain and sore throat. Respiratory:  Negative for cough and shortness of breath. Cardiovascular:  Negative for chest pain and palpitations. Gastrointestinal:  Positive for diarrhea. Negative for abdominal pain, nausea and vomiting. Musculoskeletal:  Positive for back pain. Neurological:  Negative for dizziness and headaches. All other systems reviewed and are negative.    :Objective     Physical Exam  Vitals and nursing note reviewed. Constitutional:       Appearance: Normal appearance. Cardiovascular:      Rate and Rhythm: Normal rate. Pulmonary:      Effort: Pulmonary effort is normal.      Breath sounds: Normal breath sounds. Skin:     General: Skin is warm and dry. Neurological:      General: No focal deficit present. Mental Status: She is alert and oriented to person, place, and time. BP (!) 140/94 (Site: Left Upper Arm, Position: Sitting, Cuff Size: Medium Adult)   Pulse 90   Temp 97.7 °F (36.5 °C) (Tympanic)   Resp 18   Ht 5' 5\" (1.651 m)   Wt 249 lb (112.9 kg)   SpO2 96%   BMI 41.44 kg/m²     Lab Review   Office Visit on 10/17/2022   Component Date Value    Hemoglobin A1C 10/17/2022 8.6        Assessment and Plan      1. Type 2 diabetes mellitus without complication, without long-term current use of insulin ([facility])  -     POCT glycosylated hemoglobin (Hb A1C)  -     CBC with Auto Differential; Future  -     Comprehensive Metabolic Panel; Future  2. Hypothyroidism, unspecified type  -     CBC with Auto Differential; Future  -     TSH With Reflex Ft4; Future  3. Chronic midline low back pain, unspecified whether sciatica present  -     CBC with Auto Differential; Future  -     Comprehensive Metabolic Panel; Future  4. Hyperlipidemia, unspecified hyperlipidemia type  -     CBC with Auto Differential; Future  -     Lipid Panel; Future     Labs ordered  Trial rybelsus 3 mg tabs daily  Med check 3 months, sooner prn              Results for orders placed or performed in visit on 01/23/23   POCT glycosylated hemoglobin (Hb A1C)   Result Value Ref Range    Hemoglobin A1C 7.8 %             Return in about 3 months (around 4/23/2023), or if symptoms worsen or fail to improve. No orders of the defined types were placed in this encounter. Patient given educational materials - see patient instructions. Discussed use, benefit, and side effects of prescribed medications. All patientquestions answered. Pt voiced understanding. Patient given educational materials - see patient instructions. Discussed use, benefit, and side effects of prescribed medications. All patientquestions answered. Pt voiced understanding. This note was transcribed using dictation with Dragon services. Efforts were made to correct any errors but some words may be misinterpreted.     Patient assumes risks associated with failure to complete recommended testing and treatments in a timely manner    Electronically signed by FAITH Seymour CNP on 1/23/2023at 1:06 PM

## 2023-01-23 NOTE — PROGRESS NOTES
Visit Information    Have you changed or started any medications since your last visit including any over-the-counter medicines, vitamins, or herbal medicines? no   Are you having any side effects from any of your medications? -  no  Have you stopped taking any of your medications? Is so, why? -  no    Have you seen any other physician or provider since your last visit? No  Have you had any other diagnostic tests since your last visit? No  Have you been seen in the emergency room and/or had an admission to a hospital since we last saw you? No  Have you had your routine dental cleaning in the past 6 months? yes -     Have you activated your [employer] account? If not, what are your barriers? No:      Patient Care Team:  FAITH Henry CNP as PCP - General (Certified Nurse Practitioner)  FAITH Henry CNP as PCP - [facility] Empaneled Provider  Maninder Escoto MD as Consulting Physician (Gastroenterology)    Medical History Review  Past Medical, Family, and Social History reviewed and does contribute to the patient presenting condition    Health Maintenance   Topic Date Due    COVID-19 Vaccine (1) Never done    Pneumococcal 0-64 years Vaccine (1 - PCV) Never done    HIV screen  Never done    Diabetic retinal exam  Never done    Cervical cancer screen  Never done    Shingles vaccine (1 of 2) Never done    DTaP/Tdap/Td vaccine (2 - Td or Tdap) 11/10/2021    Lipids  02/23/2023    Flu vaccine (1) 10/17/2023 (Originally 8/1/2022)    GFR test (Diabetes, CKD 3-4, OR last GFR 15-59)  03/19/2023    Depression Screen  05/09/2023    Diabetic foot exam  07/11/2023    Diabetic Alb to Cr ratio (uACR) test  07/11/2023    A1C test (Diabetic or Prediabetic)  10/17/2023    Breast cancer screen  05/18/2024    Colorectal Cancer Screen  04/22/2027    Hepatitis C screen  Completed    Hepatitis A vaccine  Aged Out    Hib vaccine  Aged Out    Meningococcal (ACWY) vaccine  Aged Out

## 2023-03-10 ENCOUNTER — TELEPHONE (OUTPATIENT)
Dept: FAMILY MEDICINE CLINIC | Age: 63
End: 2023-03-10

## 2023-03-10 DIAGNOSIS — R53.83 FATIGUE, UNSPECIFIED TYPE: Primary | ICD-10-CM

## 2023-03-10 NOTE — TELEPHONE ENCOUNTER
----- Message from Estephania Palacios sent at 3/10/2023  1:38 PM EST -----  Subject: Referral Request    Reason for referral request? Pt would like to get lab work done to test   for lime disease. Pt would also like to  a copy of her last labs. Pt would like to  the new order and the results  Provider patient wants to be referred to(if known):     Provider Phone Number(if known):     Additional Information for Provider?   ---------------------------------------------------------------------------  --------------  [address]    [phone]; OK to leave message on voicemail  ---------------------------------------------------------------------------  --------------

## 2023-03-24 DIAGNOSIS — E11.9 TYPE 2 DIABETES MELLITUS WITHOUT COMPLICATION, WITHOUT LONG-TERM CURRENT USE OF INSULIN (HCC): ICD-10-CM

## 2023-03-24 DIAGNOSIS — E03.9 HYPOTHYROIDISM, UNSPECIFIED TYPE: ICD-10-CM

## 2023-03-24 RX ORDER — SITAGLIPTIN AND METFORMIN HYDROCHLORIDE 1000; 50 MG/1; MG/1
TABLET, FILM COATED, EXTENDED RELEASE ORAL
Qty: 90 TABLET | Refills: 1 | Status: SHIPPED | OUTPATIENT
Start: 2023-03-24

## 2023-03-24 RX ORDER — LEVOTHYROXINE, LIOTHYRONINE 38; 9 UG/1; UG/1
TABLET ORAL
Qty: 30 TABLET | Refills: 5 | Status: SHIPPED | OUTPATIENT
Start: 2023-03-24

## 2023-04-06 ENCOUNTER — TELEPHONE (OUTPATIENT)
Dept: FAMILY MEDICINE CLINIC | Age: 63
End: 2023-04-06

## 2023-04-06 DIAGNOSIS — B37.31 YEAST VAGINITIS: Primary | ICD-10-CM

## 2023-04-06 RX ORDER — FLUCONAZOLE 150 MG/1
150 TABLET ORAL
Qty: 2 TABLET | Refills: 0 | Status: SHIPPED | OUTPATIENT
Start: 2023-04-06 | End: 2023-04-12

## 2023-04-06 NOTE — TELEPHONE ENCOUNTER
----- Message from Aundreatwila Reneegs sent at 4/6/2023  9:33 AM EDT -----  Subject: Message to Provider    QUESTIONS  Information for Provider? Pt had a tooth infection and got an antibiotic   from the dentist. She now has a yeast infection and is asking if something   could be called in to [facility] in [place]. Please call pt to advise.  ---------------------------------------------------------------------------  --------------  Nedra ACOSTA  [phone]; OK to leave message on voicemail  ---------------------------------------------------------------------------  --------------  SCRIPT ANSWERS  Relationship to Patient?  Self

## 2023-04-17 ENCOUNTER — HOSPITAL ENCOUNTER (OUTPATIENT)
Age: 63
Setting detail: SPECIMEN
Discharge: HOME | End: 2023-04-17

## 2023-04-17 DIAGNOSIS — E11.9 TYPE 2 DIABETES MELLITUS WITHOUT COMPLICATION, WITHOUT LONG-TERM CURRENT USE OF INSULIN (HCC): ICD-10-CM

## 2023-04-17 DIAGNOSIS — M54.50 CHRONIC MIDLINE LOW BACK PAIN, UNSPECIFIED WHETHER SCIATICA PRESENT: ICD-10-CM

## 2023-04-17 DIAGNOSIS — R53.83 FATIGUE, UNSPECIFIED TYPE: ICD-10-CM

## 2023-04-17 DIAGNOSIS — E78.5 HYPERLIPIDEMIA, UNSPECIFIED HYPERLIPIDEMIA TYPE: ICD-10-CM

## 2023-04-17 DIAGNOSIS — G89.29 CHRONIC MIDLINE LOW BACK PAIN, UNSPECIFIED WHETHER SCIATICA PRESENT: ICD-10-CM

## 2023-04-17 DIAGNOSIS — E03.9 HYPOTHYROIDISM, UNSPECIFIED TYPE: ICD-10-CM

## 2023-04-17 LAB
ABSOLUTE EOS #: 0.29 K/UL (ref 0–0.44)
ABSOLUTE IMMATURE GRANULOCYTE: <0.03 K/UL (ref 0–0.3)
ABSOLUTE LYMPH #: 1.22 K/UL (ref 1.1–3.7)
ABSOLUTE MONO #: 0.44 K/UL (ref 0.1–1.2)
ALBUMIN SERPL-MCNC: 4.2 G/DL (ref 3.5–5.2)
ALBUMIN/GLOBULIN RATIO: 1.4 (ref 1–2.5)
ALP SERPL-CCNC: 63 U/L (ref 35–104)
ALT SERPL-CCNC: 9 U/L (ref 5–33)
ANION GAP SERPL CALCULATED.3IONS-SCNC: 11 MMOL/L (ref 9–17)
AST SERPL-CCNC: 11 U/L
BASOPHILS # BLD: 1 % (ref 0–2)
BASOPHILS ABSOLUTE: 0.03 K/UL (ref 0–0.2)
BILIRUB SERPL-MCNC: 0.4 MG/DL (ref 0.3–1.2)
BUN SERPL-MCNC: 18 MG/DL (ref 8–23)
CALCIUM SERPL-MCNC: 9.6 MG/DL (ref 8.6–10.4)
CHLORIDE SERPL-SCNC: 101 MMOL/L (ref 98–107)
CHOLEST SERPL-MCNC: 191 MG/DL
CHOLESTEROL/HDL RATIO: 3
CO2 SERPL-SCNC: 28 MMOL/L (ref 20–31)
CREAT SERPL-MCNC: 0.55 MG/DL (ref 0.5–0.9)
EOSINOPHILS RELATIVE PERCENT: 5 % (ref 1–4)
GFR SERPL CREATININE-BSD FRML MDRD: >60 ML/MIN/1.73M2
GLUCOSE SERPL-MCNC: 162 MG/DL (ref 70–99)
HCT VFR BLD AUTO: 39.4 % (ref 36.3–47.1)
HDLC SERPL-MCNC: 63 MG/DL
HGB BLD-MCNC: 12.4 G/DL (ref 11.9–15.1)
IMMATURE GRANULOCYTES: 0 %
LDLC SERPL CALC-MCNC: 101 MG/DL (ref 0–130)
LYMPHOCYTES # BLD: 22 % (ref 24–43)
MCH RBC QN AUTO: 29.2 PG (ref 25.2–33.5)
MCHC RBC AUTO-ENTMCNC: 31.5 G/DL (ref 28.4–34.8)
MCV RBC AUTO: 92.9 FL (ref 82.6–102.9)
MONOCYTES # BLD: 8 % (ref 3–12)
NRBC AUTOMATED: 0 PER 100 WBC
PDW BLD-RTO: 14.4 % (ref 11.8–14.4)
PLATELET # BLD AUTO: 341 K/UL (ref 138–453)
PMV BLD AUTO: 10.8 FL (ref 8.1–13.5)
POTASSIUM SERPL-SCNC: 3.9 MMOL/L (ref 3.7–5.3)
PROT SERPL-MCNC: 7.1 G/DL (ref 6.4–8.3)
RBC # BLD: 4.24 M/UL (ref 3.95–5.11)
SEG NEUTROPHILS: 64 % (ref 36–65)
SEGMENTED NEUTROPHILS ABSOLUTE COUNT: 3.58 K/UL (ref 1.5–8.1)
SODIUM SERPL-SCNC: 140 MMOL/L (ref 135–144)
TRIGL SERPL-MCNC: 135 MG/DL
TSH SERPL-ACNC: 2.37 UIU/ML (ref 0.3–5)
WBC # BLD AUTO: 5.6 K/UL (ref 3.5–11.3)

## 2023-04-19 LAB
LYME IGM WB: NEGATIVE
LYME WESTERN BLOT IGG: NEGATIVE

## 2023-04-24 ENCOUNTER — OFFICE VISIT (OUTPATIENT)
Dept: FAMILY MEDICINE CLINIC | Age: 63
End: 2023-04-24

## 2023-04-24 VITALS
HEART RATE: 87 BPM | WEIGHT: 265 LBS | DIASTOLIC BLOOD PRESSURE: 70 MMHG | TEMPERATURE: 97.5 F | HEIGHT: 65 IN | OXYGEN SATURATION: 98 % | SYSTOLIC BLOOD PRESSURE: 134 MMHG | BODY MASS INDEX: 44.15 KG/M2

## 2023-04-24 DIAGNOSIS — G89.29 CHRONIC MIDLINE LOW BACK PAIN, UNSPECIFIED WHETHER SCIATICA PRESENT: ICD-10-CM

## 2023-04-24 DIAGNOSIS — M54.50 CHRONIC MIDLINE LOW BACK PAIN, UNSPECIFIED WHETHER SCIATICA PRESENT: ICD-10-CM

## 2023-04-24 DIAGNOSIS — E11.9 TYPE 2 DIABETES MELLITUS WITHOUT COMPLICATION, WITHOUT LONG-TERM CURRENT USE OF INSULIN (HCC): Primary | ICD-10-CM

## 2023-04-24 DIAGNOSIS — R06.02 SHORTNESS OF BREATH: ICD-10-CM

## 2023-04-24 DIAGNOSIS — R60.9 PERIPHERAL EDEMA: ICD-10-CM

## 2023-04-24 DIAGNOSIS — E03.9 HYPOTHYROIDISM, UNSPECIFIED TYPE: ICD-10-CM

## 2023-04-24 LAB — HBA1C MFR BLD: 7.5 %

## 2023-04-24 RX ORDER — GABAPENTIN 100 MG/1
100 CAPSULE ORAL 2 TIMES DAILY
Qty: 180 CAPSULE | Refills: 1 | Status: SHIPPED | OUTPATIENT
Start: 2023-04-24 | End: 2023-10-21

## 2023-04-24 RX ORDER — SITAGLIPTIN AND METFORMIN HYDROCHLORIDE 1000; 100 MG/1; MG/1
1 TABLET, FILM COATED, EXTENDED RELEASE ORAL DAILY
Qty: 90 TABLET | Refills: 1 | Status: SHIPPED | OUTPATIENT
Start: 2023-04-24

## 2023-04-24 RX ORDER — FUROSEMIDE 20 MG/1
20 TABLET ORAL DAILY PRN
Qty: 30 TABLET | Refills: 1 | Status: SHIPPED | OUTPATIENT
Start: 2023-04-24

## 2023-04-24 SDOH — ECONOMIC STABILITY: INCOME INSECURITY: HOW HARD IS IT FOR YOU TO PAY FOR THE VERY BASICS LIKE FOOD, HOUSING, MEDICAL CARE, AND HEATING?: NOT HARD AT ALL

## 2023-04-24 SDOH — ECONOMIC STABILITY: FOOD INSECURITY: WITHIN THE PAST 12 MONTHS, THE FOOD YOU BOUGHT JUST DIDN'T LAST AND YOU DIDN'T HAVE MONEY TO GET MORE.: NEVER TRUE

## 2023-04-24 SDOH — ECONOMIC STABILITY: FOOD INSECURITY: WITHIN THE PAST 12 MONTHS, YOU WORRIED THAT YOUR FOOD WOULD RUN OUT BEFORE YOU GOT MONEY TO BUY MORE.: NEVER TRUE

## 2023-04-24 SDOH — ECONOMIC STABILITY: HOUSING INSECURITY
IN THE LAST 12 MONTHS, WAS THERE A TIME WHEN YOU DID NOT HAVE A STEADY PLACE TO SLEEP OR SLEPT IN A SHELTER (INCLUDING NOW)?: NO

## 2023-04-24 ASSESSMENT — ENCOUNTER SYMPTOMS
ABDOMINAL PAIN: 0
NAUSEA: 0
SINUS PAIN: 0
DIARRHEA: 0
COUGH: 0
SORE THROAT: 0
SHORTNESS OF BREATH: 0
VOMITING: 0

## 2023-04-24 NOTE — PATIENT INSTRUCTIONS
Patient Education        Learning About Type 2 Diabetes  What is type 2 diabetes? Type 2 diabetes is a condition in which you have too much sugar (glucose) in your blood. Glucose is a type of sugar produced in your body when carbohydrates and other foods are digested. It provides energy to cells throughout the body. Normally, blood sugar levels increase after you eat a meal. When blood sugar rises, cells in the pancreas release insulin, which causes the body to absorb sugar from the blood and lowers the blood sugar level to normal.  When you have type 2 diabetes, sugar stays in the blood rather than entering the body's cells to be used for energy. This results in high blood sugar. It happens when your body can't use insulin the right way. Over time, high blood sugar can harm many parts of the body, such as your eyes, heart, blood vessels, nerves, and kidneys. It can also increase your risk for other health problems (complications). What can you expect with type 2 diabetes? Matt Hoyos keep hearing about how important it is to keep your blood sugar within a target range. That's because over time, high blood sugar can lead to serious problems. It can:  Harm your eyes, nerves, and kidneys. Damage your blood vessels, leading to heart disease and stroke. Reduce blood flow and cause nerve damage to parts of your body, especially your feet. This can cause slow healing and pain when you walk. Make your immune system weak and less able to fight infections. When people hear the word \"diabetes,\" they often think of problems like these. But daily care and treatment can help prevent or delay these problems. The goal is to keep your blood sugar in a target range. That's the best way to reduce your chance of having more problems from diabetes. What are the symptoms? Some people who have type 2 diabetes may not have any symptoms early on.  Many people with the disease don't even know they have it at first. But with time,

## 2023-04-24 NOTE — PROGRESS NOTES
[address] [facility]  [address] [facility] [address] [facility] [address] [address]  Dept: [phone]  Dept Fax: [phone]    Leola Yañez is a 60 y.o. female who presents today for her medicalconditions/complaints as noted below. Leola Yañez is c/o of Diabetes      HPI:     59-year-old female patient presents with complaints of follow-up. Abdominal pain, ongoing. Seeing GI. Had colonoscopy and egd showing mild gastritis. Ongoing diarrhea     Ongoing left-sided lower back pain. Had recent MRI showing several areas of disc bulging. Has been treating with Mobic and Tylenol without complete resolution. Used to take a muscle relaxant with partial benefits. Referral to pain management placed. Did complete some PT, recommended pt and trialing gabapentin 100 bid,     Type 2 diabetes last A1c 7.8 now 7.5, Managed with janumet 1000-50, did not tolerate rybelsus. Working hard on her diet and lifestyle. Currently on NP thyroid 60, last TSH normal, hx of thyroid nodule last ultrasound normal.     Recent uri, following this had peripheral edema for several days. Tried course of bumex and did well. Reports shortness of breath resolved. Echo and venous reflux studies sent. Lasix prn. Past Medical History:   Diagnosis Date    Diabetes mellitus (HCC)     Hypothyroidism         Current Outpatient Medications   Medication Sig Dispense Refill    SITagliptin-metFORMIN HCl ER (JANUMET XR) 100-1000 MG TB24 Take 1 tablet by mouth daily 90 tablet 1    gabapentin (NEURONTIN) 100 MG capsule Take 1 capsule by mouth in the morning and at bedtime for 180 days.  Intended supply: 90 days Max Daily Amount: 200 mg 180 capsule 1    furosemide (LASIX) 20 MG tablet Take 1 tablet by mouth daily as needed (peripheral edema) 30 tablet 1    NP THYROID 60 MG tablet TAKE ONE TABLET BY MOUTH DAILY 30 tablet 5    Handicap Placard MISC by Does not apply route Duration: Lifetime 1 each 0

## 2023-04-24 NOTE — PROGRESS NOTES
Visit Information    Have you changed or started any medications since your last visit including any over-the-counter medicines, vitamins, or herbal medicines? no   Are you having any side effects from any of your medications? -  no  Have you stopped taking any of your medications? Is so, why? -  no    Have you seen any other physician or provider since your last visit? No  Have you had any other diagnostic tests since your last visit? No  Have you been seen in the emergency room and/or had an admission to a hospital since we last saw you? No  Have you had your routine dental cleaning in the past 6 months? no    Have you activated your [employer] account? If not, what are your barriers?  No:      Patient Care Team:  Ruffin Habermann, APRN - CNP as PCP - General (Certified Nurse Practitioner)  Ruffin Habermann, APRN - CNP as PCP - Empaneled Provider  Rogelio Fortune MD as Consulting Physician (Gastroenterology)    Medical History Review  Past Medical, Family, and Social History reviewed and  contribute to the patient presenting condition    Health Maintenance   Topic Date Due    COVID-19 Vaccine (1) Never done    Pneumococcal 0-64 years Vaccine (1 - PCV) Never done    HIV screen  Never done    Diabetic retinal exam  Never done    Cervical cancer screen  Never done    Shingles vaccine (1 of 2) Never done    DTaP/Tdap/Td vaccine (2 - Td or Tdap) 11/10/2021    Flu vaccine (Season Ended) 10/17/2023 (Originally 8/1/2023)    Diabetic foot exam  07/11/2023    Diabetic Alb to Cr ratio (uACR) test  07/11/2023    A1C test (Diabetic or Prediabetic)  01/23/2024    Depression Screen  01/23/2024    Lipids  04/17/2024    GFR test (Diabetes, CKD 3-4, OR last GFR 15-59)  04/17/2024    Breast cancer screen  05/18/2024    Colorectal Cancer Screen  04/22/2027    Hepatitis C screen  Completed    Hepatitis A vaccine  Aged Out    Hib vaccine  Aged Out    Meningococcal (ACWY) vaccine  Aged Out    Diabetes screen  Discontinued

## 2023-06-30 DIAGNOSIS — R06.02 SHORTNESS OF BREATH: ICD-10-CM

## 2023-06-30 DIAGNOSIS — R60.9 PERIPHERAL EDEMA: ICD-10-CM

## 2023-06-30 RX ORDER — FUROSEMIDE 20 MG/1
20 TABLET ORAL DAILY PRN
Qty: 30 TABLET | Refills: 1 | Status: SHIPPED | OUTPATIENT
Start: 2023-06-30

## 2023-09-06 DIAGNOSIS — R06.02 SHORTNESS OF BREATH: ICD-10-CM

## 2023-09-06 DIAGNOSIS — R60.9 PERIPHERAL EDEMA: ICD-10-CM

## 2023-09-06 RX ORDER — FUROSEMIDE 20 MG/1
TABLET ORAL
Qty: 30 TABLET | Refills: 1 | Status: SHIPPED | OUTPATIENT
Start: 2023-09-06

## 2023-09-22 DIAGNOSIS — E11.9 TYPE 2 DIABETES MELLITUS WITHOUT COMPLICATION, WITHOUT LONG-TERM CURRENT USE OF INSULIN (HCC): ICD-10-CM

## 2023-09-22 RX ORDER — SITAGLIPTIN AND METFORMIN HYDROCHLORIDE 1000; 100 MG/1; MG/1
1 TABLET, FILM COATED, EXTENDED RELEASE ORAL DAILY
Qty: 90 TABLET | Refills: 0 | Status: SHIPPED | OUTPATIENT
Start: 2023-09-22

## 2023-10-02 ENCOUNTER — OFFICE VISIT (OUTPATIENT)
Dept: FAMILY MEDICINE CLINIC | Age: 63
End: 2023-10-02
Payer: COMMERCIAL

## 2023-10-02 VITALS
WEIGHT: 254.6 LBS | SYSTOLIC BLOOD PRESSURE: 134 MMHG | OXYGEN SATURATION: 97 % | HEIGHT: 65 IN | HEART RATE: 76 BPM | TEMPERATURE: 97.4 F | BODY MASS INDEX: 42.42 KG/M2 | DIASTOLIC BLOOD PRESSURE: 80 MMHG

## 2023-10-02 DIAGNOSIS — M54.50 CHRONIC MIDLINE LOW BACK PAIN, UNSPECIFIED WHETHER SCIATICA PRESENT: ICD-10-CM

## 2023-10-02 DIAGNOSIS — G89.29 CHRONIC MIDLINE LOW BACK PAIN, UNSPECIFIED WHETHER SCIATICA PRESENT: ICD-10-CM

## 2023-10-02 DIAGNOSIS — Z12.31 BREAST CANCER SCREENING BY MAMMOGRAM: ICD-10-CM

## 2023-10-02 DIAGNOSIS — R60.9 PERIPHERAL EDEMA: ICD-10-CM

## 2023-10-02 DIAGNOSIS — E11.9 TYPE 2 DIABETES MELLITUS WITHOUT COMPLICATION, WITHOUT LONG-TERM CURRENT USE OF INSULIN (HCC): Primary | ICD-10-CM

## 2023-10-02 DIAGNOSIS — E03.9 HYPOTHYROIDISM, UNSPECIFIED TYPE: ICD-10-CM

## 2023-10-02 DIAGNOSIS — R10.13 DYSPEPSIA: ICD-10-CM

## 2023-10-02 LAB — HBA1C MFR BLD: 8.7 %

## 2023-10-02 PROCEDURE — 3052F HG A1C>EQUAL 8.0%<EQUAL 9.0%: CPT | Performed by: NURSE PRACTITIONER

## 2023-10-02 PROCEDURE — 99214 OFFICE O/P EST MOD 30 MIN: CPT | Performed by: NURSE PRACTITIONER

## 2023-10-02 PROCEDURE — 83036 HEMOGLOBIN GLYCOSYLATED A1C: CPT | Performed by: NURSE PRACTITIONER

## 2023-10-02 RX ORDER — PANTOPRAZOLE SODIUM 40 MG/1
40 TABLET, DELAYED RELEASE ORAL
Qty: 30 TABLET | Refills: 2 | Status: SHIPPED | OUTPATIENT
Start: 2023-10-02

## 2023-10-02 RX ORDER — CYCLOBENZAPRINE HCL 5 MG
5 TABLET ORAL NIGHTLY PRN
Qty: 30 TABLET | Refills: 2 | Status: SHIPPED | OUTPATIENT
Start: 2023-10-02 | End: 2023-12-31

## 2023-10-02 ASSESSMENT — ENCOUNTER SYMPTOMS
SORE THROAT: 0
NAUSEA: 0
ABDOMINAL PAIN: 0
SHORTNESS OF BREATH: 0
COUGH: 0
SINUS PAIN: 0
VOMITING: 0
DIARRHEA: 0

## 2023-10-02 NOTE — PROGRESS NOTES
Discontinued
and Plan      1. Type 2 diabetes mellitus without complication, without long-term current use of insulin (HCC)  -     POCT glycosylated hemoglobin (Hb A1C)  -     empagliflozin (JARDIANCE) 10 MG tablet; Take 1 tablet by mouth daily, Disp-30 tablet, R-2Normal  2. Hypothyroidism, unspecified type  3. Peripheral edema  4. Dyspepsia  -     pantoprazole (PROTONIX) 40 MG tablet; Take 1 tablet by mouth every morning (before breakfast), Disp-30 tablet, R-2Normal  5. Chronic midline low back pain, unspecified whether sciatica present  -     cyclobenzaprine (FLEXERIL) 5 MG tablet; Take 1 tablet by mouth nightly as needed for Muscle spasms, Disp-30 tablet, R-2Normal  6. Breast cancer screening by mammogram  -     [facility] DIGITAL SCREEN W OR WO CAD BILATERAL; Future     A1c worsened, adding jardiance 10    Resume protonix due to gastritis    Resume flexeril for low back nightly prn    [facility] [place] ordered placed    Recheck 3 months, sooner prn            Results for orders placed or performed in visit on 10/02/23   POCT glycosylated hemoglobin (Hb A1C)   Result Value Ref Range    Hemoglobin A1C 8.7 %             Return in about 3 months (around 1/2/2024), or if symptoms worsen or fail to improve. Orders Placed This Encounter   Medications    empagliflozin (JARDIANCE) 10 MG tablet     Sig: Take 1 tablet by mouth daily     Dispense:  30 tablet     Refill:  2    pantoprazole (PROTONIX) 40 MG tablet     Sig: Take 1 tablet by mouth every morning (before breakfast)     Dispense:  30 tablet     Refill:  2    cyclobenzaprine (FLEXERIL) 5 MG tablet     Sig: Take 1 tablet by mouth nightly as needed for Muscle spasms     Dispense:  30 tablet     Refill:  2        Patient given educational materials - see patient instructions. Discussed use, benefit, and side effects of prescribed medications. All patientquestions answered. Pt voiced understanding. Patient given educational materials - see patient instructions. Discussed use, benefit, and

## 2023-10-02 NOTE — PATIENT INSTRUCTIONS
diabetes starts to cause symptoms. You have most symptoms of type 2 diabetes when your blood sugar is either too high or too low. The most common symptoms of high blood sugar include: Thirst.  Needing to urinate often. Weight loss. Blurry vision. The symptoms of low blood sugar include:  Sweating. Shakiness. Weakness. Hunger. Confusion. You're not likely to get symptoms of low blood sugar unless you take insulin or use certain diabetes medicines that lower blood sugar. How can you help prevent type 2 diabetes? There are things you can do to help prevent type 2 diabetes. Stay at a healthy weight. Exercise regularly, and eat healthy foods. Even small changes can make a difference. If you have prediabetes, the medicine metformin can help prevent type 2 diabetes. How is type 2 diabetes treated? Treatment for type 2 diabetes will change over time to meet your needs. But the focus of your treatment will usually be to keep your blood sugar levels in your target range. This will help prevent problems such as eye, kidney, heart, blood vessel, and nerve disease. Some people may need medicines to help their bodies make insulin or decrease insulin resistance. Some medicines slow down how quickly the body absorbs carbohydrates. Treatment to manage type 2 diabetes includes:  Making healthy food choices and being active. Losing weight, if you need to. Seeing your doctor regularly. Keeping your blood sugar in your target range. Taking medicines, if you need them. Quitting smoking, if you smoke. Keeping your blood pressure and cholesterol under control. Follow-up care is a key part of your treatment and safety. Be sure to make and go to all appointments, and call your doctor if you are having problems. It's also a good idea to know your test results and keep a list of the medicines you take. Where can you learn more?   Go to http://www.denise.com/ and enter H839 to learn more about \"Learning

## 2023-10-11 NOTE — TELEPHONE ENCOUNTER
Addended by: JAMAR BERTRAND on: 10/11/2023 12:50 PM     Modules accepted: Level of Service     Patient notified.

## 2023-11-13 ENCOUNTER — HOSPITAL ENCOUNTER (OUTPATIENT)
Dept: MAMMOGRAPHY | Age: 63
Discharge: HOME | End: 2023-11-15
Payer: COMMERCIAL

## 2023-11-13 VITALS — HEIGHT: 65 IN | BODY MASS INDEX: 40.82 KG/M2 | WEIGHT: 245 LBS

## 2023-11-13 DIAGNOSIS — Z12.31 BREAST CANCER SCREENING BY MAMMOGRAM: ICD-10-CM

## 2023-11-13 PROCEDURE — 77063 BREAST TOMOSYNTHESIS BI: CPT

## 2023-11-14 DIAGNOSIS — R60.9 PERIPHERAL EDEMA: ICD-10-CM

## 2023-11-14 DIAGNOSIS — R06.02 SHORTNESS OF BREATH: ICD-10-CM

## 2023-11-14 RX ORDER — FUROSEMIDE 20 MG/1
TABLET ORAL
Qty: 30 TABLET | Refills: 1 | Status: SHIPPED | OUTPATIENT
Start: 2023-11-14

## 2023-11-17 DIAGNOSIS — E03.9 HYPOTHYROIDISM, UNSPECIFIED TYPE: ICD-10-CM

## 2023-11-17 RX ORDER — LEVOTHYROXINE, LIOTHYRONINE 38; 9 UG/1; UG/1
60 TABLET ORAL DAILY
Qty: 30 TABLET | Refills: 5 | Status: SHIPPED | OUTPATIENT
Start: 2023-11-17

## 2024-07-22 ENCOUNTER — TELEPHONE (OUTPATIENT)
Dept: FAMILY MEDICINE CLINIC | Age: 64
End: 2024-07-22

## 2024-07-22 DIAGNOSIS — L30.9 DERMATITIS: Primary | ICD-10-CM

## 2024-07-22 NOTE — TELEPHONE ENCOUNTER
Patient called the office and stated that she did have a cream that you have given her for rashes that she gets in the summer. Patient stated that it has been almost 2 years since she has gotten it from you an would like a refill not seeing the medication on the patients med list

## 2024-07-23 RX ORDER — CLOTRIMAZOLE AND BETAMETHASONE DIPROPIONATE 10; .64 MG/G; MG/G
CREAM TOPICAL
Qty: 45 G | Refills: 1 | Status: SHIPPED | OUTPATIENT
Start: 2024-07-23

## 2024-07-23 NOTE — TELEPHONE ENCOUNTER
Called patient and she stated that she is using. Patient wants only the brand if possible  Brand actavis   Cream- clotrimazole betamethasone

## 2024-08-05 ENCOUNTER — OFFICE VISIT (OUTPATIENT)
Dept: FAMILY MEDICINE CLINIC | Age: 64
End: 2024-08-05
Payer: COMMERCIAL

## 2024-08-05 VITALS
OXYGEN SATURATION: 98 % | TEMPERATURE: 97 F | BODY MASS INDEX: 41.15 KG/M2 | HEIGHT: 65 IN | SYSTOLIC BLOOD PRESSURE: 138 MMHG | WEIGHT: 247 LBS | DIASTOLIC BLOOD PRESSURE: 76 MMHG | HEART RATE: 80 BPM

## 2024-08-05 DIAGNOSIS — E11.9 TYPE 2 DIABETES MELLITUS WITHOUT COMPLICATION, WITHOUT LONG-TERM CURRENT USE OF INSULIN (HCC): Primary | ICD-10-CM

## 2024-08-05 DIAGNOSIS — E03.9 HYPOTHYROIDISM, UNSPECIFIED TYPE: ICD-10-CM

## 2024-08-05 DIAGNOSIS — E78.5 HYPERLIPIDEMIA, UNSPECIFIED HYPERLIPIDEMIA TYPE: ICD-10-CM

## 2024-08-05 LAB — HBA1C MFR BLD: 9.7 %

## 2024-08-05 PROCEDURE — 3046F HEMOGLOBIN A1C LEVEL >9.0%: CPT | Performed by: NURSE PRACTITIONER

## 2024-08-05 PROCEDURE — 83036 HEMOGLOBIN GLYCOSYLATED A1C: CPT | Performed by: NURSE PRACTITIONER

## 2024-08-05 PROCEDURE — 99214 OFFICE O/P EST MOD 30 MIN: CPT | Performed by: NURSE PRACTITIONER

## 2024-08-05 RX ORDER — SITAGLIPTIN AND METFORMIN HYDROCHLORIDE 1000; 100 MG/1; MG/1
1 TABLET, FILM COATED, EXTENDED RELEASE ORAL DAILY
Qty: 90 TABLET | Refills: 0 | Status: SHIPPED | OUTPATIENT
Start: 2024-08-05

## 2024-08-05 SDOH — ECONOMIC STABILITY: FOOD INSECURITY: WITHIN THE PAST 12 MONTHS, THE FOOD YOU BOUGHT JUST DIDN'T LAST AND YOU DIDN'T HAVE MONEY TO GET MORE.: NEVER TRUE

## 2024-08-05 SDOH — ECONOMIC STABILITY: INCOME INSECURITY: HOW HARD IS IT FOR YOU TO PAY FOR THE VERY BASICS LIKE FOOD, HOUSING, MEDICAL CARE, AND HEATING?: NOT HARD AT ALL

## 2024-08-05 SDOH — ECONOMIC STABILITY: FOOD INSECURITY: WITHIN THE PAST 12 MONTHS, YOU WORRIED THAT YOUR FOOD WOULD RUN OUT BEFORE YOU GOT MONEY TO BUY MORE.: NEVER TRUE

## 2024-08-05 ASSESSMENT — ENCOUNTER SYMPTOMS
DIARRHEA: 0
VOMITING: 0
COUGH: 0
ABDOMINAL PAIN: 0
SHORTNESS OF BREATH: 0
NAUSEA: 0
SORE THROAT: 0
SINUS PAIN: 0

## 2024-08-05 ASSESSMENT — PATIENT HEALTH QUESTIONNAIRE - PHQ9
SUM OF ALL RESPONSES TO PHQ QUESTIONS 1-9: 0
1. LITTLE INTEREST OR PLEASURE IN DOING THINGS: NOT AT ALL
SUM OF ALL RESPONSES TO PHQ QUESTIONS 1-9: 0
SUM OF ALL RESPONSES TO PHQ9 QUESTIONS 1 & 2: 0
2. FEELING DOWN, DEPRESSED OR HOPELESS: NOT AT ALL

## 2024-08-05 NOTE — PROGRESS NOTES
[facility]  [facility]  [address] [address]  Dept: [phone]  Dept Fax: [phone]    Suzi Bailey is a 63 y.o. female who presents today for her medicalconditions/complaints as noted below.  Suzi Bailey is c/o of Diabetes      HPI:        63-year-old female patient presents with complaints of follow-up.     Type 2 diabetes last A1c 8.7 today 9.7, Previously Managed with janumet 1000-100, not currently taking,  will resume, did not tolerate rybelsus. Concerns with jardiance  side effects, does not tolerate metformin over 1000. Due for urine micro.      Hypothyroidism, Currently on NP thyroid 60, last TSH normal, hx of thyroid nodule last ultrasound normal. Due for tsh    Up to date on melisa, due for pap, Colonoscopy up to date                  Past Medical History:   Diagnosis Date    Diabetes mellitus (HCC)     Hypothyroidism         Current Outpatient Medications   Medication Sig Dispense Refill    SITagliptin-metFORMIN HCl ER (JANUMET XR) 100-1000 MG TB24 Take 1 tablet by mouth daily 90 tablet 0    clotrimazole-betamethasone (LOTRISONE) 1-0.05 % cream Apply topically 2 times daily. 45 g 1    NP THYROID 60 MG tablet Take 1 tablet by mouth daily 30 tablet 5    Handicap Placard MISC by Does not apply route Duration: Lifetime 1 each 0    Barberry-Oreg Grape-Goldenseal (BERBERINE COMPLEX PO) Take by mouth      Cyanocobalamin (VITAMIN B 12 PO) Take by mouth      OIL OF OREGANO PO Take by mouth      Chromium Picolinate 200 MCG CAPS Take by mouth      Cholecalciferol (VITAMIN D-3) 25 MCG (1000 UT) CAPS Take by mouth      Cranberry 1000 MG CAPS Take by mouth      pantoprazole (PROTONIX) 40 MG tablet Take 1 tablet by mouth every morning (before breakfast) (Patient not taking: Reported on 8/5/2024) 30 tablet 2     No current facility-administered medications for this visit.     Allergies   Allergen Reactions    Insulins      Feels like she

## 2024-08-05 NOTE — PROGRESS NOTES
Visit Information    Have you changed or started any medications since your last visit including any over-the-counter medicines, vitamins, or herbal medicines? no   Have you stopped taking any of your medications? Is so, why? -  no  Are you having any side effects from any of your medications? - no    Have you seen any other physician or provider since your last visit?  no   Have you had any other diagnostic tests since your last visit?  no   Have you been seen in the emergency room and/or had an admission in a hospital since we last saw you?  no   Have you had your routine dental cleaning in the past 6 months?  no     Do you have an active MyChart account? If no, what is the barrier?  Yes    Patient Care Team:  Chapo Juan APRN - CNP as PCP - General (Certified Nurse Practitioner)  Chapo Juan APRN - CNP as PCP - Empaneled Provider  Maninder Escoto MD as Consulting Physician (Gastroenterology)    Medical History Review  Past Medical, Family, and Social History reviewed and  contribute to the patient presenting condition    Health Maintenance   Topic Date Due    COVID-19 Vaccine (1) Never done    Pneumococcal 0-64 years Vaccine (1 of 2 - PCV) Never done    HIV screen  Never done    Diabetic retinal exam  Never done    Cervical cancer screen  Never done    Shingles vaccine (1 of 2) Never done    Respiratory Syncytial Virus (RSV) Pregnant or age 60 yrs+ (1 - 1-dose 60+ series) Never done    DTaP/Tdap/Td vaccine (2 - Td or Tdap) 11/10/2021    Diabetic foot exam  07/11/2023    Diabetic Alb to Cr ratio (uACR) test  07/11/2023    Depression Screen  01/23/2024    Lipids  04/17/2024    GFR test (Diabetes, CKD 3-4, OR last GFR 15-59)  04/17/2024    Flu vaccine (1) Never done    A1C test (Diabetic or Prediabetic)  10/02/2024    Breast cancer screen  11/13/2025    Colorectal Cancer Screen  04/22/2027    Hepatitis C screen  Completed    Hepatitis A vaccine  Aged Out    Hepatitis B vaccine  Aged Out    Hib vaccine

## 2024-08-05 NOTE — PATIENT INSTRUCTIONS
Patient Education        Noninsulin Medicines for Type 2 Diabetes: Care Instructions  Overview     There are different types of noninsulin medicines for diabetes. Each works in a different way. But they all help you control your blood sugar. Some types help your body make insulin to lower your blood sugar. Others lower how much insulin your body needs. Some can slow how fast your body digests sugars. And some can remove extra glucose through your urine.  You may need to take more than one medicine for diabetes. Two or more medicines may work better to lower your blood sugar level than just one does.  Metformin. This lowers how much glucose your liver makes. And it helps you respond better to insulin. It also lowers the amount of stored sugar that your liver releases when you are not eating.  Sulfonylureas. These help your body release more insulin. Some work for many hours. They can cause low blood sugar if you don't eat as you planned. An example is glipizide.  Thiazolidinediones. These reduce the amount of blood glucose. They also help you respond better to insulin. An example is pioglitazone.  SGLT2 inhibitors. These help to remove extra glucose through your urine. They may also help some people lose weight. An example is ertugliflozin.  DPP-4 inhibitors. These help your body raise the level of insulin after you eat. They also help your body make less of a hormone that raises blood sugar. An example is alogliptin.  GLP-1 receptor agonists. These help your body make a protein that can raise your insulin level and make you less hungry. They're given as shots or pills. An example is semaglutide.  Meglitinides. These help your body release insulin. They also help slow how your body digests sugars. So they can keep your blood sugar from rising too fast after you eat.  Alpha-glucosidase inhibitors. These keep starches from breaking down. This means that they lower the amount of glucose absorbed when you eat. They don't

## 2024-11-05 ENCOUNTER — HOSPITAL ENCOUNTER (OUTPATIENT)
Age: 64
Setting detail: SPECIMEN
Discharge: HOME | End: 2024-11-05

## 2024-11-05 DIAGNOSIS — E11.9 TYPE 2 DIABETES MELLITUS WITHOUT COMPLICATION, WITHOUT LONG-TERM CURRENT USE OF INSULIN (HCC): ICD-10-CM

## 2024-11-05 DIAGNOSIS — E78.5 HYPERLIPIDEMIA, UNSPECIFIED HYPERLIPIDEMIA TYPE: ICD-10-CM

## 2024-11-05 DIAGNOSIS — E03.9 HYPOTHYROIDISM, UNSPECIFIED TYPE: ICD-10-CM

## 2024-11-05 LAB
ALBUMIN SERPL-MCNC: 4.2 G/DL (ref 3.5–5.2)
ALBUMIN/GLOB SERPL: 1.8 {RATIO} (ref 1–2.5)
ALP SERPL-CCNC: 74 U/L (ref 35–104)
ALT SERPL-CCNC: 14 U/L (ref 10–35)
ANION GAP SERPL CALCULATED.3IONS-SCNC: 10 MMOL/L (ref 9–16)
AST SERPL-CCNC: 13 U/L (ref 10–35)
BILIRUB SERPL-MCNC: 0.3 MG/DL (ref 0–1.2)
BUN SERPL-MCNC: 15 MG/DL (ref 8–23)
CALCIUM SERPL-MCNC: 9.2 MG/DL (ref 8.6–10.4)
CHLORIDE SERPL-SCNC: 103 MMOL/L (ref 98–107)
CHOLEST SERPL-MCNC: 225 MG/DL (ref 0–199)
CHOLESTEROL/HDL RATIO: 4
CO2 SERPL-SCNC: 26 MMOL/L (ref 20–31)
CREAT SERPL-MCNC: 0.7 MG/DL (ref 0.6–0.9)
CREAT UR-MCNC: 181 MG/DL (ref 28–217)
ERYTHROCYTE [DISTWIDTH] IN BLOOD BY AUTOMATED COUNT: 13.9 % (ref 11.8–14.4)
GFR, ESTIMATED: >90 ML/MIN/1.73M2
GLUCOSE SERPL-MCNC: 285 MG/DL (ref 74–99)
HCT VFR BLD AUTO: 37.3 % (ref 36.3–47.1)
HDLC SERPL-MCNC: 56 MG/DL
HGB BLD-MCNC: 11.8 G/DL (ref 11.9–15.1)
LDLC SERPL CALC-MCNC: 141 MG/DL (ref 0–100)
MCH RBC QN AUTO: 29.6 PG (ref 25.2–33.5)
MCHC RBC AUTO-ENTMCNC: 31.6 G/DL (ref 28.4–34.8)
MCV RBC AUTO: 93.7 FL (ref 82.6–102.9)
MICROALBUMIN UR-MCNC: 28 MG/L (ref 0–20)
MICROALBUMIN/CREAT UR-RTO: 16 MCG/MG CREAT (ref 0–25)
NRBC BLD-RTO: 0 PER 100 WBC
PLATELET # BLD AUTO: 328 K/UL (ref 138–453)
PMV BLD AUTO: 10.8 FL (ref 8.1–13.5)
POTASSIUM SERPL-SCNC: 4 MMOL/L (ref 3.7–5.3)
PROT SERPL-MCNC: 6.6 G/DL (ref 6.6–8.7)
RBC # BLD AUTO: 3.98 M/UL (ref 3.95–5.11)
SODIUM SERPL-SCNC: 139 MMOL/L (ref 136–145)
TRIGL SERPL-MCNC: 142 MG/DL
TSH SERPL DL<=0.05 MIU/L-ACNC: 1.58 UIU/ML (ref 0.27–4.2)
VLDLC SERPL CALC-MCNC: 28 MG/DL (ref 1–30)
WBC OTHER # BLD: 5.9 K/UL (ref 3.5–11.3)

## 2024-11-07 RX ORDER — LISINOPRIL 2.5 MG/1
2.5 TABLET ORAL DAILY
Qty: 90 TABLET | Refills: 3 | Status: SHIPPED | OUTPATIENT
Start: 2024-11-07

## 2024-11-11 ENCOUNTER — OFFICE VISIT (OUTPATIENT)
Dept: FAMILY MEDICINE CLINIC | Age: 64
End: 2024-11-11
Payer: COMMERCIAL

## 2024-11-11 ENCOUNTER — HOSPITAL ENCOUNTER (OUTPATIENT)
Age: 64
Setting detail: SPECIMEN
Discharge: HOME | End: 2024-11-11

## 2024-11-11 VITALS
HEART RATE: 72 BPM | HEIGHT: 65 IN | SYSTOLIC BLOOD PRESSURE: 124 MMHG | TEMPERATURE: 97 F | OXYGEN SATURATION: 97 % | WEIGHT: 249 LBS | BODY MASS INDEX: 41.48 KG/M2 | DIASTOLIC BLOOD PRESSURE: 84 MMHG

## 2024-11-11 DIAGNOSIS — Z12.4 PAP SMEAR FOR CERVICAL CANCER SCREENING: Primary | ICD-10-CM

## 2024-11-11 PROCEDURE — 99396 PREV VISIT EST AGE 40-64: CPT | Performed by: PHYSICIAN ASSISTANT

## 2024-11-11 NOTE — PROGRESS NOTES
Visit Information    Have you changed or started any medications since your last visit including any over-the-counter medicines, vitamins, or herbal medicines? no   Are you having any side effects from any of your medications? -  no  Have you stopped taking any of your medications? Is so, why? -  no    Have you seen any other physician or provider since your last visit? No  Have you had any other diagnostic tests since your last visit? No  Have you been seen in the emergency room and/or had an admission to a hospital since we last saw you? No  Have you had your routine dental cleaning in the past 6 months? no    Have you activated your [employer] account? If not, what are your barriers? Yes     Patient Care Team:  Chapo Juan APRN - CNP as PCP - General (Certified Nurse Practitioner)  Chapo Juan APRN - CNP as PCP - Empaneled Provider  Maninder Escoto MD as Consulting Physician (Gastroenterology)    Medical History Review  Past Medical, Family, and Social History reviewed and  contribute to the patient presenting condition    Health Maintenance   Topic Date Due    Pneumococcal 0-64 years Vaccine (1 of 2 - PCV) Never done    HIV screen  Never done    Diabetic retinal exam  Never done    Cervical cancer screen  Never done    Shingles vaccine (1 of 2) Never done    Respiratory Syncytial Virus (RSV) Pregnant or age 60 yrs+ (1 - 1-dose 60+ series) Never done    DTaP/Tdap/Td vaccine (2 - Td or Tdap) 11/10/2021    Diabetic foot exam  07/11/2023    Flu vaccine (1) Never done    COVID-19 Vaccine (1 - 2023-24 season) Never done    A1C test (Diabetic or Prediabetic)  11/05/2024    Depression Screen  08/05/2025    Diabetic Alb to Cr ratio (uACR) test  11/05/2025    Lipids  11/05/2025    GFR test (Diabetes, CKD 3-4, OR last GFR 15-59)  11/05/2025    Breast cancer screen  11/13/2025    Colorectal Cancer Screen  04/22/2027    Hepatitis C screen  Completed    Hepatitis A vaccine  Aged Out    Hepatitis B vaccine  Aged Out 
non-tender without mass, no thyromegaly or thyroid nodules, no cervical lymphadenopathy  Pulmonary/Chest: clear to auscultation bilaterally- no wheezes, rales or rhonchi, normal air movement, no respiratory distress  Cardiovascular: normal rate, regular rhythm, normal S1 and S2, no murmurs, rubs, clicks, or gallops  Abdomen: soft, non-tender, non-distended, normal bowel sounds, no masses or organomegaly  Pelvic: normal external genitalia, vulva, vagina, cervix, uterus and adnexa. No CMT. No noted discharge or lesions.  No masses noted.   Breast: appear normal, no suspicious masses, no skin or nipple changes or axillary nodes bilaterally.  Psych: pleasant, cooperative      Assessment:    annual pap exam   Diagnosis Orders   1. Pap smear for cervical cancer screening  PAP Smear          Plan:      Return for annual exam.    Office will call pt with pap results in the next 10 days. If no call received pt agreed to call in the next 2 weeks for results.  Await results  Up to date on mammogram  Patient agreed with treatment plan    Orders Placed This Encounter   Procedures    PAP Smear     Patient History:    No LMP recorded. Patient is postmenopausal.  OBGYN Status: Postmenopausal  Past Surgical History:  No date: COLONOSCOPY  4/22/2022: COLONOSCOPY; N/A      Comment:  COLONOSCOPY WITH BIOPSY performed by Maninder Escoto MD at                [facility] OR  4/22/2022: UPPER GASTROINTESTINAL ENDOSCOPY; N/A      Comment:  EGD BIOPSY performed by Maninder Escoto MD at [facility] OR      Social History    Tobacco Use      Smoking status: Never      Smokeless tobacco: Never       Standing Status:   Future     Standing Expiration Date:   11/11/2025     Order Specific Question:   Collection Type     Answer:   Thin Prep     Order Specific Question:   Prior Abnormal Pap Test     Answer:   No     Order Specific Question:   Screening or Diagnostic     Answer:   Screening     Order Specific Question:   HPV Requested?     Answer:   Yes - If Abnormal

## 2024-11-22 LAB — CYTOLOGY REPORT: NORMAL

## 2024-11-25 ENCOUNTER — OFFICE VISIT (OUTPATIENT)
Dept: FAMILY MEDICINE CLINIC | Age: 64
End: 2024-11-25

## 2024-11-25 ENCOUNTER — HOSPITAL ENCOUNTER (OUTPATIENT)
Age: 64
Setting detail: SPECIMEN
Discharge: HOME | End: 2024-11-25

## 2024-11-25 VITALS
TEMPERATURE: 97.1 F | WEIGHT: 249 LBS | SYSTOLIC BLOOD PRESSURE: 130 MMHG | BODY MASS INDEX: 41.48 KG/M2 | HEIGHT: 65 IN | DIASTOLIC BLOOD PRESSURE: 80 MMHG

## 2024-11-25 DIAGNOSIS — Z12.4 PAP SMEAR FOR CERVICAL CANCER SCREENING: Primary | ICD-10-CM

## 2024-11-25 NOTE — PROGRESS NOTES
defined types were placed in this encounter.      Patient given educational materials - see patient instructions.  Discussed use, benefit, and side effects of prescribed medications.  All patient questions answered.  Pt voiced understanding. Reviewed health maintenance.  Instructed to continue current medications, diet and exercise.  Patient agreed with treatment plan. Follow up as directed below.       Please note that this chart was generated using voice recognition Dragon dictation software.  Although every effort was made to ensure the accuracy of this automated transcription, some errors in transcription may have occurred.     Electronically signed by KELSI GARCIA PA-C, PAC on 11/25/2024 at 12:18 PM

## 2024-12-05 ENCOUNTER — COMMUNITY OUTREACH (OUTPATIENT)
Dept: FAMILY MEDICINE CLINIC | Age: 64
End: 2024-12-05

## 2024-12-09 LAB — CYTOLOGY REPORT: NORMAL

## 2024-12-09 RX ORDER — FLUCONAZOLE 100 MG/1
100 TABLET ORAL DAILY
Qty: 3 TABLET | Refills: 0 | Status: SHIPPED | OUTPATIENT
Start: 2024-12-09 | End: 2024-12-12

## 2024-12-16 DIAGNOSIS — E03.9 HYPOTHYROIDISM, UNSPECIFIED TYPE: ICD-10-CM

## 2024-12-16 RX ORDER — LEVOTHYROXINE, LIOTHYRONINE 38; 9 UG/1; UG/1
60 TABLET ORAL DAILY
Qty: 90 TABLET | Refills: 1 | Status: SHIPPED | OUTPATIENT
Start: 2024-12-16

## 2025-01-05 NOTE — TELEPHONE ENCOUNTER
Patient calling, pharmacy didn't receive rx's.  Can you resend it looks like there was connection issues Hgb 6.9

## 2025-03-20 ENCOUNTER — TELEPHONE (OUTPATIENT)
Dept: FAMILY MEDICINE CLINIC | Age: 65
End: 2025-03-20

## 2025-06-16 ENCOUNTER — OFFICE VISIT (OUTPATIENT)
Dept: FAMILY MEDICINE CLINIC | Age: 65
End: 2025-06-16
Payer: MEDICARE

## 2025-06-16 VITALS
BODY MASS INDEX: 43.89 KG/M2 | WEIGHT: 263.4 LBS | SYSTOLIC BLOOD PRESSURE: 130 MMHG | HEART RATE: 86 BPM | HEIGHT: 65 IN | DIASTOLIC BLOOD PRESSURE: 78 MMHG | OXYGEN SATURATION: 95 %

## 2025-06-16 DIAGNOSIS — E11.9 TYPE 2 DIABETES MELLITUS WITHOUT COMPLICATION, WITHOUT LONG-TERM CURRENT USE OF INSULIN (HCC): ICD-10-CM

## 2025-06-16 DIAGNOSIS — E78.5 HYPERLIPIDEMIA, UNSPECIFIED HYPERLIPIDEMIA TYPE: ICD-10-CM

## 2025-06-16 DIAGNOSIS — E03.9 HYPOTHYROIDISM, UNSPECIFIED TYPE: Primary | ICD-10-CM

## 2025-06-16 PROCEDURE — 1123F ACP DISCUSS/DSCN MKR DOCD: CPT | Performed by: NURSE PRACTITIONER

## 2025-06-16 PROCEDURE — 99214 OFFICE O/P EST MOD 30 MIN: CPT | Performed by: NURSE PRACTITIONER

## 2025-06-16 RX ORDER — LEVOTHYROXINE SODIUM 75 UG/1
75 TABLET ORAL DAILY
Qty: 90 TABLET | Refills: 1 | Status: SHIPPED | OUTPATIENT
Start: 2025-06-16

## 2025-06-16 SDOH — ECONOMIC STABILITY: FOOD INSECURITY: WITHIN THE PAST 12 MONTHS, YOU WORRIED THAT YOUR FOOD WOULD RUN OUT BEFORE YOU GOT MONEY TO BUY MORE.: NEVER TRUE

## 2025-06-16 SDOH — ECONOMIC STABILITY: FOOD INSECURITY: WITHIN THE PAST 12 MONTHS, THE FOOD YOU BOUGHT JUST DIDN'T LAST AND YOU DIDN'T HAVE MONEY TO GET MORE.: NEVER TRUE

## 2025-06-16 ASSESSMENT — PATIENT HEALTH QUESTIONNAIRE - PHQ9
1. LITTLE INTEREST OR PLEASURE IN DOING THINGS: NOT AT ALL
SUM OF ALL RESPONSES TO PHQ QUESTIONS 1-9: 0
2. FEELING DOWN, DEPRESSED OR HOPELESS: NOT AT ALL

## 2025-06-16 ASSESSMENT — ENCOUNTER SYMPTOMS
DIARRHEA: 0
SHORTNESS OF BREATH: 0
SORE THROAT: 0
VOMITING: 0
COUGH: 0
NAUSEA: 0
SINUS PAIN: 0
ABDOMINAL PAIN: 0

## 2025-06-16 NOTE — PROGRESS NOTES
[facility]  [facility]  [address] [address]  Dept: [phone]  Dept Fax: [phone]    Suzi Bailey is a 64 y.o. female who presents today for her medicalconditions/complaints as noted below.  Suzi Bailey is c/o of Medication Problem      HPI:     64-year-old female patient presents with complaints of follow-up.     Type 2 diabetes last A1c 9.7.   Previously Managed with janumet 1000-100, not currently taking,  will resume, did not tolerate rybelsus. Concerns with jardiance  side effects, does not tolerate metformin over 1000. Positive urine micro on lisinopril 2.5     Hypothyroidism, Currently on NP thyroid 60, last TSH normal, hx of thyroid nodule last ultrasound normal. Adjusted to levothyroxine 75    Hyperlipidemia - declined med     Up to date on melisa, due for pap, Colonoscopy up to date          Past Medical History:   Diagnosis Date    Diabetes mellitus (HCC)     Hypothyroidism         Current Outpatient Medications   Medication Sig Dispense Refill    levothyroxine (SYNTHROID) 75 MCG tablet Take 1 tablet by mouth daily 90 tablet 1    lisinopril (ZESTRIL) 2.5 MG tablet Take 1 tablet by mouth daily 90 tablet 3    SITagliptin-metFORMIN HCl ER (JANUMET XR) 100-1000 MG TB24 Take 1 tablet by mouth daily 90 tablet 0    clotrimazole-betamethasone (LOTRISONE) 1-0.05 % cream Apply topically 2 times daily. 45 g 1    pantoprazole (PROTONIX) 40 MG tablet Take 1 tablet by mouth every morning (before breakfast) 30 tablet 2    Handicap Placard MISC by Does not apply route Duration: Lifetime 1 each 0    Barberry-Oreg Grape-Goldenseal (BERBERINE COMPLEX PO) Take by mouth      Cyanocobalamin (VITAMIN B 12 PO) Take by mouth      OIL OF OREGANO PO Take by mouth      Chromium Picolinate 200 MCG CAPS Take by mouth      Cholecalciferol (VITAMIN D-3) 25 MCG (1000 UT) CAPS Take by mouth      Cranberry 1000 MG CAPS Take by mouth       No current facility-administered

## 2025-06-16 NOTE — PATIENT INSTRUCTIONS
Hypothyroidism: Care Instructions  Your Care Instructions     When you have hypothyroidism, your body doesn't make enough thyroid hormone. This hormone helps your body use energy. If your thyroid level is low, you may feel tired, be constipated, have an increase in your blood pressure, or have dry skin or memory problems. You may also get cold easily, even when it is warm. Women with low thyroid levels may have heavy menstrual periods.  A blood test to find your thyroid-stimulating hormone (TSH) level is used to check for hypothyroidism. A high TSH level may mean that you have it.  The treatment for hypothyroidism is thyroid hormone pills. You should start to feel better in 1 to 2 weeks. Most people need treatment for the rest of their lives. You will need regular visits with your doctor to make sure you are doing well and that you have the right dose of medicine.  Follow-up care is a key part of your treatment and safety. Be sure to make and go to all appointments, and call your doctor if you are having problems. It's also a good idea to know your test results and keep a list of the medicines you take.  How can you care for yourself at home?  Take your thyroid hormone medicine exactly as prescribed. Call your doctor if you think you are having a problem with your medicine. Most people do not have side effects if they take the right amount of medicine regularly.  Take the medicine 30 minutes before breakfast, and do not take it with calcium, vitamins, or iron.  Do not take extra doses of your thyroid medicine. It will not help you get better any faster, and it may cause side effects.  If you forget to take a dose, do NOT take a double dose of medicine. Take your usual dose the next day.  Tell your doctor about all prescription, herbal, or over-the-counter products you take.  Take care of yourself. Eat a healthy diet, get enough sleep, and get regular exercise.  When should you call for help?   Call 911 anytime you

## (undated) DEVICE — GAUZE,SPONGE,4"X4",16PLY,STRL,LF,10/TRAY: Brand: MEDLINE

## (undated) DEVICE — BASIN EMSIS 700ML GRAPHITE PLAS KID SHP GRAD

## (undated) DEVICE — Device: Brand: DEFENDO VALVE AND CONNECTOR KIT

## (undated) DEVICE — MEDICINE CUP, GRADUATED, STER: Brand: MEDLINE

## (undated) DEVICE — GOWN,AURORA,NONREINFORCED,LARGE: Brand: MEDLINE

## (undated) DEVICE — TUBING, SUCTION, 1/4" X 12', STRAIGHT: Brand: MEDLINE

## (undated) DEVICE — JELLY,LUBE,STERILE,FLIP TOP,TUBE,2-OZ: Brand: MEDLINE

## (undated) DEVICE — FORCEPS BX L240CM JAW DIA2.8MM L CAP W/ NDL MIC MESH TOOTH

## (undated) DEVICE — BLOCK BITE 60FR RUBBER ADLT DENTAL

## (undated) DEVICE — CO2 CANNULA,SUPERSOFT, ADLT,7'O2,7'CO2: Brand: MEDLINE

## (undated) DEVICE — SYRINGE MED 50ML LUERLOCK TIP

## (undated) DEVICE — ADAPTER TBNG LUER STUB 15 GA INTMED

## (undated) DEVICE — YANKAUER,FLEXIBLE HANDLE,REGLR CAPACITY: Brand: MEDLINE INDUSTRIES, INC.